# Patient Record
Sex: FEMALE | Race: BLACK OR AFRICAN AMERICAN | Employment: FULL TIME | URBAN - METROPOLITAN AREA
[De-identification: names, ages, dates, MRNs, and addresses within clinical notes are randomized per-mention and may not be internally consistent; named-entity substitution may affect disease eponyms.]

---

## 2017-02-22 ENCOUNTER — OFFICE VISIT (OUTPATIENT)
Dept: INTERNAL MEDICINE CLINIC | Age: 62
End: 2017-02-22

## 2017-02-22 VITALS
OXYGEN SATURATION: 97 % | HEART RATE: 99 BPM | SYSTOLIC BLOOD PRESSURE: 133 MMHG | HEIGHT: 58 IN | DIASTOLIC BLOOD PRESSURE: 76 MMHG | BODY MASS INDEX: 18.3 KG/M2 | TEMPERATURE: 98.1 F | WEIGHT: 87.2 LBS | RESPIRATION RATE: 18 BRPM

## 2017-02-22 DIAGNOSIS — J06.9 VIRAL URI WITH COUGH: Primary | ICD-10-CM

## 2017-02-22 RX ORDER — BENZONATATE 200 MG/1
200 CAPSULE ORAL
Qty: 21 CAP | Refills: 0 | Status: SHIPPED | OUTPATIENT
Start: 2017-02-22 | End: 2017-03-01

## 2017-02-22 NOTE — PATIENT INSTRUCTIONS
1. Mucinex to help break up congestion, tessalon capsules for cough  2. Please get your mammogram and see when you are supposed to get repeat colonoscopy  3. Return if worsening symptoms      Viral Respiratory Infection: Care Instructions  Your Care Instructions  Viruses are very small organisms. They grow in number after they enter your body. There are many types that cause different illnesses, such as colds and the mumps. The symptoms of a viral respiratory infection often start quickly. They include a fever, sore throat, and runny nose. You may also just not feel well. Or you may not want to eat much. Most viral respiratory infections are not serious. They usually get better with time and self-care. Antibiotics are not used to treat a viral infection. That's because antibiotics will not help cure a viral illness. In some cases, antiviral medicine can help your body fight a serious viral infection. Follow-up care is a key part of your treatment and safety. Be sure to make and go to all appointments, and call your doctor if you are having problems. It's also a good idea to know your test results and keep a list of the medicines you take. How can you care for yourself at home? · Rest as much as possible until you feel better. · Be safe with medicines. Take your medicine exactly as prescribed. Call your doctor if you think you are having a problem with your medicine. You will get more details on the specific medicine your doctor prescribes. · Take an over-the-counter pain medicine, such as acetaminophen (Tylenol), ibuprofen (Advil, Motrin), or naproxen (Aleve), as needed for pain and fever. Read and follow all instructions on the label. Do not give aspirin to anyone younger than 20. It has been linked to Reye syndrome, a serious illness. · Drink plenty of fluids, enough so that your urine is light yellow or clear like water.  Hot fluids, such as tea or soup, may help relieve congestion in your nose and throat. If you have kidney, heart, or liver disease and have to limit fluids, talk with your doctor before you increase the amount of fluids you drink. · Try to clear mucus from your lungs by breathing deeply and coughing. · Gargle with warm salt water once an hour. This can help reduce swelling and throat pain. Use 1 teaspoon of salt mixed in 1 cup of warm water. · Do not smoke or allow others to smoke around you. If you need help quitting, talk to your doctor about stop-smoking programs and medicines. These can increase your chances of quitting for good. To avoid spreading the virus  · Cough or sneeze into a tissue. Then throw the tissue away. · If you don't have a tissue, use your hand to cover your cough or sneeze. Then clean your hand. You can also cough into your sleeve. · Wash your hands often. Use soap and warm water. Wash for 15 to 20 seconds each time. · If you don't have soap and water near you, you can clean your hands with alcohol wipes or gel. When should you call for help? Call your doctor now or seek immediate medical care if:  · You have a new or higher fever. · Your fever lasts more than 48 hours. · You have trouble breathing. · You have a fever with a stiff neck or a severe headache. · You are sensitive to light. · You feel very sleepy or confused. Watch closely for changes in your health, and be sure to contact your doctor if:  · You do not get better as expected. Where can you learn more? Go to http://guerrero-callie.info/. Enter M468 in the search box to learn more about \"Viral Respiratory Infection: Care Instructions. \"  Current as of: June 30, 2016  Content Version: 11.1  © 6592-9804 [employer]. Care instructions adapted under license by [employer] (which disclaims liability or warranty for this information).  If you have questions about a medical condition or this instruction, always ask your healthcare professional. [employer] disclaims any warranty or liability for your use of this information.

## 2017-02-22 NOTE — MR AVS SNAPSHOT
Visit Information Date & Time Provider Department Dept. Phone Encounter #  
 2/22/2017  8:15 AM Meliza Cardozo, [address] [id number] Follow-up Instructions Return in about 6 months (around 8/22/2017), or if symptoms worsen or fail to improve. Upcoming Health Maintenance Date Due ZOSTER VACCINE AGE 60> 12/6/2016 BREAST CANCER SCRN MAMMOGRAM 1/30/2017 FOBT Q 1 YEAR AGE 50-75 3/10/2017 Pneumococcal 19-64 Highest Risk (2 of 3 - PCV13) 11/30/2017 DTaP/Tdap/Td series (2 - Td) 8/22/2026 Allergies as of 2/22/2017  Review Complete On: 2/22/2017 By: Nicolasa Delgado LPN Severity Noted Reaction Type Reactions Lisinopril Medium 07/05/2011    Cough Other reaction(s): Adverse reaction to substance Current Immunizations  Never Reviewed Name Date Influenza Vaccine 11/10/2015 Pneumococcal Polysaccharide (PPSV-23) 11/30/2016 11:17 AM  
 Td, Adsorbed PF 3/19/2015 Tdap 8/22/2016  9:08 AM  
  
 Not reviewed this visit You Were Diagnosed With   
  
 Codes Comments Viral URI with cough    -  Primary ICD-10-CM: J06.9, B97.89 ICD-9-CM: 465.9 Vitals BP  
  
  
  
  
  
 133/76 (BP 1 Location: Left arm, BP Patient Position: Sitting) Vitals History BMI and BSA Data Body Mass Index Body Surface Area  
 18.22 kg/m 2 1.27 m 2 Preferred Pharmacy Pharmacy Name Phone [id number] [address] [phone] Your Updated Medication List  
  
   
This list is accurate as of: 2/22/17  9:04 AM.  Always use your most recent med list. amLODIPine 10 mg tablet Commonly known as:  Haines Blade Take 1 Tab by mouth daily. atorvastatin 10 mg tablet Commonly known as:  LIPITOR Take 1 Tab by mouth nightly. benzonatate 200 mg capsule Commonly known as:  TESSALON  
 Take 1 Cap by mouth three (3) times daily as needed for Cough for up to 7 days. guaiFENesin  mg SR tablet Commonly known as:  Brandon & Brandon Take 1 Tab by mouth two (2) times a day. hydroCHLOROthiazide 25 mg tablet Commonly known as:  HYDRODIURIL Take 1 Tab by mouth daily. Prescriptions Sent to Pharmacy Refills  
 benzonatate (TESSALON) 200 mg capsule 0 Sig: Take 1 Cap by mouth three (3) times daily as needed for Cough for up to 7 days. Class: Normal  
 Pharmacy: [id number]- [address]  #: [phone] Route: Oral  
 guaiFENesin SR (MUCINEX) 600 mg SR tablet 0 Sig: Take 1 Tab by mouth two (2) times a day. Class: Normal  
 Pharmacy: [id number]- [address] Ph #: [phone] Route: Oral  
  
Follow-up Instructions Return in about 6 months (around 8/22/2017), or if symptoms worsen or fail to improve. To-Do List   
 03/20/2017 7:00 AM  
  Appointment with [facility] CT 2 at [facility] ([phone]) CONTRAST STUDY: 1. The patient should not eat solid food four hours before the appointment but should be encouraged to drink clear liquids. 2.  If you have to drink oral contrast, please pick it up any weekday prior to your appointment, if you cannot please check in 2 hrs before appt time. 3.  The patient will require IV access for contrast administration. 4.  The patient should not take Ibuprofen (Advil, Motrin, etc.) and Naproxen Sodium (Aleve, etc.)  on the day of the exam. Stopping non-steroidal anti-inflammatory agents (NSAIDs) like Ibuprofen decreases the risk of kidney damage from the x-ray contrast (dye). 5.  Bring any non [facility] facility films/images pertaining to the area of interest with you on the day of appointment.  6.  Bring current lab work if available (within last 90 days CMP) ***If scheduled at [facility], iSTAT is not available, labs will need to be done before appointment*** 7. Check in at registration at least 30 minutes before appt time unless you were instructed to do otherwise. Patient Instructions 1. Mucinex to help break up congestion, tessalon capsules for cough 2. Please get your mammogram and see when you are supposed to get repeat colonoscopy 3. Return if worsening symptoms Viral Respiratory Infection: Care Instructions Your Care Instructions Viruses are very small organisms. They grow in number after they enter your body. There are many types that cause different illnesses, such as colds and the mumps. The symptoms of a viral respiratory infection often start quickly. They include a fever, sore throat, and runny nose. You may also just not feel well. Or you may not want to eat much. Most viral respiratory infections are not serious. They usually get better with time and self-care. Antibiotics are not used to treat a viral infection. That's because antibiotics will not help cure a viral illness. In some cases, antiviral medicine can help your body fight a serious viral infection. Follow-up care is a key part of your treatment and safety. Be sure to make and go to all appointments, and call your doctor if you are having problems. It's also a good idea to know your test results and keep a list of the medicines you take. How can you care for yourself at home? · Rest as much as possible until you feel better. · Be safe with medicines. Take your medicine exactly as prescribed. Call your doctor if you think you are having a problem with your medicine. You will get more details on the specific medicine your doctor prescribes.  
· Take an over-the-counter pain medicine, such as acetaminophen (Tylenol), ibuprofen (Advil, Motrin), or naproxen (Aleve), as needed for pain and fever. Read and follow all instructions on the label. Do not give aspirin to anyone younger than 20. It has been linked to Reye syndrome, a serious illness. · Drink plenty of fluids, enough so that your urine is light yellow or clear like water. Hot fluids, such as tea or soup, may help relieve congestion in your nose and throat. If you have kidney, heart, or liver disease and have to limit fluids, talk with your doctor before you increase the amount of fluids you drink. · Try to clear mucus from your lungs by breathing deeply and coughing. · Gargle with warm salt water once an hour. This can help reduce swelling and throat pain. Use 1 teaspoon of salt mixed in 1 cup of warm water. · Do not smoke or allow others to smoke around you. If you need help quitting, talk to your doctor about stop-smoking programs and medicines. These can increase your chances of quitting for good. To avoid spreading the virus · Cough or sneeze into a tissue. Then throw the tissue away. · If you don't have a tissue, use your hand to cover your cough or sneeze. Then clean your hand. You can also cough into your sleeve. · Wash your hands often. Use soap and warm water. Wash for 15 to 20 seconds each time. · If you don't have soap and water near you, you can clean your hands with alcohol wipes or gel. When should you call for help? Call your doctor now or seek immediate medical care if: 
· You have a new or higher fever. · Your fever lasts more than 48 hours. · You have trouble breathing. · You have a fever with a stiff neck or a severe headache. · You are sensitive to light. · You feel very sleepy or confused. Watch closely for changes in your health, and be sure to contact your doctor if: 
· You do not get better as expected. Where can you learn more? Go to http://guerrero-callie.info/. Enter J058 in the search box to learn more about \"Viral Respiratory Infection: Care Instructions. \" 
 Current as of: June 30, 2016 Content Version: 11.1 © 9207-5796 [employer]. Care instructions adapted under license by [employer] (which disclaims liability or warranty for this information). If you have questions about a medical condition or this instruction, always ask your healthcare professional. [address] any warranty or liability for your use of this information. Introducing [facility]! Dear Shaye Thomas: Thank you for requesting a [employer] account. Our records indicate that you already have an active [employer] account. You can access your account anytime at [email or web address]. [employer]ack/[employer] Did you know that you can access your hospital and ER discharge instructions at any time in [employer]? You can also review all of your test results from your hospital stay or ER visit. Additional Information If you have questions, please visit the Frequently Asked Questions section of the [employer] website at [email or web address] Remember, [employer] is NOT to be used for urgent needs. For medical emergencies, dial 911. Now available from your iPhone and Android! Please provide this summary of care documentation to your next provider. Your primary care clinician is listed as Angelika Eason. If you have any questions after today's visit, please call [phone].

## 2017-02-22 NOTE — PROGRESS NOTES
Subjective: (As above and below)     Chief Complaint   Patient presents with   [facility] Hypertension    Follow-up   [facility] BRAIN Ram is a 62y.o. year old female who presents for follow up and following concerns:    Hypertension ROS:  taking medications as instructed, no medication side effects noted, no TIAs, no chest pain on exertion, no dyspnea on exertion, no swelling of ankles    Cold symptoms: cough with yellow phlegm, mild sob, no wheezing. Mild tightness in ribs with coughing. She denies fevers. She has been taking nyquil and dayquil which are not helping. 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 She did get a flu shot. No sore throat. No ear symptoms. She is followed by nephro for hx of renal cell carcinoma and has upcoming routine CT scan. She is planning on rescheduling her mammogram. She is asked to contact GI to see when they wanted to f/u on colonoscopy with hx of polyps. Reviewed PmHx, RxHx, FmHx, SocHx, AllgHx and updated in chart.   Family History   Problem Relation Age of Onset    Diabetes Mother     Alcohol abuse Father     Cancer Sister      breast    Cancer Brother      breast    No Known Problems Sister     No Known Problems Brother        Past Medical History:   Diagnosis Date    Chronic leg pain 11/3/2010    fibromyalgia    COPD (chronic obstructive pulmonary disease) ([facility] [address] 9/18/2009    GERD (gastroesophageal reflux disease)     History of kidney cancer     Hypertension 9/18/2009    Leukopenia 9/18/2009    Other ill-defined conditions(799.89)     fibromyalgia    Vitamin D deficiency 7/7/2010      Social History     Social History    Marital status: SINGLE     Spouse name: N/A    Number of children: N/A    Years of education: N/A     Social History Main Topics    Smoking status: Current Every Day Smoker     Packs/day: 0.50    Smokeless tobacco: Never Used    Alcohol use Yes      Comment: occasionally    Drug use: No    Sexual activity: Yes     Partners: Male     Birth control/ protection: Surgical     Other Topics Concern    None     Social History Narrative          Current Outpatient Prescriptions   Medication Sig    benzonatate (TESSALON) 200 mg capsule Take 1 Cap by mouth three (3) times daily as needed for Cough for up to 7 days.  guaiFENesin SR (MUCINEX) 600 mg SR tablet Take 1 Tab by mouth two (2) times a day.  atorvastatin (LIPITOR) 10 mg tablet Take 1 Tab by mouth nightly.  hydrochlorothiazide (HYDRODIURIL) 25 mg tablet Take 1 Tab by mouth daily.  amLODIPine (NORVASC) 10 mg tablet Take 1 Tab by mouth daily. No current facility-administered medications for this visit. Review of Systems:   Constitutional:    Negative for fever and chills, negative diaphoresis. HEENT:              Negative for neck pain and stiffness. Eyes:                  Negative for visual disturbance, itching, redness or discharge. Respiratory:        + for cough and shortness of breath. Cardiovascular:  Negative for chest pain and palpitations. Gastrointestinal: Negative for nausea, vomiting, abdominal pain, diarrhea or constipation. Genitourinary:     Negative for dysuria and frequency. Musculoskeletal: Negative for falls, tenderness and swelling. Skin:                    Negative for rash, masses or lesions. Neurological:       Negative for dizzyness, seizure, loss of consciousness, weakness and numbness.      Objective:     Vitals:    02/22/17 0843   BP: 133/76   Pulse: 99   Resp: 18   Temp: 98.1 °F (36.7 °C)   TempSrc: Oral   SpO2: 97%   Weight: 87 lb 3.2 oz (39.6 kg)   Height: 4' 10\" (1.473 m)       Results for orders placed or performed in visit on 11/30/16   TSH 3RD GENERATION   Result Value Ref Range    TSH 0.813 0.450 - 4.500 uIU/mL   VITAMIN B12 & FOLATE   Result Value Ref Range    Vitamin B12 627 211 - 946 pg/mL    Folate 17.4 >3.0 ng/mL   AMB POC URINALYSIS DIP STICK AUTO W/O MICRO   Result Value Ref Range    Color (UA POC) Colorless     Clarity (UA POC) Clear     Glucose (UA POC) Negative Negative    Bilirubin (UA POC) Negative Negative    Ketones (UA POC) Negative Negative    Specific gravity (UA POC) 1.010 1.001 - 1.035    Blood (UA POC) Trace Negative    pH (UA POC) 6.5 4.6 - 8.0    Protein (UA POC) Negative Negative mg/dL    Urobilinogen (UA POC) 0.2 mg/dL 0.2 - 1    Nitrites (UA POC) Negative Negative    Leukocyte esterase (UA POC) Negative Negative         Physical Examination: General appearance - alert, well appearing, and in no distress  Eyes - pupils equal and reactive, extraocular eye movements intact  Ears - bilateral TM's and external ear canals normal  Mouth - mucous membranes moist, pharynx normal without lesions. +clear PND noted  Neck - supple, no significant adenopathy  Chest - clear to auscultation, no wheezes, rales or rhonchi, symmetric air entry  Heart - normal rate, regular rhythm, normal S1, S2, no murmurs, rubs, clicks or gallops  Extremities - peripheral pulses normal, no pedal edema, no clubbing or cyanosis      Assessment/ Plan:   Follow-up Disposition:  Return in about 6 months (around 8/22/2017), or if symptoms worsen or fail to improve. 1. Viral URI with cough    - benzonatate (TESSALON) 200 mg capsule; Take 1 Cap by mouth three (3) times daily as needed for Cough for up to 7 days. Dispense: 21 Cap; Refill: 0  - guaiFENesin SR (MUCINEX) 600 mg SR tablet; Take 1 Tab by mouth two (2) times a day. Dispense: 30 Tab; Refill: 0        I have discussed the diagnosis with the patient and the intended plan as seen in the above orders. The patient has received an after-visit summary and questions were answered concerning future plans. Pt conveyed understanding of plan. Medication Side Effects and Warnings were discussed with patient: yes  Patient Labs were reviewed: yes  Patient Past Records were reviewed:  yes    Mary Amos.  Jorge A Appiah NP

## 2017-02-22 NOTE — PROGRESS NOTES
1. Have you been to the ER, urgent care clinic since your last visit? Hospitalized since your last visit? No.    2. Have you seen or consulted any other health care providers outside of the [address] [place] [address] since your last visit? Include any pap smears or colon screening.  No.

## 2017-03-20 ENCOUNTER — HOSPITAL ENCOUNTER (OUTPATIENT)
Dept: GENERAL RADIOLOGY | Age: 62
Discharge: HOME | End: 2017-03-20
Attending: UROLOGY
Payer: COMMERCIAL

## 2017-03-20 ENCOUNTER — HOSPITAL ENCOUNTER (OUTPATIENT)
Dept: CT IMAGING | Age: 62
Discharge: HOME | End: 2017-03-20
Attending: UROLOGY
Payer: COMMERCIAL

## 2017-03-20 DIAGNOSIS — C64.9 RENAL CELL CARCINOMA (HCC): ICD-10-CM

## 2017-03-20 DIAGNOSIS — C64.9 CARCINOMA, RENAL CELL (HCC): ICD-10-CM

## 2017-03-20 LAB — CREAT BLD-MCNC: 0.7 MG/DL (ref 0.6–1.3)

## 2017-03-20 PROCEDURE — 82565 ASSAY OF CREATININE: CPT

## 2017-03-20 PROCEDURE — 71020 XR CHEST PA LAT: CPT

## 2017-03-20 PROCEDURE — 74160 CT ABDOMEN W/CONTRAST: CPT

## 2017-03-20 PROCEDURE — 74011636320 HC RX REV CODE- 636/320: Performed by: UROLOGY

## 2017-03-20 PROCEDURE — 74011250636 HC RX REV CODE- 250/636: Performed by: UROLOGY

## 2017-03-20 RX ORDER — SODIUM CHLORIDE 9 MG/ML
50 INJECTION, SOLUTION INTRAVENOUS
Status: COMPLETED | OUTPATIENT
Start: 2017-03-20 | End: 2017-03-20

## 2017-03-20 RX ORDER — SODIUM CHLORIDE 0.9 % (FLUSH) 0.9 %
10 SYRINGE (ML) INJECTION
Status: COMPLETED | OUTPATIENT
Start: 2017-03-20 | End: 2017-03-20

## 2017-03-20 RX ADMIN — IOPAMIDOL 100 ML: 612 INJECTION, SOLUTION INTRAVENOUS at 07:06

## 2017-03-20 RX ADMIN — Medication 10 ML: at 07:06

## 2017-03-20 RX ADMIN — SODIUM CHLORIDE 50 ML/HR: 900 INJECTION, SOLUTION INTRAVENOUS at 07:06

## 2017-04-25 ENCOUNTER — OFFICE VISIT (OUTPATIENT)
Dept: PRIMARY CARE CLINIC | Age: 62
End: 2017-04-25

## 2017-04-25 VITALS
RESPIRATION RATE: 16 BRPM | TEMPERATURE: 98 F | WEIGHT: 91 LBS | BODY MASS INDEX: 19.1 KG/M2 | HEIGHT: 58 IN | HEART RATE: 77 BPM | SYSTOLIC BLOOD PRESSURE: 129 MMHG | OXYGEN SATURATION: 97 % | DIASTOLIC BLOOD PRESSURE: 64 MMHG

## 2017-04-25 DIAGNOSIS — H61.23 BILATERAL IMPACTED CERUMEN: Primary | ICD-10-CM

## 2017-04-25 NOTE — PROGRESS NOTES
Pt c/o bilateral ear pain. Pt states that L ear has wax discharge and R ear feels like \"there's something in there\". Pt states sx's x1wk. Pt states R ear has throbbing discomfort. Pt states having h/o wax build up.

## 2017-04-25 NOTE — PATIENT INSTRUCTIONS
Earwax Blockage: Care Instructions  Your Care Instructions    Earwax is a natural substance that protects the ear canal. Normally, earwax drains from the ears and does not cause problems. Sometimes earwax builds up and hardens. Earwax blockage (also called cerumen impaction) can cause some loss of hearing and pain. When wax is tightly packed, you will need to have your doctor remove it. Follow-up care is a key part of your treatment and safety. Be sure to make and go to all appointments, and call your doctor if you are having problems. Its also a good idea to know your test results and keep a list of the medicines you take. How can you care for yourself at home? · Do not try to remove earwax with cotton swabs, fingers, or other objects. This can make the blockage worse and damage the eardrum. · If your doctor recommends that you try to remove earwax at home:  ¨ Soften and loosen the earwax with warm mineral oil. You also can try hydrogen peroxide mixed with an equal amount of room temperature water. Place 2 drops of the fluid, warmed to body temperature, in the ear two times a day for up to 5 days. ¨ Once the wax is loose and soft, all that is usually needed to remove it from the ear canal is a gentle, warm shower. Direct the water into the ear, then tip your head to let the earwax drain out. Dry your ear thoroughly with a hair dryer set on low. Hold the dryer several inches from your ear. ¨ If the warm mineral oil and shower do not work, use an over-the-counter wax softener followed by gentle flushing with an ear syringe each night for a week or two. Make sure the flushing solution is body temperature. Cool or hot fluids in the ear can cause dizziness. When should you call for help? Call your doctor now or seek immediate medical care if:  · Pus or blood drains from your ear. · Your ears are ringing or feel full. · You have a loss of hearing.   Watch closely for changes in your health, and be sure to contact your doctor if:  · You have pain or reduced hearing after 1 week of home treatment. · You have any new symptoms, such as nausea or balance problems. Where can you learn more? Go to http://guerrero-callie.info/. Enter Q340 in the search box to learn more about \"Earwax Blockage: Care Instructions. \"  Current as of: May 27, 2016  Content Version: 11.2  © 0268-6574 [employer]. Care instructions adapted under license by [employer] (which disclaims liability or warranty for this information). If you have questions about a medical condition or this instruction, always ask your healthcare professional. [address] any warranty or liability for your use of this information.

## 2017-04-25 NOTE — PROGRESS NOTES
Subjective:      Vandana Varela is a 62 y.o. female who presents for possible ear infection. She has noticed bilateral ear discomfort for a week. She has tried earwax drops which helped slightly on the left side but still feels pressure. No fever or chills. Has hx of wax build up in her ears. No recent URI symptoms. Past Medical History:   Diagnosis Date    Chronic leg pain 11/3/2010    fibromyalgia    COPD (chronic obstructive pulmonary disease) ([address] 9/18/2009    GERD (gastroesophageal reflux disease)     History of kidney cancer     Hypertension 9/18/2009    Leukopenia 9/18/2009    Other ill-defined conditions     fibromyalgia    Vitamin D deficiency 7/7/2010     Current Outpatient Prescriptions   Medication Sig Dispense Refill    atorvastatin (LIPITOR) 10 mg tablet Take 1 Tab by mouth nightly. 30 Tab 3    hydrochlorothiazide (HYDRODIURIL) 25 mg tablet Take 1 Tab by mouth daily. 90 Tab 3    amLODIPine (NORVASC) 10 mg tablet Take 1 Tab by mouth daily. 90 Tab 3    guaiFENesin SR (MUCINEX) 600 mg SR tablet Take 1 Tab by mouth two (2) times a day. 30 Tab 0     Allergies   Allergen Reactions    Lisinopril Cough     Other reaction(s): Adverse reaction to substance        Review of Systems  Pertinent items are noted in HPI. Objective:     Visit Vitals    /64 (BP 1 Location: Right arm, BP Patient Position: Sitting)    Pulse 77    Temp 98 °F (36.7 °C) (Oral)    Resp 16    Ht 4' 10\" (1.473 m)    Wt 91 lb (41.3 kg)    SpO2 97%    BMI 19.02 kg/m2     GEN: No apparent distress. Alert and oriented and responds to all questions appropriately. EYES:  Conjunctiva clear; pupils round and reactive to light; extraocular movements are intact. EAR: External ears are normal. Bilateral cerumen impaction, irrigated as below, normal TMs visualized   NOSE: Turbinates are within normal limits. No drainage  OROPHYARYNX: No oral lesions or exudates.   NECK:  Supple; no masses; thyroid normal LUNGS: Respirations unlabored; clear to auscultation bilaterally  CARDIOVASCULAR: Regular, rate, and rhythm without murmurs, gallops or rubs   EXT: Well perfused. No edema. Moving all extremities equally. SKIN: No obvious rashes. Assessment:       ICD-10-CM ICD-9-CM    1. Bilateral impacted cerumen H61.23 380.4 NE REMOVAL IMPACTED CERUMEN IRRIGATION/LVG UNILAT     Irrigation done with water and hydrogen peroxide, all cerumen was removed, patient reported immediate relief. No signs of AOM. Return PRN.

## 2017-04-25 NOTE — MR AVS SNAPSHOT
Visit Information Date & Time Provider Department Dept. Phone Encounter #  
 4/25/2017  5:30  Alejandraanne , [address] [facility] [phone] [id number] Upcoming Health Maintenance Date Due ZOSTER VACCINE AGE 60> 12/6/2016 BREAST CANCER SCRN MAMMOGRAM 1/30/2017 Pneumococcal 19-64 Highest Risk (2 of 3 - PCV13) 11/30/2017 COLONOSCOPY 10/5/2021 DTaP/Tdap/Td series (2 - Td) 8/22/2026 Allergies as of 4/25/2017  Review Complete On: 4/25/2017 By: Kayla Jackson MD  
  
 Severity Noted Reaction Type Reactions Lisinopril Medium 07/05/2011    Cough Other reaction(s): Adverse reaction to substance Current Immunizations  Never Reviewed Name Date Influenza Vaccine 11/10/2015 Pneumococcal Polysaccharide (PPSV-23) 11/30/2016 11:17 AM  
 Td, Adsorbed PF 3/19/2015 Tdap 8/22/2016  9:08 AM  
  
 Not reviewed this visit You Were Diagnosed With   
  
 Codes Comments Bilateral impacted cerumen    -  Primary ICD-10-CM: Q72.42 
ICD-9-CM: 380.4 Vitals BP Pulse Temp Resp Height(growth percentile) Weight(growth percentile) 129/64 (BP 1 Location: Right arm, BP Patient Position: Sitting) 77 98 °F (36.7 °C) (Oral) 16 4' 10\" (1.473 m) 91 lb (41.3 kg) SpO2 BMI OB Status Smoking Status 97% 19.02 kg/m2 Hysterectomy Current Every Day Smoker Vitals History BMI and BSA Data Body Mass Index Body Surface Area 19.02 kg/m 2 1.3 m 2 Preferred Pharmacy Pharmacy Name Phone [id number] [address] [phone] Your Updated Medication List  
  
   
This list is accurate as of: 4/25/17  5:50 PM.  Always use your most recent med list. amLODIPine 10 mg tablet Commonly known as:  Chantell Jame Take 1 Tab by mouth daily. atorvastatin 10 mg tablet Commonly known as:  LIPITOR Take 1 Tab by mouth nightly. guaiFENesin  mg SR tablet Commonly known as:  Brandon & Brandon Take 1 Tab by mouth two (2) times a day. hydroCHLOROthiazide 25 mg tablet Commonly known as:  HYDRODIURIL Take 1 Tab by mouth daily. We Performed the Following AR REMOVAL IMPACTED CERUMEN IRRIGATION/LVG MANAV [37710 CPT(R)] Patient Instructions Earwax Blockage: Care Instructions Your Care Instructions Earwax is a natural substance that protects the ear canal. Normally, earwax drains from the ears and does not cause problems. Sometimes earwax builds up and hardens. Earwax blockage (also called cerumen impaction) can cause some loss of hearing and pain. When wax is tightly packed, you will need to have your doctor remove it. Follow-up care is a key part of your treatment and safety. Be sure to make and go to all appointments, and call your doctor if you are having problems. Its also a good idea to know your test results and keep a list of the medicines you take. How can you care for yourself at home? · Do not try to remove earwax with cotton swabs, fingers, or other objects. This can make the blockage worse and damage the eardrum. · If your doctor recommends that you try to remove earwax at home: ¨ Soften and loosen the earwax with warm mineral oil. You also can try hydrogen peroxide mixed with an equal amount of room temperature water. Place 2 drops of the fluid, warmed to body temperature, in the ear two times a day for up to 5 days. ¨ Once the wax is loose and soft, all that is usually needed to remove it from the ear canal is a gentle, warm shower. Direct the water into the ear, then tip your head to let the earwax drain out. Dry your ear thoroughly with a hair dryer set on low. Hold the dryer several inches from your ear.  
¨ If the warm mineral oil and shower do not work, use an over-the-counter wax softener followed by gentle flushing with an ear syringe each night for a week or two. Make sure the flushing solution is body temperature. Cool or hot fluids in the ear can cause dizziness. When should you call for help? Call your doctor now or seek immediate medical care if: · Pus or blood drains from your ear. · Your ears are ringing or feel full. · You have a loss of hearing. Watch closely for changes in your health, and be sure to contact your doctor if: 
· You have pain or reduced hearing after 1 week of home treatment. · You have any new symptoms, such as nausea or balance problems. Where can you learn more? Go to http://guerrero-callie.info/. Enter U862 in the search box to learn more about \"Earwax Blockage: Care Instructions. \" Current as of: May 27, 2016 Content Version: 11.2 © 5369-5738 [employer]. Care instructions adapted under license by [employer] (which disclaims liability or warranty for this information). If you have questions about a medical condition or this instruction, always ask your healthcare professional. [place] [address] any warranty or liability for your use of this information. Introducing [facility]! Dear Gilma Gallagher: Thank you for requesting a [employer] account. Our records indicate that you already have an active [employer] account. You can access your account anytime at [email or web address] Did you know that you can access your hospital and ER discharge instructions at any time in [employer]? You can also review all of your test results from your hospital stay or ER visit. Additional Information If you have questions, please visit the Frequently Asked Questions section of the [employer] website at [email or web address] Remember, [employer] is NOT to be used for urgent needs. For medical emergencies, dial 911. Now available from your iPhone and Android! Please provide this summary of care documentation to your next provider. Your primary care clinician is listed as Pedro Eason. If you have any questions after today's visit, please call [phone].

## 2017-04-26 ENCOUNTER — TELEPHONE (OUTPATIENT)
Dept: INTERNAL MEDICINE CLINIC | Age: 62
End: 2017-04-26

## 2017-04-27 DIAGNOSIS — Z12.39 SCREENING FOR BREAST CANCER: Primary | ICD-10-CM

## 2017-07-17 ENCOUNTER — ANESTHESIA (OUTPATIENT)
Dept: ENDOSCOPY | Age: 62
End: 2017-07-17
Payer: COMMERCIAL

## 2017-07-17 ENCOUNTER — ANESTHESIA EVENT (OUTPATIENT)
Dept: ENDOSCOPY | Age: 62
End: 2017-07-17
Payer: COMMERCIAL

## 2017-07-17 ENCOUNTER — HOSPITAL ENCOUNTER (OUTPATIENT)
Age: 62
Setting detail: OUTPATIENT SURGERY
Discharge: HOME | End: 2017-07-17
Attending: INTERNAL MEDICINE | Admitting: INTERNAL MEDICINE
Payer: COMMERCIAL

## 2017-07-17 VITALS
DIASTOLIC BLOOD PRESSURE: 81 MMHG | HEART RATE: 86 BPM | WEIGHT: 84.31 LBS | HEIGHT: 59 IN | OXYGEN SATURATION: 100 % | SYSTOLIC BLOOD PRESSURE: 151 MMHG | TEMPERATURE: 97.6 F | RESPIRATION RATE: 20 BRPM | BODY MASS INDEX: 17 KG/M2

## 2017-07-17 LAB
H PYLORI FROM TISSUE: NEGATIVE
KIT LOT NO., HCLOLOT: NORMAL
NEGATIVE CONTROL: NEGATIVE
POSITIVE CONTROL: POSITIVE

## 2017-07-17 PROCEDURE — 74011250636 HC RX REV CODE- 250/636: Performed by: INTERNAL MEDICINE

## 2017-07-17 PROCEDURE — 88305 TISSUE EXAM BY PATHOLOGIST: CPT | Performed by: INTERNAL MEDICINE

## 2017-07-17 PROCEDURE — 87077 CULTURE AEROBIC IDENTIFY: CPT | Performed by: INTERNAL MEDICINE

## 2017-07-17 PROCEDURE — 74011250636 HC RX REV CODE- 250/636

## 2017-07-17 PROCEDURE — 88112 CYTOPATH CELL ENHANCE TECH: CPT | Performed by: INTERNAL MEDICINE

## 2017-07-17 PROCEDURE — 88312 SPECIAL STAINS GROUP 1: CPT | Performed by: INTERNAL MEDICINE

## 2017-07-17 PROCEDURE — 76040000019: Performed by: INTERNAL MEDICINE

## 2017-07-17 PROCEDURE — 77030019988 HC FCPS ENDOSC DISP BSC -B: Performed by: INTERNAL MEDICINE

## 2017-07-17 PROCEDURE — 76060000031 HC ANESTHESIA FIRST 0.5 HR: Performed by: INTERNAL MEDICINE

## 2017-07-17 PROCEDURE — 74011000250 HC RX REV CODE- 250

## 2017-07-17 RX ORDER — PROPOFOL 10 MG/ML
INJECTION, EMULSION INTRAVENOUS AS NEEDED
Status: DISCONTINUED | OUTPATIENT
Start: 2017-07-17 | End: 2017-07-17 | Stop reason: HOSPADM

## 2017-07-17 RX ORDER — DEXTROMETHORPHAN/PSEUDOEPHED 2.5-7.5/.8
1.2 DROPS ORAL
Status: DISCONTINUED | OUTPATIENT
Start: 2017-07-17 | End: 2017-07-17 | Stop reason: HOSPADM

## 2017-07-17 RX ORDER — DIPHENHYDRAMINE HYDROCHLORIDE 50 MG/ML
50 INJECTION, SOLUTION INTRAMUSCULAR; INTRAVENOUS ONCE
Status: DISCONTINUED | OUTPATIENT
Start: 2017-07-17 | End: 2017-07-17 | Stop reason: HOSPADM

## 2017-07-17 RX ORDER — LORAZEPAM 2 MG/ML
2 INJECTION INTRAMUSCULAR AS NEEDED
Status: DISCONTINUED | OUTPATIENT
Start: 2017-07-17 | End: 2017-07-17 | Stop reason: HOSPADM

## 2017-07-17 RX ORDER — GLYCOPYRROLATE 0.2 MG/ML
INJECTION INTRAMUSCULAR; INTRAVENOUS AS NEEDED
Status: DISCONTINUED | OUTPATIENT
Start: 2017-07-17 | End: 2017-07-17 | Stop reason: HOSPADM

## 2017-07-17 RX ORDER — MIDAZOLAM HYDROCHLORIDE 1 MG/ML
1-2 INJECTION, SOLUTION INTRAMUSCULAR; INTRAVENOUS
Status: DISCONTINUED | OUTPATIENT
Start: 2017-07-17 | End: 2017-07-17 | Stop reason: HOSPADM

## 2017-07-17 RX ORDER — EPINEPHRINE 0.1 MG/ML
1 INJECTION INTRACARDIAC; INTRAVENOUS
Status: DISCONTINUED | OUTPATIENT
Start: 2017-07-17 | End: 2017-07-17 | Stop reason: HOSPADM

## 2017-07-17 RX ORDER — SODIUM CHLORIDE 0.9 % (FLUSH) 0.9 %
5-10 SYRINGE (ML) INJECTION EVERY 8 HOURS
Status: DISCONTINUED | OUTPATIENT
Start: 2017-07-17 | End: 2017-07-17 | Stop reason: HOSPADM

## 2017-07-17 RX ORDER — MIDAZOLAM HYDROCHLORIDE 1 MG/ML
5 INJECTION, SOLUTION INTRAMUSCULAR; INTRAVENOUS
Status: DISCONTINUED | OUTPATIENT
Start: 2017-07-17 | End: 2017-07-17 | Stop reason: HOSPADM

## 2017-07-17 RX ORDER — DEXTROSE MONOHYDRATE AND SODIUM CHLORIDE 5; .9 G/100ML; G/100ML
100 INJECTION, SOLUTION INTRAVENOUS CONTINUOUS
Status: DISCONTINUED | OUTPATIENT
Start: 2017-07-17 | End: 2017-07-17 | Stop reason: HOSPADM

## 2017-07-17 RX ORDER — SODIUM CHLORIDE 9 MG/ML
100 INJECTION, SOLUTION INTRAVENOUS CONTINUOUS
Status: DISCONTINUED | OUTPATIENT
Start: 2017-07-17 | End: 2017-07-17 | Stop reason: HOSPADM

## 2017-07-17 RX ORDER — LIDOCAINE HYDROCHLORIDE 20 MG/ML
5 SOLUTION OROPHARYNGEAL AS NEEDED
Status: DISCONTINUED | OUTPATIENT
Start: 2017-07-17 | End: 2017-07-17 | Stop reason: HOSPADM

## 2017-07-17 RX ORDER — ATROPINE SULFATE 0.1 MG/ML
0.5 INJECTION INTRAVENOUS
Status: DISCONTINUED | OUTPATIENT
Start: 2017-07-17 | End: 2017-07-17 | Stop reason: HOSPADM

## 2017-07-17 RX ORDER — FENTANYL CITRATE 50 UG/ML
100 INJECTION, SOLUTION INTRAMUSCULAR; INTRAVENOUS ONCE
Status: DISCONTINUED | OUTPATIENT
Start: 2017-07-17 | End: 2017-07-17 | Stop reason: HOSPADM

## 2017-07-17 RX ORDER — NALOXONE HYDROCHLORIDE 0.4 MG/ML
0.4 INJECTION, SOLUTION INTRAMUSCULAR; INTRAVENOUS; SUBCUTANEOUS
Status: DISCONTINUED | OUTPATIENT
Start: 2017-07-17 | End: 2017-07-17 | Stop reason: HOSPADM

## 2017-07-17 RX ORDER — SODIUM CHLORIDE 0.9 % (FLUSH) 0.9 %
5-10 SYRINGE (ML) INJECTION AS NEEDED
Status: DISCONTINUED | OUTPATIENT
Start: 2017-07-17 | End: 2017-07-17 | Stop reason: HOSPADM

## 2017-07-17 RX ORDER — FLUMAZENIL 0.1 MG/ML
0.2 INJECTION INTRAVENOUS
Status: DISCONTINUED | OUTPATIENT
Start: 2017-07-17 | End: 2017-07-17 | Stop reason: HOSPADM

## 2017-07-17 RX ORDER — LIDOCAINE HYDROCHLORIDE 20 MG/ML
INJECTION, SOLUTION EPIDURAL; INFILTRATION; INTRACAUDAL; PERINEURAL AS NEEDED
Status: DISCONTINUED | OUTPATIENT
Start: 2017-07-17 | End: 2017-07-17 | Stop reason: HOSPADM

## 2017-07-17 RX ADMIN — SODIUM CHLORIDE 100 ML/HR: 900 INJECTION, SOLUTION INTRAVENOUS at 08:32

## 2017-07-17 RX ADMIN — GLYCOPYRROLATE 0.2 MG: 0.2 INJECTION INTRAMUSCULAR; INTRAVENOUS at 09:18

## 2017-07-17 RX ADMIN — PROPOFOL 120 MG: 10 INJECTION, EMULSION INTRAVENOUS at 09:29

## 2017-07-17 RX ADMIN — LIDOCAINE HYDROCHLORIDE 40 MG: 20 INJECTION, SOLUTION EPIDURAL; INFILTRATION; INTRACAUDAL; PERINEURAL at 09:18

## 2017-07-17 NOTE — DISCHARGE INSTRUCTIONS
Endoscopy Discharge Instructions     Dr. Effie Sharma office   [place] [id number] [phone]                                        NAME: Sumit Sanders RECORD ENGUTM:[id number]   PATIENT SS#[id number] YOB: 1955   SEX:  female AGE:  62 y.o. FINAL Discharge Procedure and Diagnosis:       Procedure(s) with comments:  ESOPHAGOGASTRODUODENOSCOPY (EGD)  ESOPHAGOGASTRODUODENAL (EGD) BIOPSY - biopsy  ENDOSCOPIC BRUSHING - esophageal brushing       FINDINGS:     Duodenal and gastric inflammation mild   Biopsy pending                                            MEDICATIONS    [x] CONTINUE CURRENT MEDICATIONS     [] NEW MEDICATIONS           1.    2.    3.                      Testing   Schedule                    New additional  Tests  Call the office   [phone]) for the appointment time      []      []      []                [address] [facility]: in  2 week(s). KEEP APPOINTMENT WITH  Meliza Berger NP                                                                                        If you had a colonoscopy the \"C\" indicates specific instructions        x  Ordinarily you may resume your previous diet but your initial diet should be   light. Large meals can cause abdominal discomfort after these procedures. Specific Diet Recommendations:             [x] High fiber diet.,         [x] GERD diet: avoid fried and fatty foods, peppermint, chocolate, alcohol,               coffee, citrus fruits and juices, and tomato products. Avoid lying down for 2           to 3  hours after eating.                     __x__  You may feel quite tired and need to rest and recuperate for several hours    following these procedures. __x__  Due to the fact that sedation was administered for this procedure, do not drive,   operate machinery or sign legal documents for the next 24 hours. __x__  Mild abdominal pain may be experienced after your procedure, but is should   disappear after several hours. Notify your physician if you have persistent pain,   tenderness or abdominal distension. __x__  C    Many patients for the first few hours following the exam may experience        belching or passing gas through the rectum. Walking may help to relieve        distention and gas pains. A warm bath or shower will often help with abdominal  cramping.                                                                                            __x__   Carolannqiana Barnett may return to your normal routine tomorrow, according to how you feel        and depending on your doctors instructions. Be sure to call your doctor to make  an appointment for a post-surgery check-up on the date your doctor has   requested. __x__ C     Rectal bleeding or spotting in small amounts may occur with the first bowel   movement following a colonoscopy or sigmoidoscopy. __x__  Carolannqiana Barnett may experience a numbness or lack of sensation in throat. If present, do not     eat or drink. Before eating, test your ability to drink with small sips of water. Y     You may try clear liquids or soups. If you tolerate these, you may then eat solid     food which is not greasy or spicy. __x__ C     IF POLYPS REMOVED: Avoid any blood thinning medication such as plavix,   aspirin or coumadin  NSAIDS (like advil or alleve) for 7 days. __x__  Notify your physician if you cough or vomit blood or experience chest pain.            Your biopsy or testing result should be available in 7-10 days                                                                                                                        Prescription will be electronically sent to your pharmacy you must     let your nurse know your pharmacy:                                                                                                                                            [id number] [place] [address]. TO HELP ENSURE A SMOOTH RECOVERY,       IT IS IMPORTANT TO FOLLOW THEM. _x___Pamphlet /Educational Information provided for diagnostic findings     Additional education information can assessed at the sites below:   [place]   http://www.digestive. niddk.nih.gov/ddiseases/a-z.asp      Web MD patient information                                                                                                Signature of individual giving instruction :   Date: 2017                                                                                                                                Zahroof Valveshart Activation    Thank you for requesting access to [facility]. Please follow the instructions below to securely access and download your online medical record. [facility] allows you to send messages to your doctor, view your test results, renew your prescriptions, schedule appointments, and more. How Do I Sign Up? 1. In your internet browser, go to [email or web address]  2. Click on the First Time User? Click Here link in the Sign In box. You will be redirect to the New Member Sign Up page. 3. Enter your [facility] Access Code exactly as it appears below. You will not need to use this code after youve completed the sign-up process. If you do not sign up before the expiration date, you must request a new code. [facility] Access Code: Activation code not generated  Current [facility] Status: Active (This is the date your [facility] access code will )    4.  Enter the last four digits of your Social Security Number (xxxx) and Date of Birth (mm/dd/yyyy) as indicated and click Submit. You will be taken to the next sign-up page. 5. Create a Tribzit ID. This will be your [employer] login ID and cannot be changed, so think of one that is secure and easy to remember. 6. Create a [employer] password. You can change your password at any time. 7. Enter your Password Reset Question and Answer. This can be used at a later time if you forget your password. 8. Enter your e-mail address. You will receive e-mail notification when new information is available in [address]. 9. Click Sign Up. You can now view and download portions of your medical record. 10. Click the Download Summary menu link to download a portable copy of your medical information. Additional Information    If you have questions, please visit the Frequently Asked Questions section of the [employer] website at [email or web address] Remember, [employer] is NOT to be used for urgent needs. For medical emergencies, dial 911.

## 2017-07-17 NOTE — H&P
G I Procedure Note           Endoscopy History and Physical               Dr. Dionisio Rodriguez     [facility]  [id number] [address] [facility] [id number]  [id number]    1955  62 y.o.  female      Date of Procedure:   Preoperative Diagnosis:       Procedure:    7/17/2017           GERD   DYSPHAGIA                               Procedure(s):  ESOPHAGOGASTRODUODENOSCOPY (EGD)      Gastroenterologist:  Anesthesia:           Deni Sethi MD                               MAC            History and procedure indication:  Benito Alejandra is a 62 y.o. BLACK OR  female who presents with: GERD   DYSPHAGIA    including the additional history of Dysphagia and Heartburn ,,,        Past Medical History:   Diagnosis Date    Chronic leg pain 11/3/2010    fibromyalgia    COPD (chronic obstructive pulmonary disease) [address] [address] 9/18/2009    GERD (gastroesophageal reflux disease)     History of kidney cancer     Hypertension 9/18/2009    Leukopenia 9/18/2009    Other ill-defined conditions     fibromyalgia    Vitamin D deficiency 7/7/2010      Prior to Admission medications    Medication Sig Start Date End Date Taking? Authorizing Provider   guaiFENesin SR (MUCINEX) 600 mg SR tablet Take 1 Tab by mouth two (2) times a day. 2/22/17   Meliza Hodge NP   atorvastatin (LIPITOR) 10 mg tablet Take 1 Tab by mouth nightly. 8/26/16   Meliza Hodge NP   hydrochlorothiazide (HYDRODIURIL) 25 mg tablet Take 1 Tab by mouth daily. 3/10/16   Renee Casarez MD   amLODIPine (NORVASC) 10 mg tablet Take 1 Tab by mouth daily. 3/10/16   Renee Casarez MD     Allergies   Allergen Reactions    Lisinopril Cough     Other reaction(s):  Adverse reaction to substance        Past Surgical History:   Procedure Laterality Date    HX GYN      hysterectomy     Family History   Problem Relation Age of Onset    Diabetes Mother     Alcohol abuse Father     Cancer Sister      breast    Cancer Brother      breast    No Known Problems Sister     No Known Problems Brother       Social History   Substance Use Topics    Smoking status: Current Every Day Smoker     Packs/day: 0.50    Smokeless tobacco: Never Used    Alcohol use Yes      Comment: occasionally                                                      PHYSICAL EXAM   There were no vitals taken for this visit. General appearance:  alert, well appearing, and in no distress  Mental status:  normal mood, behavior, speech, dress, motor activity and thought processes  Nose:      normal and patent, no erythema, discharge or polyps  Mouth:- mucous membranes moist, pharynx normal without lesions                  [x]  No Loose teeth      []    Loose teeth  Finger opening:  []1     []1.5    [] 2     [] 2.5     [x] 3      [] 3.5     [] 4   Mallampati:         [] Class 1     [x] Class 2    [] Class 3      [] Class 4      Neck - supple,      [x] Full ROM [] Decreased ROM  [] Short Neck no significant adenopathy    Chest - clear to auscultation, no wheezes, rales or rhonchi, symmetric air entry  Heart: normal rate, regular rhythm, normal S1, S2, no murmurs, rubs, clicks or gallops  Abdomen: abdomen soft, bowel sounds  [x] normal  [] increased  [] hypoactive                       [] no tenderness  [] epigastric tenderness  [] LLQ tenderness   [] RLQ tenderness                      No masses, organomegaly or guarding. Rectal exam: negative without mass, lesions or tenderness  Extremities: peripheral pulses normal, no pedal edema, no clubbing or cyanosis  Neurologic: Alert and oriented to person, place, and time; normal strength and tone.                          Normal symmetric reflexes  Normal gait:                                      Assessement:                                 Pre op dx:  GERD   DYSPHAGIA    Additional medical problems list below   Patient Active Problem List   Diagnosis Code    Hypertension I10    COPD (chronic obstructive pulmonary disease) (HCC) J44.9    Leukopenia D72.819    Vitamin D deficiency E55.9    Chronic leg pain M79.606, G89.29    Fibromyalgia, primary M79.7    Left renal mass N28.89    Regular astigmatism H52.229    Nuclear cataract H25.10                                                                                         This note documentation was performed prior to this planned procedure       after a history and physical was performed in the office. Date: 6 27 17                   Pre Procedure Evaluation (per anesthesia or per h&p)                                                Sedation/Assessment:                                                                                               Mallampati Classification                            []Class 1                    []Class 2                    [] Class 3                  [] Class 4                                              ASA classfication         []     Class I: Normally healthy         []     Class II: Patient with mild systemic disease (e.g. hypertension)         []     Class III: Patient with severe systemic disease (e.g. CHF), non-decompensated         []     Class IV: Patient with severe systemic disease, decompensated         []     Class V: Moribund patient, survival unlikely                     Plan:  [x]  Egd                                 [] Colonoscopy                               [] with Moderate Sedation /Conscious Sedation                                 [x] MAC          Patient stable for planned procedure. See orders.      Enedelia Santamaria MD

## 2017-07-17 NOTE — PROGRESS NOTES
Pt denies urge to void - reports having been on bedpan post-procedure. oob to w/c - steady with assistance & tolerated well.

## 2017-07-17 NOTE — IP AVS SNAPSHOT
[address] [facility] 
[phone] Patient: Benito Alejandra MRN: [id number] :1955 You are allergic to the following Allergen Reactions Lisinopril Cough Other reaction(s): Adverse reaction to substance Recent Documentation Height Weight Breastfeeding? BMI OB Status Smoking Status 1.499 m 38.2 kg No 17.03 kg/m2 Hysterectomy Current Every Day Smoker Emergency Contacts Name Discharge Info Relation Home Work [employer] Sidra Dean DISCHARGE CAREGIVER [3] Sister [23] [phone] [address] CAREGIVER [3] Sister [23] [phone]  Other Relative [6] [phone] About your hospitalization You were admitted on:  2017 You last received care in the:  [facility] ENDOSCOPY You were discharged on:  2017 Unit phone number:  [phone] Why you were hospitalized Your primary diagnosis was:  Not on File Providers Seen During Your Hospitalizations Provider Role Specialty Primary office phone Deni Sethi MD Attending Provider Internal Medicine [phone] Your Primary Care Physician (PCP) Primary Care Physician Office Phone Office Fax [facility] [phone] Follow-up Information Follow up With Details Comments Contact Info Meliza Kwan [place], [address] [place] [address] [phone] 
[phone] Current Discharge Medication List  
  
CONTINUE these medications which have NOT CHANGED Dose & Instructions Dispensing Information Comments Morning Noon Evening Bedtime  
 amLODIPine 10 mg tablet Commonly known as:  Олег Phillips Your last dose was: Your next dose is:    
   
   
 Dose:  10 mg Take 1 Tab by mouth daily. Quantity:  90 Tab Refills:  3 atorvastatin 10 mg tablet Commonly known as:  LIPITOR Your last dose was: Your next dose is:    
   
   
 Dose:  10 mg Take 1 Tab by mouth nightly. Quantity:  30 Tab Refills:  3  
     
   
   
   
  
 guaiFENesin  mg SR tablet Commonly known as:  Brandon & Brandon Your last dose was: Your next dose is:    
   
   
 Dose:  600 mg Take 1 Tab by mouth two (2) times a day. Quantity:  30 Tab Refills:  0  
     
   
   
   
  
 hydroCHLOROthiazide 25 mg tablet Commonly known as:  HYDRODIURIL Your last dose was: Your next dose is:    
   
   
 Dose:  25 mg Take 1 Tab by mouth daily. Quantity:  90 Tab Refills:  3 Discharge Instructions Endoscopy Discharge Instructions Dr. Milagros Hilario [place] office [phone] [address] Office    [phone] NAME: Chica HERBERT [id number] PATIENT SS#[id number] YOB: 1955 SEX:  female AGE:  62 y.o. FINAL Discharge Procedure and Diagnosis:   
  
Procedure(s) with comments: 
ESOPHAGOGASTRODUODENOSCOPY (EGD) ESOPHAGOGASTRODUODENAL (EGD) BIOPSY - biopsy ENDOSCOPIC BRUSHING - esophageal brushing FINDINGS:    
Duodenal and gastric inflammation mild Biopsy pending MEDICATIONS CONTINUE CURRENT MEDICATIONS 
 
  NEW MEDICATIONS 1.  
 2.  
 3.  
 
 
 
  
  
 
  
 
Testing Schedule New additional 
Tests Call the office  
[phone]) for the appointment time YOUR NEXT APPOINTMENT WITH DR Sherren Schlatter: in  2 week(s). KEEP APPOINTMENT WITH  Meliza Gotti NP  
                 
 
 
                     
                                     
 If you had a colonoscopy the \"C\" indicates specific instructions    
  
x  Ordinarily you may resume your previous diet but your initial diet should be   light. Large meals can cause abdominal discomfort after these procedures. Specific Diet Recommendations:  
    
      High fiber diet.,  
 
     GERD diet: avoid fried and fatty foods, peppermint, chocolate, alcohol,    
          coffee, citrus fruits and juices, and tomato products. Avoid lying down for 2           to 3  hours after eating.       
            
__x__  You may feel quite tired and need to rest and recuperate for several hours    following these procedures. __x__  Due to the fact that sedation was administered for this procedure, do not drive,   operate machinery or sign legal documents for the next 24 hours. __x__  Mild abdominal pain may be experienced after your procedure, but is should   disappear after several hours. Notify your physician if you have persistent pain,   tenderness or abdominal distension. __x__  C Many patients for the first few hours following the exam may experience        belching or passing gas through the rectum. Walking may help to relieve      
 distention and gas pains. A warm bath or shower will often help with abdominal  cramping.                                                                                        
  
__x__   Riesa Body may return to your normal routine tomorrow, according to how you feel        and depending on your doctors instructions. Be sure to call your doctor to make  an appointment for a post-surgery check-up on the date your doctor has   requested. __x__ C Rectal bleeding or spotting in small amounts may occur with the first bowel   movement following a colonoscopy or sigmoidoscopy. __x__  Alma Holstein may experience a numbness or lack of sensation in throat. If present, do not   
 eat or drink. Before eating, test your ability to drink with small sips of water. Y     You may try clear liquids or soups. If you tolerate these, you may then eat solid     food which is not greasy or spicy. __x__ C   
 IF POLYPS REMOVED: Avoid any blood thinning medication such as plavix,   aspirin or coumadin  NSAIDS (like advil or alleve) for 7 days. __x__  Notify your physician if you cough or vomit blood or experience chest pain. Your biopsy or testing result should be available in 7-10 days Prescription will be electronically sent to your pharmacy you must  
  let your nurse know your pharmacy:  
                                                                                                                               
 
 
     [address] TO HELP ENSURE A SMOOTH RECOVERY,  
    IT IS IMPORTANT TO FOLLOW THEM. _x___Pamphlet /Educational Information provided for diagnostic findings Additional education information can assessed at the sites below: 
 [place] 
 http://www.digestive. niddk.nih.gov/ddiseases/a-z.asp Web MD patient information Signature of individual giving instruction : 
 Date: 7/17/2017 mimoOn Activation Thank you for requesting access to mimoOn. Please follow the instructions below to securely access and download your online medical record.  mimoOn allows you to send messages to your doctor, view your test results, renew your prescriptions, schedule appointments, and more. How Do I Sign Up? 1. In your internet browser, go to [email or web address] 
2. Click on the First Time User? Click Here link in the Sign In box. You will be redirect to the New Member Sign Up page. 3. Enter your Viewpoints Access Code exactly as it appears below. You will not need to use this code after youve completed the sign-up process. If you do not sign up before the expiration date, you must request a new code. Viewpoints Access Code: Activation code not generated Current Viewpoints Status: Active (This is the date your Viewpoints access code will ) 4. Enter the last four digits of your Social Security Number (xxxx) and Date of Birth (mm/dd/yyyy) as indicated and click Submit. You will be taken to the next sign-up page. 5. Create a Viewpoints ID. This will be your Viewpoints login ID and cannot be changed, so think of one that is secure and easy to remember. 6. Create a Viewpoints password. You can change your password at any time. 7. Enter your Password Reset Question and Answer. This can be used at a later time if you forget your password. 8. Enter your e-mail address. You will receive e-mail notification when new information is available in [address] 9. Click Sign Up. You can now view and download portions of your medical record. 10. Click the Download Summary menu link to download a portable copy of your medical information. Additional Information If you have questions, please visit the Frequently Asked Questions section of the Viewpoints website at [email or web address] Remember, Viewpoints is NOT to be used for urgent needs. For medical emergencies, dial 911. Discharge Orders None Introducing [facility]! Deadarin Romo: Thank you for requesting a Viewpoints account.   Our records indicate that you already have an active Kickstarter account. You can access your account anytime at [email or web address] Did you know that you can access your hospital and ER discharge instructions at any time in Kickstarter? You can also review all of your test results from your hospital stay or ER visit. Additional Information If you have questions, please visit the Frequently Asked Questions section of the Kickstarter website at [email or web address] Remember, Kickstarter is NOT to be used for urgent needs. For medical emergencies, dial 911. Now available from your iPhone and Android! General Information Please provide this summary of care documentation to your next provider. Patient Signature:  ____________________________________________________________ Date:  ____________________________________________________________  
  
Juan Pablo Cart Provider Signature:  ____________________________________________________________ Date:  ____________________________________________________________

## 2017-07-17 NOTE — PROGRESS NOTES
Sophia Alexandre  1955  [id number]    Situation:  Verbal report received from: Cosme Sunshine RN  Procedure: Procedure(s) with comments:  ESOPHAGOGASTRODUODENOSCOPY (EGD)  ESOPHAGOGASTRODUODENAL (EGD) BIOPSY - biopsy  ENDOSCOPIC BRUSHING - esophageal brushing    Background:    Preoperative diagnosis: GERD   DYSPHAGIA   Postoperative diagnosis: Gastritis, no esophageal obstruction, possible candida    :  Dr. Katie Carmona  Assistant(s): Endoscopy Technician-1: Nani Rodriguez [place]  Endoscopy RN-1: Jessica Andrew RN    Specimens:   ID Type Source Tests Collected by Time Destination   1 : Path - Duodenal nodules bx Preservative Duodenum  Alondra Sánchez MD 7/17/2017 0932 Pathology   1 : Esophageal brushing Fresh Esophageal Brushing  Alondra Sánchez MD 7/17/2017 0933 Cytology     H. Pylori  no    Assessment:  Intra-procedure medications :  Propofol 120 mg      Anesthesia gave intra-procedure sedation and medications, see anesthesia flow sheet yes    Intravenous fluids: NS@ KVO     Vital signs stable     Abdominal assessment: round and soft     Recommendation:  Discharge patient per MD order. Family here.   Permission to share finding with family or friend yes

## 2017-07-17 NOTE — ANESTHESIA PREPROCEDURE EVALUATION
Anesthetic History   No history of anesthetic complications            Review of Systems / Medical History  Patient summary reviewed, nursing notes reviewed and pertinent labs reviewed    Pulmonary    COPD: mild      Smoker         Neuro/Psych   Within defined limits           Cardiovascular    Hypertension: well controlled          Hyperlipidemia    Exercise tolerance: >4 METS  Comments: 9-2015 EKG:  NSR   GI/Hepatic/Renal     GERD: well controlled    Renal disease (Left renal mass--ca,   s/p surgery)      Comments: GERD, Dysphagia Endo/Other        Arthritis and cancer (left nephrectomy)     Other Findings   Comments: Vit D defic  Fibromyalgia  Chronic leg pain         Physical Exam    Airway  Mallampati: II  TM Distance: > 6 cm  Neck ROM: normal range of motion   Mouth opening: Normal     Cardiovascular  Regular rate and rhythm,  S1 and S2 normal,  no murmur, click, rub, or gallop             Dental  No notable dental hx       Pulmonary  Breath sounds clear to auscultation               Abdominal  GI exam deferred       Other Findings            Anesthetic Plan    ASA: 2  Anesthesia type: total IV anesthesia          Induction: Intravenous  Anesthetic plan and risks discussed with: Patient

## 2017-07-17 NOTE — ANESTHESIA POSTPROCEDURE EVALUATION
Post-Anesthesia Evaluation and Assessment    Patient: Gonzalez Dejesus MRN: [id number]  SSN: [id number]    YOB: 1955  Age: 62 y.o. Sex: female       Cardiovascular Function/Vital Signs  Visit Vitals    /81    Pulse 86    Temp 36.4 °C (97.6 °F)    Resp 20    Ht 4' 11\" (1.499 m)    Wt 38.2 kg (84 lb 5 oz)    SpO2 100%    Breastfeeding No    BMI 17.03 kg/m2       Patient is status post total IV anesthesia anesthesia for Procedure(s):  ESOPHAGOGASTRODUODENOSCOPY (EGD)  ESOPHAGOGASTRODUODENAL (EGD) BIOPSY  ENDOSCOPIC BRUSHING. Nausea/Vomiting: None    Postoperative hydration reviewed and adequate. Pain:  Pain Scale 1: Numeric (0 - 10) (07/17/17 1008)  Pain Intensity 1: 0 (07/17/17 1008)   Managed    Neurological Status: At baseline    Mental Status and Level of Consciousness: Arousable    Pulmonary Status:   O2 Device: Room air (07/17/17 1008)   Adequate oxygenation and airway patent    Complications related to anesthesia: None    Post-anesthesia assessment completed.  No concerns    Signed By: Santa Loredo DO     July 17, 2017

## 2017-07-17 NOTE — PROCEDURES
G I Procedure Note                         EGD    Dr. Sushant Benson office   [facility] [place] [address]                              [id number]                                 [id number]   1955                       62 y.o.                female        Procedure Date: 7/17/2017   Procedure  EGD  with biopsy. Pre Op Diagnosis:                     1. GERD   DYSPHAGIA                                                                                                                                                                           Post Op Diagnosis:                    1.  No obstruction mild gerd                                                        2.  Duodenal nodules most likely brunner gland hypertrophy vs hamartoma biopsy pending    3. Mild gastritis             H&p completed  Yes    Anesthesia Assessment Performed prior to procedure:No change   Medications Medication Record            Description of Procedure:  Jose Majano  was seen in the endoscopy suite and the pre procedure evaluation was completed. The patient was identified as Jose Majano  and the procedure verified as EGD with biopsy. A Time Out was held and the above information confirmed. The risks, benefits, complications, treatment options and expected outcomes were discussed with the patient. The possibilities of reaction to medication, pulmonary aspiration, perforation of a viscus, bleeding, failure to diagnose a condition and creating a complication requiring transfusion or operation were discussed with the patient who  Permit obtained and risks explained ( [id number])     Procedure Note:  With the patient in the left lateral position and after appropriate conscious anesthesia the Olympus Video endoscope was passed under direct vision into the oropharynx.   The oropharynx appeared Normal   The instrument was advanced into the esophagus and the findings were a normal appearing esophageal mucosa without other anatomic abnormalities. The instrument was advanced into the stomach through the esophagogastric junction. The gastroscope was advanced progressively through the stomach visualizing the body and antral areas. The findings were a moderate gastritis with erosions. A biopsy was obtained. The instrument was further advanced through the pylorus into the duodenum. The bulb of the duodenum and the second portion of the duodenum were examined and the findings were a nodular  appearing duodenal mucosa with mild erythema. A biopsy was obtained   The endoscope was withdrawn back into the stomach and retroflexed with examination of the fundus and cardia and the findings were no additional abnormalities . The instrument was withdrawn back into the esophagus and the the finding were no additional abnormalities    Biopsies were obtained for helicobacter testing and pathologic analysis from the representative areas. The endoscope was completely withdrawn and the patient tolerated the procedure well. 1.  Blood loss was nominal.  2.  For biopsy  Specimen verification by physician and nurse two sources name,           social security numbers     Suggestions  Plan 1. - Acid suppression with a proton pump inhibitor.  - Await pathology. - Await PATRICIA test result and treat for Helicobacter pylori if positive. Jesús Sorenson NP

## 2017-11-02 ENCOUNTER — APPOINTMENT (OUTPATIENT)
Dept: GENERAL RADIOLOGY | Age: 62
End: 2017-11-02
Attending: EMERGENCY MEDICINE
Payer: COMMERCIAL

## 2017-11-02 ENCOUNTER — HOSPITAL ENCOUNTER (EMERGENCY)
Age: 62
Discharge: HOME | End: 2017-11-02
Attending: EMERGENCY MEDICINE
Payer: COMMERCIAL

## 2017-11-02 ENCOUNTER — APPOINTMENT (OUTPATIENT)
Dept: CT IMAGING | Age: 62
End: 2017-11-02
Attending: EMERGENCY MEDICINE
Payer: COMMERCIAL

## 2017-11-02 VITALS
OXYGEN SATURATION: 100 % | HEART RATE: 99 BPM | RESPIRATION RATE: 18 BRPM | SYSTOLIC BLOOD PRESSURE: 171 MMHG | TEMPERATURE: 97.6 F | WEIGHT: 88.4 LBS | BODY MASS INDEX: 17.82 KG/M2 | HEIGHT: 59 IN | DIASTOLIC BLOOD PRESSURE: 81 MMHG

## 2017-11-02 DIAGNOSIS — N20.0 KIDNEY STONE: Primary | ICD-10-CM

## 2017-11-02 DIAGNOSIS — R19.7 DIARRHEA, UNSPECIFIED TYPE: ICD-10-CM

## 2017-11-02 LAB
ALBUMIN SERPL-MCNC: 4 G/DL (ref 3.5–5)
ALBUMIN/GLOB SERPL: 1 {RATIO} (ref 1.1–2.2)
ALP SERPL-CCNC: 63 U/L (ref 45–117)
ALT SERPL-CCNC: 22 U/L (ref 12–78)
ANION GAP SERPL CALC-SCNC: 4 MMOL/L (ref 5–15)
APPEARANCE UR: CLEAR
AST SERPL-CCNC: 19 U/L (ref 15–37)
BACTERIA URNS QL MICRO: NEGATIVE /HPF
BASOPHILS # BLD: 0 K/UL (ref 0–0.1)
BASOPHILS NFR BLD: 0 % (ref 0–1)
BILIRUB SERPL-MCNC: 0.3 MG/DL (ref 0.2–1)
BILIRUB UR QL: NEGATIVE
BUN SERPL-MCNC: 21 MG/DL (ref 6–20)
BUN/CREAT SERPL: 21 (ref 12–20)
CALCIUM SERPL-MCNC: 9 MG/DL (ref 8.5–10.1)
CHLORIDE SERPL-SCNC: 106 MMOL/L (ref 97–108)
CO2 SERPL-SCNC: 30 MMOL/L (ref 21–32)
COLOR UR: ABNORMAL
CREAT SERPL-MCNC: 0.99 MG/DL (ref 0.55–1.02)
EOSINOPHIL # BLD: 0 K/UL (ref 0–0.4)
EOSINOPHIL NFR BLD: 0 % (ref 0–7)
EPITH CASTS URNS QL MICRO: NORMAL /LPF
ERYTHROCYTE [DISTWIDTH] IN BLOOD BY AUTOMATED COUNT: 14.9 % (ref 11.5–14.5)
GLOBULIN SER CALC-MCNC: 3.9 G/DL (ref 2–4)
GLUCOSE SERPL-MCNC: 85 MG/DL (ref 65–100)
GLUCOSE UR STRIP.AUTO-MCNC: NEGATIVE MG/DL
HCT VFR BLD AUTO: 41.5 % (ref 35–47)
HGB BLD-MCNC: 14.5 G/DL (ref 11.5–16)
HGB UR QL STRIP: ABNORMAL
INR PPP: 1 (ref 0.9–1.1)
KETONES UR QL STRIP.AUTO: NEGATIVE MG/DL
LACTATE SERPL-SCNC: 1 MMOL/L (ref 0.4–2)
LEUKOCYTE ESTERASE UR QL STRIP.AUTO: NEGATIVE
LIPASE SERPL-CCNC: 224 U/L (ref 73–393)
LYMPHOCYTES # BLD: 1.2 K/UL (ref 0.8–3.5)
LYMPHOCYTES NFR BLD: 13 % (ref 12–49)
MCH RBC QN AUTO: 26.5 PG (ref 26–34)
MCHC RBC AUTO-ENTMCNC: 34.9 G/DL (ref 30–36.5)
MCV RBC AUTO: 75.9 FL (ref 80–99)
MONOCYTES # BLD: 0.6 K/UL (ref 0–1)
MONOCYTES NFR BLD: 6 % (ref 5–13)
NEUTS SEG # BLD: 7.3 K/UL (ref 1.8–8)
NEUTS SEG NFR BLD: 81 % (ref 32–75)
NITRITE UR QL STRIP.AUTO: NEGATIVE
PH UR STRIP: 7 [PH] (ref 5–8)
PLATELET # BLD AUTO: 271 K/UL (ref 150–400)
POTASSIUM SERPL-SCNC: 3.7 MMOL/L (ref 3.5–5.1)
PROT SERPL-MCNC: 7.9 G/DL (ref 6.4–8.2)
PROT UR STRIP-MCNC: NEGATIVE MG/DL
PROTHROMBIN TIME: 9.8 SEC (ref 9–11.1)
RBC # BLD AUTO: 5.47 M/UL (ref 3.8–5.2)
RBC #/AREA URNS HPF: NORMAL /HPF (ref 0–5)
SODIUM SERPL-SCNC: 140 MMOL/L (ref 136–145)
SP GR UR REFRACTOMETRY: <1.005 (ref 1–1.03)
UROBILINOGEN UR QL STRIP.AUTO: 0.2 EU/DL (ref 0.2–1)
WBC # BLD AUTO: 9.1 K/UL (ref 3.6–11)
WBC URNS QL MICRO: NORMAL /HPF (ref 0–4)

## 2017-11-02 PROCEDURE — 99284 EMERGENCY DEPT VISIT MOD MDM: CPT

## 2017-11-02 PROCEDURE — 71020 XR CHEST PA LAT: CPT

## 2017-11-02 PROCEDURE — 36415 COLL VENOUS BLD VENIPUNCTURE: CPT | Performed by: EMERGENCY MEDICINE

## 2017-11-02 PROCEDURE — 96375 TX/PRO/DX INJ NEW DRUG ADDON: CPT

## 2017-11-02 PROCEDURE — 74011636320 HC RX REV CODE- 636/320: Performed by: EMERGENCY MEDICINE

## 2017-11-02 PROCEDURE — 85025 COMPLETE CBC W/AUTO DIFF WBC: CPT | Performed by: EMERGENCY MEDICINE

## 2017-11-02 PROCEDURE — 96374 THER/PROPH/DIAG INJ IV PUSH: CPT

## 2017-11-02 PROCEDURE — 83690 ASSAY OF LIPASE: CPT | Performed by: EMERGENCY MEDICINE

## 2017-11-02 PROCEDURE — 80053 COMPREHEN METABOLIC PANEL: CPT | Performed by: EMERGENCY MEDICINE

## 2017-11-02 PROCEDURE — 74011250636 HC RX REV CODE- 250/636: Performed by: EMERGENCY MEDICINE

## 2017-11-02 PROCEDURE — 71010 XR CHEST PORT: CPT

## 2017-11-02 PROCEDURE — 96361 HYDRATE IV INFUSION ADD-ON: CPT

## 2017-11-02 PROCEDURE — 85610 PROTHROMBIN TIME: CPT | Performed by: EMERGENCY MEDICINE

## 2017-11-02 PROCEDURE — 83605 ASSAY OF LACTIC ACID: CPT | Performed by: EMERGENCY MEDICINE

## 2017-11-02 PROCEDURE — 81001 URINALYSIS AUTO W/SCOPE: CPT | Performed by: EMERGENCY MEDICINE

## 2017-11-02 PROCEDURE — 74177 CT ABD & PELVIS W/CONTRAST: CPT

## 2017-11-02 RX ORDER — ONDANSETRON 2 MG/ML
4 INJECTION INTRAMUSCULAR; INTRAVENOUS
Status: COMPLETED | OUTPATIENT
Start: 2017-11-02 | End: 2017-11-02

## 2017-11-02 RX ORDER — LOPERAMIDE HYDROCHLORIDE 2 MG/1
2 CAPSULE ORAL
Qty: 10 CAP | Refills: 0 | Status: SHIPPED | OUTPATIENT
Start: 2017-11-02 | End: 2018-07-12

## 2017-11-02 RX ORDER — TRAMADOL HYDROCHLORIDE 50 MG/1
50 TABLET ORAL
Qty: 20 TAB | Refills: 0 | Status: SHIPPED | OUTPATIENT
Start: 2017-11-02 | End: 2017-11-12

## 2017-11-02 RX ORDER — KETOROLAC TROMETHAMINE 30 MG/ML
15 INJECTION, SOLUTION INTRAMUSCULAR; INTRAVENOUS
Status: COMPLETED | OUTPATIENT
Start: 2017-11-02 | End: 2017-11-02

## 2017-11-02 RX ORDER — SODIUM CHLORIDE 0.9 % (FLUSH) 0.9 %
10 SYRINGE (ML) INJECTION
Status: COMPLETED | OUTPATIENT
Start: 2017-11-02 | End: 2017-11-02

## 2017-11-02 RX ORDER — ONDANSETRON 4 MG/1
4 TABLET, ORALLY DISINTEGRATING ORAL
Qty: 10 TAB | Refills: 0 | Status: SHIPPED | OUTPATIENT
Start: 2017-11-02 | End: 2018-07-12

## 2017-11-02 RX ORDER — SODIUM CHLORIDE 9 MG/ML
50 INJECTION, SOLUTION INTRAVENOUS
Status: COMPLETED | OUTPATIENT
Start: 2017-11-02 | End: 2017-11-02

## 2017-11-02 RX ADMIN — DIATRIZOATE MEGLUMINE AND DIATRIZOATE SODIUM 30 ML: 660; 100 LIQUID ORAL; RECTAL at 10:17

## 2017-11-02 RX ADMIN — SODIUM CHLORIDE 500 ML: 900 INJECTION, SOLUTION INTRAVENOUS at 10:17

## 2017-11-02 RX ADMIN — SODIUM CHLORIDE 50 ML/HR: 900 INJECTION, SOLUTION INTRAVENOUS at 11:33

## 2017-11-02 RX ADMIN — KETOROLAC TROMETHAMINE 15 MG: 30 INJECTION, SOLUTION INTRAMUSCULAR at 10:47

## 2017-11-02 RX ADMIN — ONDANSETRON HYDROCHLORIDE 4 MG: 2 INJECTION, SOLUTION INTRAMUSCULAR; INTRAVENOUS at 10:17

## 2017-11-02 RX ADMIN — IOPAMIDOL 100 ML: 755 INJECTION, SOLUTION INTRAVENOUS at 11:33

## 2017-11-02 RX ADMIN — Medication 10 ML: at 11:33

## 2017-11-02 NOTE — ED NOTES
Pt given discharge information. All questions and concerns answered. No additional needs at this time. Pt ambulatory out.

## 2017-11-02 NOTE — ED PROVIDER NOTES
[facility] [address]  EMERGENCY DEPARTMENT HISTORY AND PHYSICAL EXAM       Date of Service: 11/2/2017   Patient Name: Alexey Ragland   YOB: 1955  Medical Record Number: [id number]    History of Presenting Illness     Chief Complaint   Patient presents with    Flank Pain     left sided flank pain with diarrhea x four hours        History Provided By:  patient    Additional History:   Alexey Ragland is a 62 y.o. female with PMhx significant for COPD, GERD, HTN, fibromyalgia, and lefy kidney cancer who presents ambulatory to the ED with cc of 10/10 left flank pain and diarrhea since ~0500 today. Pt states she has had ~5 episodes of diarrhea today. She states her pain improves \"when she presses on it. \" She denies trauma, injury, or heavy lifting to which her symptoms might be attributed. She denies hx of diverticulitis. Pt notes family hx of HTN. She denies taking any medications for her pain. Pt specifically denies HA, nausea, vomiting, dysuria, CP, SOB, fever, chills, and ABD pain. She states she will be driving herself home. Social Hx: + Tobacco, + EtOH, - Illicit Drugs    There are no other complaints, changes or physical findings at this time. Primary Care Provider: Jessie Brody.  Kasey Holden NP     Past History     Past Medical History:   Past Medical History:   Diagnosis Date    Chronic leg pain 11/3/2010    fibromyalgia    COPD (chronic obstructive pulmonary disease) ([facility] [address] 9/18/2009    GERD (gastroesophageal reflux disease)     History of kidney cancer     Hypertension 9/18/2009    Leukopenia 9/18/2009    Other ill-defined conditions     fibromyalgia    Vitamin D deficiency 7/7/2010        Past Surgical History:   Past Surgical History:   Procedure Laterality Date    HX GYN      hysterectomy        Family History:   Family History   Problem Relation Age of Onset    Diabetes Mother     Alcohol abuse Father     Cancer Sister      breast    Cancer Brother breast    No Known Problems Sister     No Known Problems Brother         Social History:   Social History   Substance Use Topics    Smoking status: Current Every Day Smoker     Packs/day: 0.50    Smokeless tobacco: Never Used    Alcohol use Yes      Comment: occasionally        Allergies: Allergies   Allergen Reactions    Lisinopril Cough     Other reaction(s): Adverse reaction to substance          Review of Systems   Review of Systems   Constitutional: Negative for chills and fever. HENT: Negative for congestion. Eyes: Negative for visual disturbance. Respiratory: Negative for shortness of breath. Cardiovascular: Negative for chest pain. Gastrointestinal: Positive for diarrhea. Negative for abdominal pain, anal bleeding, nausea and vomiting. Endocrine: Negative for heat intolerance. Genitourinary: Positive for flank pain (left). Musculoskeletal: Negative for back pain. Skin: Negative for rash. Allergic/Immunologic: Negative for immunocompromised state. Neurological: Negative for headaches. Hematological: Does not bruise/bleed easily. Psychiatric/Behavioral: Negative. All other systems reviewed and are negative. Physical Exam  Physical Exam   Constitutional: She is oriented to person, place, and time. She appears well-developed and well-nourished. No distress. HENT:   Head: Normocephalic and atraumatic. Eyes: EOM are normal.   Neck: Normal range of motion. Neck supple. Cardiovascular: Normal rate, regular rhythm and normal heart sounds. Pulmonary/Chest: Effort normal and breath sounds normal. No respiratory distress. Abdominal: Soft. Bowel sounds are normal. She exhibits no mass. There is tenderness in the left upper quadrant. Left flank non-tender   Musculoskeletal: Normal range of motion. She exhibits no edema. Neurological: She is alert and oriented to person, place, and time. Coordination normal.   Skin: Skin is warm and dry.    Psychiatric: She has a normal mood and affect. Nursing note and vitals reviewed. Medical Decision Making   I am the first provider for this patient. I reviewed the vital signs, available nursing notes, past medical history, past surgical history, family history and social history. Old Medical Records: Old medical records. Nursing notes. Provider Notes:     DDx: diverticulitis, kidney stone, colitis, dehydration, electrolyte abnormality, UTI, pancreatitis    ED Course:  9:30 AM  Initial assessment performed. The patients presenting problems have been discussed, and they are in agreement with the care plan formulated and outlined with them. I have encouraged them to ask questions as they arise throughout their visit. Progress Notes:     Tobacco Counseling  Discussed the risks of smoking and the benefits of smoking cessation as well as the long term sequelae of smoking with the patient. The patient verbalized their understanding. 11:15 AM  Pt re-evaluated, states her pain improved to 2/10 with Toradol. Diagnostic Study Results     Labs -      Recent Results (from the past 12 hour(s))   CBC WITH AUTOMATED DIFF    Collection Time: 11/02/17 10:16 AM   Result Value Ref Range    WBC 9.1 3.6 - 11.0 K/uL    RBC 5.47 (H) 3.80 - 5.20 M/uL    HGB 14.5 11.5 - 16.0 g/dL    HCT 41.5 35.0 - 47.0 %    MCV 75.9 (L) 80.0 - 99.0 FL    MCH 26.5 26.0 - 34.0 PG    MCHC 34.9 30.0 - 36.5 g/dL    RDW 14.9 (H) 11.5 - 14.5 %    PLATELET 564 826 - 268 K/uL    NEUTROPHILS 81 (H) 32 - 75 %    LYMPHOCYTES 13 12 - 49 %    MONOCYTES 6 5 - 13 %    EOSINOPHILS 0 0 - 7 %    BASOPHILS 0 0 - 1 %    ABS. NEUTROPHILS 7.3 1.8 - 8.0 K/UL    ABS. LYMPHOCYTES 1.2 0.8 - 3.5 K/UL    ABS. MONOCYTES 0.6 0.0 - 1.0 K/UL    ABS. EOSINOPHILS 0.0 0.0 - 0.4 K/UL    ABS.  BASOPHILS 0.0 0.0 - 0.1 K/UL   PROTHROMBIN TIME + INR    Collection Time: 11/02/17 10:16 AM   Result Value Ref Range    INR 1.0 0.9 - 1.1      Prothrombin time 9.8 9.0 - 06.4 sec   METABOLIC PANEL, COMPREHENSIVE    Collection Time: 11/02/17 10:16 AM   Result Value Ref Range    Sodium 140 136 - 145 mmol/L    Potassium 3.7 3.5 - 5.1 mmol/L    Chloride 106 97 - 108 mmol/L    CO2 30 21 - 32 mmol/L    Anion gap 4 (L) 5 - 15 mmol/L    Glucose 85 65 - 100 mg/dL    BUN 21 (H) 6 - 20 MG/DL    Creatinine 0.99 0.55 - 1.02 MG/DL    BUN/Creatinine ratio 21 (H) 12 - 20      GFR est AA >60 >60 ml/min/1.73m2    GFR est non-AA 57 (L) >60 ml/min/1.73m2    Calcium 9.0 8.5 - 10.1 MG/DL    Bilirubin, total 0.3 0.2 - 1.0 MG/DL    ALT (SGPT) 22 12 - 78 U/L    AST (SGOT) 19 15 - 37 U/L    Alk. phosphatase 63 45 - 117 U/L    Protein, total 7.9 6.4 - 8.2 g/dL    Albumin 4.0 3.5 - 5.0 g/dL    Globulin 3.9 2.0 - 4.0 g/dL    A-G Ratio 1.0 (L) 1.1 - 2.2     LIPASE    Collection Time: 11/02/17 10:16 AM   Result Value Ref Range    Lipase 224 73 - 393 U/L   LACTIC ACID    Collection Time: 11/02/17 10:16 AM   Result Value Ref Range    Lactic acid 1.0 0.4 - 2.0 MMOL/L   URINALYSIS W/ RFLX MICROSCOPIC    Collection Time: 11/02/17 10:49 AM   Result Value Ref Range    Color STRAW      Appearance CLEAR CLEAR      Specific gravity <1.005 1.003 - 1.030    pH (UA) 7.0 5.0 - 8.0      Protein NEGATIVE  NEG mg/dL    Glucose NEGATIVE  NEG mg/dL    Ketone NEGATIVE  NEG mg/dL    Bilirubin NEGATIVE  NEG      Blood SMALL (A) NEG      Urobilinogen 0.2 0.2 - 1.0 EU/dL    Nitrites NEGATIVE  NEG      Leukocyte Esterase NEGATIVE  NEG     URINE MICROSCOPIC ONLY    Collection Time: 11/02/17 10:49 AM   Result Value Ref Range    WBC 0-4 0 - 4 /hpf    RBC 5-10 0 - 5 /hpf    Epithelial cells FEW FEW /lpf    Bacteria NEGATIVE  NEG /hpf       Radiologic Studies -  The following have been ordered and reviewed:  CT Results  (Last 48 hours)               11/02/17 1136  CT ABD PELV W CONT Final result    Impression:  IMPRESSION:    1. No acute findings in the abdomen or pelvis.    2. Postsurgical changes in the lower pole of the left kidney, with an unchanged appearance since previous studies. 3. Tiny calculus in the region of the right ureterovesical junction or in the   urinary bladder, apparently nonobstructing. Narrative:  EXAM: CT ABDOMEN PELVIS WITH CONTRAST   INDICATION:  Abdominal pain, diarrhea, left-sided flank pain and diarrhea for 4   hours. COMPARISON: CT of 3/20/2017. 3/21/2016   CONTRAST: 100 mL of Isovue-370. TECHNIQUE:    Multislice helical CT was performed from the diaphragm to the symphysis pubis   during uneventful rapid bolus intravenous contrast administration. Oral contrast   was not administered. Contiguous 5 mm axial images were reconstructed and lung   and soft tissue windows were generated. Coronal and sagittal reformations were   generated. CT dose reduction was achieved through use of a standardized protocol   tailored for this examination and automatic exposure control for dose   modulation. FINDINGS:   LOWER CHEST: The visualized portions of the lung bases are clear. There is no   abnormal density in the right lung base or in the right cardiophrenic sulcus. ABDOMEN:   Liver: Subcentimeter hyperenhancing focus in the superior right lobe of the   liver, unchanged since previous studies and likely representing incidental focal   nodular hyperplasia. No significant focal lesions. No hepatomegaly. Gallbladder and bile ducts: No gallstones. No biliary dilatation. Spleen: No abnormality. Pancreas: No abnormality. Adrenal glands: No abnormality. Kidneys: Postsurgical changes in the lower pole of the left kidney, unchanged in   appearance since the previous study. Unchanged mild prominence of the left renal   pelvis. No hydronephrosis. Normal appearance of the right kidney. No right   hydroureteronephrosis. PELVIS:   Reproductive organs:  Status post hysterectomy. Bladder: 2 mm stone in the bladder in the region of the right ureterovesical   junction. Not present on prior pelvic CT of 3/18/2015. BOWEL AND MESENTERY: Stomach appears unremarkable. No wall thickening or   dilatation of the small bowel. No significant colonic abnormalities. No   mesenteric mass or adenopathy. Appendix not identified. PERITONEUM: No ascites or free intraperitoneal air. RETROPERITONEUM: Aorta  tapers without aneurysm. No retroperitoneal adenopathy   or mass. No pelvic mass or adenopathy. BONES AND SOFT TISSUES: No significant bony abnormalities. CXR Results  (Last 48 hours)               11/02/17 1124  XR CHEST PA LAT Final result    Impression:  IMPRESSION:    Right basilar density likely related to right lung hyperinflation. Recommend   comparison with images from pending abdomen CT, which is now in progress. Narrative:  EXAM:  XR CHEST PA LAT       INDICATION:  ?Right PNA on portable       COMPARISON:  chest radiograph of earlier today and of 3/20/2017   FINDINGS:       PA and lateral radiographs of the chest demonstrate hyperinflation of the lungs. The density in the medial right lung base now appear similar to that on the   earlier chest radiograph of 3/20/2017 and likely relates to the lung   hyperinflation. Lungs now appear clear. 11/02/17 1055  XR CHEST PORT Final result    Impression:  IMPRESSION:        Question of focal airspace disease in the right lung base. Repeat PA and lateral   views of the chest are recommended for further evaluation when the patient's   condition permits. Narrative:  EXAM:  XR CHEST PORT       INDICATION:  pain       COMPARISON:  3/20/2017       FINDINGS:       A portable AP radiograph of the chest was obtained at 10:42 hours. There is   focal density in the medial right lung base which could represent focal airspace   disease or atelectasis in the right posterior costophrenic sulcus. The lungs are   otherwise clear. The cardiac silhouette is normal in size. There is no vascular   congestion or pleural fluid.   The bones and soft tissues are unremarkable. Vital Signs-Reviewed the patient's vital signs. Patient Vitals for the past 12 hrs:   Temp Pulse Resp BP SpO2   11/02/17 1115 - - - 171/81 100 %   11/02/17 1100 - - - (!) 194/94 100 %   11/02/17 1030 - - - (!) 175/92 100 %   11/02/17 1023 - - - (!) 179/99 99 %   11/02/17 0925 97.6 °F (36.4 °C) 99 18 (!) 204/102 100 %       Medications Given in the ED:  Medications   sodium chloride 0.9 % bolus infusion 500 mL (0 mL IntraVENous IV Completed 11/2/17 1055)   ondansetron (ZOFRAN) injection 4 mg (4 mg IntraVENous Given 11/2/17 1017)   diatrizoate meglumine-d.sodium (MD-GASTROVIEW,GASTROGRAFIN) 66-10 % contrast solution 30 mL (30 mL Oral Given 11/2/17 1017)   0.9% sodium chloride infusion (0 mL/hr IntraVENous IV Completed 11/2/17 1142)   iopamidol (ISOVUE-370) 76 % injection 100 mL (100 mL IntraVENous Given 11/2/17 1133)   sodium chloride (NS) flush 10 mL (10 mL IntraVENous Given 11/2/17 1133)   ketorolac (TORADOL) injection 15 mg (15 mg IntraVENous Given 11/2/17 1047)       Pulse Oximetry Analysis - Normal 100% on RA     Diagnosis   Clinical Impression:   1. Kidney stone    2. Diarrhea, unspecified type         Plan:  1:   Follow-up Information     Follow up With Details Comments Contact Info    Meliza Rivera NP In 4 days As needed [address]  [phone]      [facility] EMERGENCY DEPT  If symptoms worsen [facility]    Adele Donnelly MD  As needed [address]  [phone]          2:   Current Discharge Medication List      START taking these medications    Details   traMADol (ULTRAM) 50 mg tablet Take 1 Tab by mouth every six (6) hours as needed for Pain for up to 10 days. Max Daily Amount: 200 mg.   Qty: 20 Tab, Refills: 0      ondansetron (ZOFRAN ODT) 4 mg disintegrating tablet Take 1 Tab by mouth every eight (8) hours as needed for Nausea. Qty: 10 Tab, Refills: 0      loperamide (IMODIUM) 2 mg capsule Take 1 Cap by mouth four (4) times daily as needed for Diarrhea. Qty: 10 Cap, Refills: 0           Return to ED if Worse    Disposition Note:  DISCHARGE NOTE:  12:24 PM  The patient is ready for discharge. The patients signs, symptoms, diagnosis, and instructions for discharge have been discussed and the pt has conveyed their understanding. The patient is to follow up as recommended or return to the ER should their symptoms worsen. Plan has been discussed and patient has conveyed their agreement.    _______________________________   Attestations: This note is prepared by Kamala Galvez, acting as Scribe for Aleena Blair MD.    Aleena Blair MD: The scribe's documentation has been prepared under my direction and personally reviewed by me in its entirety.  I confirm that the note above accurately reflects all work, treatment, procedures, and medical decision making performed by me.  _______________________________

## 2017-11-02 NOTE — ED NOTES
Pt arrives from triage with c/o left sided flank pain and diarrhea that began about 4 hours ago. Pt reports not taking anything PO at home for the pain.

## 2017-11-02 NOTE — DISCHARGE INSTRUCTIONS
Diarrhea: Care Instructions  Your Care Instructions    Diarrhea is loose, watery stools (bowel movements). The exact cause is often hard to find. Sometimes diarrhea is your body's way of getting rid of what caused an upset stomach. Viruses, food poisoning, and many medicines can cause diarrhea. Some people get diarrhea in response to emotional stress, anxiety, or certain foods. Almost everyone has diarrhea now and then. It usually isn't serious, and your stools will return to normal soon. The important thing to do is replace the fluids you have lost, so you can prevent dehydration. The doctor has checked you carefully, but problems can develop later. If you notice any problems or new symptoms, get medical treatment right away. Follow-up care is a key part of your treatment and safety. Be sure to make and go to all appointments, and call your doctor if you are having problems. It's also a good idea to know your test results and keep a list of the medicines you take. How can you care for yourself at home? · Watch for signs of dehydration, which means your body has lost too much water. Dehydration is a serious condition and should be treated right away. Signs of dehydration are:  ¨ Increasing thirst and dry eyes and mouth. ¨ Feeling faint or lightheaded. ¨ Darker urine, and a smaller amount of urine than normal.  · To prevent dehydration, drink plenty of fluids, enough so that your urine is light yellow or clear like water. Choose water and other caffeine-free clear liquids until you feel better. If you have kidney, heart, or liver disease and have to limit fluids, talk with your doctor before you increase the amount of fluids you drink. · Begin eating small amounts of mild foods the next day, if you feel like it. ¨ Try yogurt that has live cultures of Lactobacillus. (Check the label.)  ¨ Avoid spicy foods, fruits, alcohol, and caffeine until 48 hours after all symptoms are gone.   ¨ Avoid chewing gum that contains sorbitol. ¨ Avoid dairy products (except for yogurt with Lactobacillus) while you have diarrhea and for 3 days after symptoms are gone. · The doctor may recommend that you take over-the-counter medicine, such as loperamide (Imodium), if you still have diarrhea after 6 hours. Read and follow all instructions on the label. Do not use this medicine if you have bloody diarrhea, a high fever, or other signs of serious illness. Call your doctor if you think you are having a problem with your medicine. When should you call for help? Call 911 anytime you think you may need emergency care. For example, call if:  ? · You passed out (lost consciousness). ? · Your stools are maroon or very bloody. ?Call your doctor now or seek immediate medical care if:  ? · You are dizzy or lightheaded, or you feel like you may faint. ? · Your stools are black and look like tar, or they have streaks of blood. ? · You have new or worse belly pain. ? · You have symptoms of dehydration, such as:  ¨ Dry eyes and a dry mouth. ¨ Passing only a little dark urine. ¨ Feeling thirstier than usual.   ? · You have a new or higher fever. ? Watch closely for changes in your health, and be sure to contact your doctor if:  ? · Your diarrhea is getting worse. ? · You see pus in the diarrhea. ? · You are not getting better after 2 days (48 hours). Where can you learn more? Go to http://guerrero-callie.info/. Enter A761 in the search box to learn more about \"Diarrhea: Care Instructions. \"  Current as of: March 20, 2017  Content Version: 11.4  © 2409-2627 [employer]. Care instructions adapted under license by [employer] (which disclaims liability or warranty for this information). If you have questions about a medical condition or this instruction, always ask your healthcare professional. [address] any warranty or liability for your use of this information. Kidney Stone: Care Instructions  Your Care Instructions    Kidney stones are formed when salts, minerals, and other substances normally found in the urine clump together. They can be as small as grains of sand or, rarely, as large as golf balls. While the stone is traveling through the ureter, which is the tube that carries urine from the kidney to the bladder, you will probably feel pain. The pain may be mild or very severe. You may also have some blood in your urine. As soon as the stone reaches the bladder, any intense pain should go away. If a stone is too large to pass on its own, you may need a medical procedure to help you pass the stone. The doctor has checked you carefully, but problems can develop later. If you notice any problems or new symptoms, get medical treatment right away. Follow-up care is a key part of your treatment and safety. Be sure to make and go to all appointments, and call your doctor if you are having problems. It's also a good idea to know your test results and keep a list of the medicines you take. How can you care for yourself at home? · Drink plenty of fluids, enough so that your urine is light yellow or clear like water. If you have kidney, heart, or liver disease and have to limit fluids, talk with your doctor before you increase the amount of fluids you drink. · Take pain medicines exactly as directed. Call your doctor if you think you are having a problem with your medicine. ¨ If the doctor gave you a prescription medicine for pain, take it as prescribed. ¨ If you are not taking a prescription pain medicine, ask your doctor if you can take an over-the-counter medicine. Read and follow all instructions on the label. · Your doctor may ask you to strain your urine so that you can collect your kidney stone when it passes. You can use a kitchen strainer or a tea strainer to catch the stone. Store it in a plastic bag until you see your doctor again.   Preventing future kidney stones  Some changes in your diet may help prevent kidney stones. Depending on the cause of your stones, your doctor may recommend that you:  · Drink plenty of fluids, enough so that your urine is light yellow or clear like water. If you have kidney, heart, or liver disease and have to limit fluids, talk with your doctor before you increase the amount of fluids you drink. · Limit coffee, tea, and alcohol. Also avoid grapefruit juice. · Do not take more than the recommended daily dose of vitamins C and D.  · Avoid antacids such as Gaviscon, Maalox, Mylanta, or Tums. · Limit the amount of salt (sodium) in your diet. · Eat a balanced diet that is not too high in protein. · Limit foods that are high in a substance called oxalate, which can cause kidney stones. These foods include dark green vegetables, rhubarb, chocolate, wheat bran, nuts, cranberries, and beans. When should you call for help? Call your doctor now or seek immediate medical care if:  ? · You cannot keep down fluids. ? · Your pain gets worse. ? · You have a fever or chills. ? · You have new or worse pain in your back just below your rib cage (the flank area). ? · You have new or more blood in your urine. ? Watch closely for changes in your health, and be sure to contact your doctor if:  ? · You do not get better as expected. Where can you learn more? Go to http://guerrero-callie.info/. Enter V318 in the search box to learn more about \"Kidney Stone: Care Instructions. \"  Current as of: May 12, 2017  Content Version: 11.4  © 7679-1653 [employer]. Care instructions adapted under license by [employer] (which disclaims liability or warranty for this information). If you have questions about a medical condition or this instruction, always ask your healthcare professional. [address] any warranty or liability for your use of this information.

## 2017-11-03 ENCOUNTER — PATIENT OUTREACH (OUTPATIENT)
Dept: OTHER | Age: 62
End: 2017-11-03

## 2017-11-03 NOTE — PROGRESS NOTES
Patient on report as eligible for Case Management. ED visit for left flank pain, nausea and diarrhea. Left discreet message on voicemail with this CM contact information. Will attempt to contact again to offer [phone] Management services.

## 2017-11-06 ENCOUNTER — PATIENT OUTREACH (OUTPATIENT)
Dept: OTHER | Age: 62
End: 2017-11-06

## 2017-11-06 NOTE — LETTER
11/6/2017 10:51 AM 
 
Ms. Jacqui Gonsalves [address] [facility] [id number] Dear Ms. Belkys Balderas, My name is Norris Gant RN, Employee Care Manager for [employer], and I have been trying to reach you. The Employee Care  is a free-of-charge, confidential service provided to our employees and their family members covered by the [employer] Part of my job is to follow up with members who have recently been in the hospital or emergency room, to help them coordinate their care and answer questions they may have about their visit. I am able to provide assistance with medication questions, scheduling needed follow-up appointments, and arranging services like home health or home medical equipment. I can also provide education regarding your hospital or ER visit as well as your medical conditions. As healthcare providers, we know that patients do better when they have close follow up with a primary care provider (PCP), especially after a hospital or emergency department visit. If you do not have a PCP, I can help you find one that is convenient to you and covered by your insurance. I can also help you understand any after visit instructions, such as what symptoms to watch out for, or any new or changed medications. Remember that you can access your After Visit Summary by logging into your [employer] account. If you do not have a [employer] account, I can help you request access. Our program is designed to provide you with the opportunity to have a [employer] care manager partner with you for your healthcare needs. Please contact me at the below number if I can provide you with assistance for any of the above services. Sincerely, Norris Gant RN, BSN Employee Care Manager Cell: [phone]

## 2017-11-06 NOTE — PROGRESS NOTES
Second attempt to reach patient for [facility], and discharge assessment. Discreet VM left. Will send UTR letter.

## 2017-11-07 ENCOUNTER — OFFICE VISIT (OUTPATIENT)
Dept: INTERNAL MEDICINE CLINIC | Age: 62
End: 2017-11-07

## 2017-11-07 VITALS
RESPIRATION RATE: 18 BRPM | DIASTOLIC BLOOD PRESSURE: 69 MMHG | TEMPERATURE: 97.3 F | HEIGHT: 59 IN | SYSTOLIC BLOOD PRESSURE: 128 MMHG | WEIGHT: 88.8 LBS | HEART RATE: 80 BPM | BODY MASS INDEX: 17.9 KG/M2 | OXYGEN SATURATION: 100 %
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
## 2017-11-07 DIAGNOSIS — R10.9 LEFT FLANK PAIN: Primary | ICD-10-CM

## 2017-11-07 DIAGNOSIS — I10 ESSENTIAL HYPERTENSION: ICD-10-CM

## 2017-11-07 RX ORDER — AMLODIPINE BESYLATE 10 MG/1
10 TABLET ORAL DAILY
Qty: 90 TAB | Refills: 3 | Status: SHIPPED | OUTPATIENT
Start: 2017-11-07 | End: 2018-07-12 | Stop reason: SDUPTHER

## 2017-11-07 RX ORDER — KETOROLAC TROMETHAMINE 10 MG/1
10 TABLET, FILM COATED ORAL
Qty: 30 TAB | Refills: 0 | Status: SHIPPED | OUTPATIENT
Start: 2017-11-07 | End: 2018-07-12

## 2017-11-07 RX ORDER — PERMETHRIN 50 MG/G
CREAM TOPICAL
Qty: 60 G | Refills: 0 | Status: SHIPPED | OUTPATIENT
Start: 2017-11-07 | End: 2018-07-12

## 2017-11-07 RX ORDER — HYDROCHLOROTHIAZIDE 25 MG/1
25 TABLET ORAL DAILY
Qty: 90 TAB | Refills: 3 | Status: SHIPPED | OUTPATIENT
Start: 2017-11-07 | End: 2018-07-12 | Stop reason: SDUPTHER

## 2017-11-07 NOTE — PATIENT INSTRUCTIONS
Try Immodium to help with your diarrhea, following instructions on box. Do NOT Exceed 6mg/day. -OR- 1 tsp of flour in about 8 oz. of warm water. Eat a high-fiber diet and drink plenty of fluids. Learning About Diet for Kidney Stone Prevention  What are kidney stones? Kidney stones are made of salts and minerals in the urine that form small \"bella. \" Stones can form in the kidneys and the ureters (the tubes that lead from the kidneys to the bladder). They can also form in the bladder. Stones may not cause a problem as long as they stay in the kidneys. But they can cause sudden, severe pain. Pain is most likely when the stones travel from the kidneys to the bladder. Kidney stones can cause bloody urine. Kidney stones often run in families. You are more likely to get them if you don't drink enough fluids, mainly water. Certain foods and drinks and some dietary supplements may also increase your risk for kidney stones if you consume too much of them. What can you do to prevent kidney stones? Changing what you eat may not prevent all types of kidney stones. But for people who have a history of certain kinds of kidney stones, some changes in diet may help. A dietitian can help you set up a meal plan that includes healthy, low-oxalate choices. Here are some general guidelines to get you started. Plan your meals and snacks around foods that are low in oxalate. These foods include:  · Corn, kale, parsnips, and squash,. · Beef, chicken, pork, turkey, and fish. · Milk, butter, cheese, and yogurt. You can eat certain foods that are medium-high in oxalate, but eat them only once in a while. These foods include:  · Bread. · Brown rice. · English muffins. · Figs. · Popcorn. · String beans. · Tomatoes. Limit very high-oxalate foods, including:  · Black tea. · Coffee. · Chocolate. · Dark green vegetables. · Nuts. Here are some other things you can do to help prevent kidney stones.   · Drink plenty of fluids. If you have kidney, heart, or liver disease and have to limit fluids, talk with your doctor before you increase the amount of fluids you drink. · Do not take more than the recommended daily dose of vitamins C and D.  · Limit the salt in your diet. · Eat a balanced diet that is not too high in protein. Follow-up care is a key part of your treatment and safety. Be sure to make and go to all appointments, and call your doctor if you are having problems. It's also a good idea to know your test results and keep a list of the medicines you take. Where can you learn more? Go to http://guerrero-callie.info/. Enter C138 in the search box to learn more about \"Learning About Diet for Kidney Stone Prevention. \"  Current as of: May 12, 2017  Content Version: 11.4  © 4295-3528 [employer]. Care instructions adapted under license by [employer] (which disclaims liability or warranty for this information). If you have questions about a medical condition or this instruction, always ask your healthcare professional. [address] any warranty or liability for your use of this information. Kidney Stone: Care Instructions  Your Care Instructions    Kidney stones are formed when salts, minerals, and other substances normally found in the urine clump together. They can be as small as grains of sand or, rarely, as large as golf balls. While the stone is traveling through the ureter, which is the tube that carries urine from the kidney to the bladder, you will probably feel pain. The pain may be mild or very severe. You may also have some blood in your urine. As soon as the stone reaches the bladder, any intense pain should go away. If a stone is too large to pass on its own, you may need a medical procedure to help you pass the stone. The doctor has checked you carefully, but problems can develop later.  If you notice any problems or new symptoms, get medical treatment right away. Follow-up care is a key part of your treatment and safety. Be sure to make and go to all appointments, and call your doctor if you are having problems. It's also a good idea to know your test results and keep a list of the medicines you take. How can you care for yourself at home? · Drink plenty of fluids, enough so that your urine is light yellow or clear like water. If you have kidney, heart, or liver disease and have to limit fluids, talk with your doctor before you increase the amount of fluids you drink. · Take pain medicines exactly as directed. Call your doctor if you think you are having a problem with your medicine. ¨ If the doctor gave you a prescription medicine for pain, take it as prescribed. ¨ If you are not taking a prescription pain medicine, ask your doctor if you can take an over-the-counter medicine. Read and follow all instructions on the label. · Your doctor may ask you to strain your urine so that you can collect your kidney stone when it passes. You can use a kitchen strainer or a tea strainer to catch the stone. Store it in a plastic bag until you see your doctor again. Preventing future kidney stones  Some changes in your diet may help prevent kidney stones. Depending on the cause of your stones, your doctor may recommend that you:  · Drink plenty of fluids, enough so that your urine is light yellow or clear like water. If you have kidney, heart, or liver disease and have to limit fluids, talk with your doctor before you increase the amount of fluids you drink. · Limit coffee, tea, and alcohol. Also avoid grapefruit juice. · Do not take more than the recommended daily dose of vitamins C and D.  · Avoid antacids such as Gaviscon, Maalox, Mylanta, or Tums. · Limit the amount of salt (sodium) in your diet. · Eat a balanced diet that is not too high in protein. · Limit foods that are high in a substance called oxalate, which can cause kidney stones. These foods include dark green vegetables, rhubarb, chocolate, wheat bran, nuts, cranberries, and beans. When should you call for help? Call your doctor now or seek immediate medical care if:  ? · You cannot keep down fluids. ? · Your pain gets worse. ? · You have a fever or chills. ? · You have new or worse pain in your back just below your rib cage (the flank area). ? · You have new or more blood in your urine. ? Watch closely for changes in your health, and be sure to contact your doctor if:  ? · You do not get better as expected. Where can you learn more? Go to http://guerreroMediTAPcallie.info/. Enter U442 in the search box to learn more about \"Kidney Stone: Care Instructions. \"  Current as of: May 12, 2017  Content Version: 11.4  © 1848-5586 [employer]. Care instructions adapted under license by [employer] (which disclaims liability or warranty for this information). If you have questions about a medical condition or this instruction, always ask your healthcare professional. [place] [address] any warranty or liability for your use of this information. Bedbugs: Care Instructions  Your Care Instructions    Bedbugs are tiny bugs that feed on blood from animals or people. They have that name because they like to hide in bedding and mattresses. Bedbugs are not known to spread disease to people. But itching from the bites can be so bad that you may scratch hard enough to break the skin. That can lead to infection. The bites can also cause an allergic reaction in some people. The bugs can spread into cracks and crevices in the room and lay their eggs in anything that is in the room, including clothing and furniture. This makes them very hard to kill. Getting rid of bedbugs  The best way to get rid of bedbugs is to call a professional pest control company. They use special pesticides and other equipment to kill the bugs and their eggs.   If you decide to buy your own pesticide, check the label. Make sure it says that it is for bedbugs. Be sure to follow the directions carefully. You may have to use the pesticide more than once. Do other cleaning steps such as:  · Vacuum often. Be sure to empty the vacuum after each use. If you use a vacuum bag, seal it and throw it out in an outdoor trash can. If you don't use a vacuum bag, empty the container and clean it with hot, soapy water. · Launder things that might hide bugs. Washing and then drying items in a dryer on a hot setting is adequate to kill bedbugs in clothing or linens. Turn the dryer to the hottest setting that the fabric can handle. · Use mattress, box spring, and pillow (encasement) sacks to trap bed bugs and help get rid of them. Be sure to follow the directions on the package. After your mattress has been cleared of bedbugs, you can buy a special mattress cover that is made to keep bedbugs out of your mattress. Follow-up care is a key part of your treatment and safety. Be sure to make and go to all appointments, and call your doctor if you are having problems. It's also a good idea to know your test results and keep a list of the medicines you take. How can you care for yourself at home? · Wash the bites with soap to lower the chance of infection. Try not to scratch. · Use calamine lotion or an anti-itch cream to stop the itching. You can also hold an oatmeal-soaked washcloth on the itchy area for 15 minutes. You can buy an oatmeal powder, such as Aveeno Colloidal Oatmeal, in drugstores. · Use an ice pack to stop the swelling. When should you call for help? Call your doctor now or seek immediate medical care if:  ? · You have signs of infection, such as:  ¨ Increased pain, swelling, warmth, or redness. ¨ Red streaks leading from a bite area. ¨ Pus draining from a bite area. ¨ A fever. ? Watch closely for changes in your health, and be sure to contact your doctor if:  ?  · Anyone else in your family has itching. ? · You do not get better as expected. Where can you learn more? Go to http://guerrero-callie.info/. Enter G246 in the search box to learn more about \"Bedbugs: Care Instructions. \"  Current as of: March 20, 2017  Content Version: 11.4  © 2291-9695 [employer]. Care instructions adapted under license by [employer] (which disclaims liability or warranty for this information). If you have questions about a medical condition or this instruction, always ask your healthcare professional. [address] any warranty or liability for your use of this information.

## 2017-11-07 NOTE — PROGRESS NOTES
Waldemar Patino is a 62 y.o. female and presents with Medication Refill (orders pending); ED Follow-up [facility], [facility] for kidney stones 11/2/17); and Diarrhea (pt states she has soft runny stools)    Subjective:  Pt is here for ER follow-up on 11/2 for left flank pain and diarrhea. Diagnosed with renal colic and flank pain. Was given zofran and tramadol. Instructed to f/u with PCP/specialty. Reports feeling BETTER THAN when in ER. Diarrhea mostly resolved, now with loose stool in the morning only x 1. No nausea since onset. Denies dysuria, hematuria, and urinary frequency. Followed by Dr. Mirian Davis for renal mass in past.     Also concerned for itching and bruise to right forearm from IV. Otherwise no acute complaints. Hypertension Review:  The patient has hypertension  Diet and Lifestyle: generally does try to follow a  low sodium diet, exercises sporadically   Home BP Monitoring: is not measured at home. Pertinent ROS: taking medications as instructed, no medication side effects noted. No TIA's, chest pain on exertion, dyspnea on exertion, or swelling of ankles.    BP Readings from Last 3 Encounters:   11/07/17 128/69   11/02/17 171/81   07/17/17 151/81     Review of Systems  Constitutional: negative for fevers, chills, anorexia and weight loss  Respiratory:  negative for cough, hemoptysis, dyspnea, and wheezing  CV:   negative for chest pain, palpitations, and lower extremity edema  GI:   negative for nausea, vomiting, diarrhea, abdominal pain, and melena  Endo:               negative for polyuria,polydipsia,polyphagia, and heat intolerance  Genitourinary: negative for frequency, urgency, dysuria, retention, and hematuria  Integument:  negative for rash, ulcerations  Hematologic:  negative for easy bruising and bleeding  Musculoskel: negative for arthralgias, muscle weakness,and joint pain/swelling  Neurological:  negative for headaches, dizziness, vertigo,and memory/gait problems  Behavl/Psych: negative for feelings of anxiety, depression, suicide, and mood changes    Past Medical History:   Diagnosis Date    Chronic leg pain 11/3/2010    fibromyalgia    COPD (chronic obstructive pulmonary disease) ([facility] [address] 9/18/2009    GERD (gastroesophageal reflux disease)     History of kidney cancer     Hypertension 9/18/2009    Leukopenia 9/18/2009    Other ill-defined conditions(799.89)     fibromyalgia    Vitamin D deficiency 7/7/2010     Past Surgical History:   Procedure Laterality Date    HX GYN      hysterectomy     Social History     Social History    Marital status: SINGLE     Spouse name: N/A    Number of children: N/A    Years of education: N/A     Social History Main Topics    Smoking status: Current Every Day Smoker     Packs/day: 0.50    Smokeless tobacco: Never Used    Alcohol use Yes      Comment: occasionally    Drug use: No    Sexual activity: Yes     Partners: Male     Birth control/ protection: Surgical     Other Topics Concern    None     Social History Narrative     Family History   Problem Relation Age of Onset    Diabetes Mother     Alcohol abuse Father     Cancer Sister      breast    Cancer Brother      breast    No Known Problems Sister     No Known Problems Brother      Current Outpatient Prescriptions   Medication Sig Dispense Refill    hydroCHLOROthiazide (HYDRODIURIL) 25 mg tablet Take 1 Tab by mouth daily. 90 Tab 3    amLODIPine (NORVASC) 10 mg tablet Take 1 Tab by mouth daily. 90 Tab 3    ketorolac (TORADOL) 10 mg tablet Take 1 Tab by mouth every six (6) hours as needed for Pain. Indications: Renal Colic 30 Tab 0    permethrin (ELIMITE) 5 % topical cream Thoroughly massage cream (30 g for average adult) from head to toe; leave on for 8-14 hr before showering; may repeat in 14 days. 60 g 0    traMADol (ULTRAM) 50 mg tablet Take 1 Tab by mouth every six (6) hours as needed for Pain for up to 10 days.  Max Daily Amount: 200 mg. 20 Tab 0    ondansetron (ZOFRAN ODT) 4 mg disintegrating tablet Take 1 Tab by mouth every eight (8) hours as needed for Nausea. 10 Tab 0    loperamide (IMODIUM) 2 mg capsule Take 1 Cap by mouth four (4) times daily as needed for Diarrhea. 10 Cap 0    guaiFENesin SR (MUCINEX) 600 mg SR tablet Take 1 Tab by mouth two (2) times a day. 30 Tab 0     Allergies   Allergen Reactions    Lisinopril Cough     Other reaction(s): Adverse reaction to substance     Lipitor [Atorvastatin] Vertigo       Objective:  Visit Vitals    /69 (BP 1 Location: Right arm, BP Patient Position: Sitting)    Pulse 80    Temp 97.3 °F (36.3 °C) (Oral)    Resp 18    Ht 4' 11\" (1.499 m)    Wt 88 lb 12.8 oz (40.3 kg)    SpO2 100%    BMI 17.94 kg/m2     Wt Readings from Last 3 Encounters:   11/07/17 88 lb 12.8 oz (40.3 kg)   11/02/17 88 lb 6.5 oz (40.1 kg)   07/17/17 84 lb 5 oz (38.2 kg)     Physical Exam:   General appearance - alert, well appearing, and in no distress. Mental status - A/O x 4, normal mood and affect. Neck -Supple ,normal CSP. FROM, non-tender. No significant adenopathy/thyromegaly. No JVD. Chest - CTA. Symmetric chest rise. No wheezing, rales or rhonchi. Heart - Normal rate, regular rhythm. Normal S1, S2. No MGR or clicks. Abdomen - Soft,non-distended. Normoactive BS in all quadrants. NT, no mass or HSM. No CVAT. Ext- Radial, DP pulses, 2+ bilaterally. No pedal edema, clubbing, or cyanosis. Skin-Warm and dry. No hyperpigmentation, ulcerations, or suspicious lesions. Neuro - Normal speech, no focal findings or movement disorder. Normal strength, gait, and muscle tone.   + BED BUG on clothing. Assessment/Plan:  Bed bug removed, then pt mentioned pruritus for past 2 days. 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 elimite prescribed and advised pt to return to Dr. Haydee Pino for continued flank pain. toradol trial started.    Medication Side Effects and Warnings were discussed with patient: yes   Patient Labs were reviewed: yes  Patient Past Records were reviewed: yes    See below for other orders   Follow-up Disposition:  Return in about 3 months (around 2/7/2018) for HTN with labs with Berry Dominguez. Pt has given consent verbally while in office for [employer]. ICD-10-CM ICD-9-CM    1. Left flank pain R10.9 789.09 ketorolac (TORADOL) 10 mg tablet   2. Essential hypertension I10 401.9 hydroCHLOROthiazide (HYDRODIURIL) 25 mg tablet      amLODIPine (NORVASC) 10 mg tablet   3. Bedbug bite, initial encounter W57. XXXA 919.4 permethrin (ELIMITE) 5 % topical cream     Orders Placed This Encounter    hydroCHLOROthiazide (HYDRODIURIL) 25 mg tablet     Sig: Take 1 Tab by mouth daily. Dispense:  90 Tab     Refill:  3    amLODIPine (NORVASC) 10 mg tablet     Sig: Take 1 Tab by mouth daily. Dispense:  90 Tab     Refill:  3    ketorolac (TORADOL) 10 mg tablet     Sig: Take 1 Tab by mouth every six (6) hours as needed for Pain. Indications: Renal Colic     Dispense:  30 Tab     Refill:  0    permethrin (ELIMITE) 5 % topical cream     Sig: Thoroughly massage cream (30 g for average adult) from head to toe; leave on for 8-14 hr before showering; may repeat in 14 days. Dispense:  60 g     Refill:  0       Allyson Setters expressed understanding of plan. An After Visit Summary was offered/printed and given to the patient.

## 2017-11-07 NOTE — MR AVS SNAPSHOT
Visit Information Date & Time Provider Department Dept. Phone Encounter #  
 11/7/2017 11:20 AM Romeo Floyd NP [address] [facility] [phone] [id number] Follow-up Instructions Return in about 3 months (around 2/7/2018) for HTN with labs with Rachael Scott. Upcoming Health Maintenance Date Due ZOSTER VACCINE AGE 60> 10/6/2016 BREAST CANCER SCRN MAMMOGRAM 1/30/2017 Pneumococcal 19-64 Highest Risk (2 of 3 - PCV13) 11/30/2017 COLONOSCOPY 10/5/2021 DTaP/Tdap/Td series (2 - Td) 8/22/2026 Allergies as of 11/7/2017  Review Complete On: 11/7/2017 By: Ernst Caban. EVER Ayon Severity Noted Reaction Type Reactions Lisinopril Medium 07/05/2011    Cough Other reaction(s): Adverse reaction to substance Lipitor [Atorvastatin]  11/07/2017   Side Effect Vertigo Current Immunizations  Never Reviewed Name Date Influenza Vaccine 11/10/2015 Pneumococcal Polysaccharide (PPSV-23) 11/30/2016 11:17 AM  
 Td, Adsorbed PF 3/19/2015 Tdap 8/22/2016  9:08 AM  
  
 Not reviewed this visit You Were Diagnosed With   
  
 Codes Comments Left flank pain    -  Primary ICD-10-CM: R10.9 ICD-9-CM: 789.09 Essential hypertension     ICD-10-CM: I10 
ICD-9-CM: 401.9 Bedbug bite, initial encounter     ICD-10-CM: W57. Fritzi Jaz ICD-9-CM: 919.4 Vitals BP Pulse Temp Resp Height(growth percentile) Weight(growth percentile) 128/69 (BP 1 Location: Right arm, BP Patient Position: Sitting) 80 97.3 °F (36.3 °C) (Oral) 18 4' 11\" (1.499 m) 88 lb 12.8 oz (40.3 kg) SpO2 BMI OB Status Smoking Status 100% 17.94 kg/m2 Hysterectomy Current Every Day Smoker Vitals History BMI and BSA Data Body Mass Index Body Surface Area  
 17.94 kg/m 2 1.3 m 2 Preferred Pharmacy Pharmacy Name Phone [id number] [place], [address] [phone] Your Updated Medication List  
  
 This list is accurate as of: 11/7/17  1:06 PM.  Always use your most recent med list. amLODIPine 10 mg tablet Commonly known as:  Suzon Salle Take 1 Tab by mouth daily. guaiFENesin  mg SR tablet Commonly known as:  Brandon & Brandon Take 1 Tab by mouth two (2) times a day. hydroCHLOROthiazide 25 mg tablet Commonly known as:  HYDRODIURIL Take 1 Tab by mouth daily. ketorolac 10 mg tablet Commonly known as:  TORADOL Take 1 Tab by mouth every six (6) hours as needed for Pain. Indications: Renal Colic  
  
 loperamide 2 mg capsule Commonly known as:  IMODIUM Take 1 Cap by mouth four (4) times daily as needed for Diarrhea. ondansetron 4 mg disintegrating tablet Commonly known as:  ZOFRAN ODT Take 1 Tab by mouth every eight (8) hours as needed for Nausea. permethrin 5 % topical cream  
Commonly known as:  Wilner Jose Thoroughly massage cream (30 g for average adult) from head to toe; leave on for 8-14 hr before showering; may repeat in 14 days. traMADol 50 mg tablet Commonly known as:  ULTRAM  
Take 1 Tab by mouth every six (6) hours as needed for Pain for up to 10 days. Max Daily Amount: 200 mg. Prescriptions Sent to Pharmacy Refills  
 hydroCHLOROthiazide (HYDRODIURIL) 25 mg tablet 3 Sig: Take 1 Tab by mouth daily. Class: Normal  
 Pharmacy: [facility][id number]- [address] Ph #: [phone] Route: Oral  
 amLODIPine (NORVASC) 10 mg tablet 3 Sig: Take 1 Tab by mouth daily. Class: Normal  
 Pharmacy: [facility]/pharmacy #[id number]- [address] Ph #: [phone] Route: Oral  
 ketorolac (TORADOL) 10 mg tablet 0 Sig: Take 1 Tab by mouth every six (6) hours as needed for Pain. Indications: Renal Colic  Class: Normal  
 Pharmacy: [facility]/pharmacy #[id number]- [address] [facility] Ph #: [phone] Route: Oral  
 permethrin (ELIMITE) 5 % topical cream 0 Sig: Thoroughly massage cream (30 g for average adult) from head to toe; leave on for 8-14 hr before showering; may repeat in 14 days. Class: Normal  
 Pharmacy: [facility]/pharmacy #[id number]- [address] Ph #: [phone] Follow-up Instructions Return in about 3 months (around 2/7/2018) for HTN with labs with Calvin Avery. Patient Instructions Try Immodium to help with your diarrhea, following instructions on box. Do NOT Exceed 6mg/day. -OR- 1 tsp of flour in about 8 oz. of warm water. Eat a high-fiber diet and drink plenty of fluids. Learning About Diet for Kidney Stone Prevention What are kidney stones? Kidney stones are made of salts and minerals in the urine that form small \"bella. \" Stones can form in the kidneys and the ureters (the tubes that lead from the kidneys to the bladder). They can also form in the bladder. Stones may not cause a problem as long as they stay in the kidneys. But they can cause sudden, severe pain. Pain is most likely when the stones travel from the kidneys to the bladder. Kidney stones can cause bloody urine. Kidney stones often run in families. You are more likely to get them if you don't drink enough fluids, mainly water. Certain foods and drinks and some dietary supplements may also increase your risk for kidney stones if you consume too much of them. What can you do to prevent kidney stones? Changing what you eat may not prevent all types of kidney stones. But for people who have a history of certain kinds of kidney stones, some changes in diet may help. A dietitian can help you set up a meal plan that includes healthy, low-oxalate choices. Here are some general guidelines to get you started. Plan your meals and snacks around foods that are low in oxalate. These foods include: · Corn, kale, parsnips, and squash,. · Beef, chicken, pork, turkey, and fish. · Milk, butter, cheese, and yogurt. You can eat certain foods that are medium-high in oxalate, but eat them only once in a while. These foods include: · Bread. · Brown rice. · English muffins. · Figs. · Popcorn. · String beans. · Tomatoes. Limit very high-oxalate foods, including: · Black tea. · Coffee. · Chocolate. · Dark green vegetables. · Nuts. Here are some other things you can do to help prevent kidney stones. · Drink plenty of fluids. If you have kidney, heart, or liver disease and have to limit fluids, talk with your doctor before you increase the amount of fluids you drink. · Do not take more than the recommended daily dose of vitamins C and D. 
· Limit the salt in your diet. · Eat a balanced diet that is not too high in protein. Follow-up care is a key part of your treatment and safety. Be sure to make and go to all appointments, and call your doctor if you are having problems. It's also a good idea to know your test results and keep a list of the medicines you take. Where can you learn more? Go to http://guerrero-callie.info/. Enter C138 in the search box to learn more about \"Learning About Diet for Kidney Stone Prevention. \" Current as of: May 12, 2017 Content Version: 11.4 © 1265-8727 [email or web address]. Care instructions adapted under license by Diana (which disclaims liability or warranty for this information). If you have questions about a medical condition or this instruction, always ask your healthcare professional. [place] [address] any warranty or liability for your use of this information. Kidney Stone: Care Instructions Your Care Instructions Kidney stones are formed when salts, minerals, and other substances normally found in the urine clump together. They can be as small as grains of sand or, rarely, as large as golf balls. While the stone is traveling through the ureter, which is the tube that carries urine from the kidney to the bladder, you will probably feel pain. The pain may be mild or very severe. You may also have some blood in your urine. As soon as the stone reaches the bladder, any intense pain should go away. If a stone is too large to pass on its own, you may need a medical procedure to help you pass the stone. The doctor has checked you carefully, but problems can develop later. If you notice any problems or new symptoms, get medical treatment right away. Follow-up care is a key part of your treatment and safety. Be sure to make and go to all appointments, and call your doctor if you are having problems. It's also a good idea to know your test results and keep a list of the medicines you take. How can you care for yourself at home? · Drink plenty of fluids, enough so that your urine is light yellow or clear like water. If you have kidney, heart, or liver disease and have to limit fluids, talk with your doctor before you increase the amount of fluids you drink. · Take pain medicines exactly as directed. Call your doctor if you think you are having a problem with your medicine. ¨ If the doctor gave you a prescription medicine for pain, take it as prescribed. ¨ If you are not taking a prescription pain medicine, ask your doctor if you can take an over-the-counter medicine. Read and follow all instructions on the label. · Your doctor may ask you to strain your urine so that you can collect your kidney stone when it passes. You can use a kitchen strainer or a tea strainer to catch the stone. Store it in a plastic bag until you see your doctor again. Preventing future kidney stones Some changes in your diet may help prevent kidney stones. Depending on the cause of your stones, your doctor may recommend that you: · Drink plenty of fluids, enough so that your urine is light yellow or clear like water. If you have kidney, heart, or liver disease and have to limit fluids, talk with your doctor before you increase the amount of fluids you drink. · Limit coffee, tea, and alcohol. Also avoid grapefruit juice. · Do not take more than the recommended daily dose of vitamins C and D. 
· Avoid antacids such as Gaviscon, Maalox, Mylanta, or Tums. · Limit the amount of salt (sodium) in your diet. · Eat a balanced diet that is not too high in protein. · Limit foods that are high in a substance called oxalate, which can cause kidney stones. These foods include dark green vegetables, rhubarb, chocolate, wheat bran, nuts, cranberries, and beans. When should you call for help? Call your doctor now or seek immediate medical care if: 
? · You cannot keep down fluids. ? · Your pain gets worse. ? · You have a fever or chills. ? · You have new or worse pain in your back just below your rib cage (the flank area). ? · You have new or more blood in your urine. ? Watch closely for changes in your health, and be sure to contact your doctor if: 
? · You do not get better as expected. Where can you learn more? Go to http://guerrero-callie.info/. Enter V987 in the search box to learn more about \"Kidney Stone: Care Instructions. \" Current as of: May 12, 2017 Content Version: 11.4 © 2172-1739 C3Nano. Care instructions adapted under license by Invisible Puppy (which disclaims liability or warranty for this information). If you have questions about a medical condition or this instruction, always ask your healthcare professional. [place] [address] any warranty or liability for your use of this information. Bedbugs: Care Instructions Your Care Instructions Bedbugs are tiny bugs that feed on blood from animals or people. They have that name because they like to hide in bedding and mattresses. Bedbugs are not known to spread disease to people. But itching from the bites can be so bad that you may scratch hard enough to break the skin. That can lead to infection. The bites can also cause an allergic reaction in some people. The bugs can spread into cracks and crevices in the room and lay their eggs in anything that is in the room, including clothing and furniture. This makes them very hard to kill. Getting rid of bedbugs The best way to get rid of bedbugs is to call a professional pest control company. They use special pesticides and other equipment to kill the bugs and their eggs. If you decide to buy your own pesticide, check the label. Make sure it says that it is for bedbugs. Be sure to follow the directions carefully. You may have to use the pesticide more than once. Do other cleaning steps such as: · Vacuum often. Be sure to empty the vacuum after each use. If you use a vacuum bag, seal it and throw it out in an outdoor trash can. If you don't use a vacuum bag, empty the container and clean it with hot, soapy water. · Launder things that might hide bugs. Washing and then drying items in a dryer on a hot setting is adequate to kill bedbugs in clothing or linens. Turn the dryer to the hottest setting that the fabric can handle. · Use mattress, box spring, and pillow (encasement) sacks to trap bed bugs and help get rid of them. Be sure to follow the directions on the package. After your mattress has been cleared of bedbugs, you can buy a special mattress cover that is made to keep bedbugs out of your mattress. Follow-up care is a key part of your treatment and safety. Be sure to make and go to all appointments, and call your doctor if you are having problems. It's also a good idea to know your test results and keep a list of the medicines you take. How can you care for yourself at home? · Wash the bites with soap to lower the chance of infection. Try not to scratch. · Use calamine lotion or an anti-itch cream to stop the itching. You can also hold an oatmeal-soaked washcloth on the itchy area for 15 minutes. You can buy an oatmeal powder, such as Aveeno Colloidal Oatmeal, in drugstores. · Use an ice pack to stop the swelling. When should you call for help? Call your doctor now or seek immediate medical care if: 
? · You have signs of infection, such as: 
¨ Increased pain, swelling, warmth, or redness. ¨ Red streaks leading from a bite area. ¨ Pus draining from a bite area. ¨ A fever. ? Watch closely for changes in your health, and be sure to contact your doctor if: 
? · Anyone else in your family has itching. ? · You do not get better as expected. Where can you learn more? Go to http://guerreroSawerlycallie.info/. Enter G246 in the search box to learn more about \"Bedbugs: Care Instructions. \" Current as of: March 20, 2017 Content Version: 11.4 © 6151-0666 Banno. Care instructions adapted under license by Waveborn (which disclaims liability or warranty for this information). If you have questions about a medical condition or this instruction, always ask your healthcare professional. [address] any warranty or liability for your use of this information. Introducing [facility]! Dear Yoko Kraus: Thank you for requesting a Tibersoft account. Our records indicate that you already have an active Tibersoft account. You can access your account anytime at [email or web address] Did you know that you can access your hospital and ER discharge instructions at any time in Tibersoft? You can also review all of your test results from your hospital stay or ER visit. Additional Information If you have questions, please visit the Frequently Asked Questions section of the Tibersoft website at [email or web address] Remember, MyChart is NOT to be used for urgent needs. For medical emergencies, dial 911. Now available from your iPhone and Android! Please provide this summary of care documentation to your next provider. Your primary care clinician is listed as Hitesh Rapp. If you have any questions after today's visit, please call [phone].

## 2017-11-07 NOTE — PROGRESS NOTES
Pt is here for   Chief Complaint   Patient presents with    Medication Refill     orders pending    ED Follow-up     [facility] for kidney stones     Pt states pain level is a     1. Have you been to the ER, urgent care clinic since your last visit? Hospitalized since your last visit? Yes When: [facility]    2. Have you seen or consulted any other health care providers outside of the [address] [place] [address] since your last visit? Include any pap smears or colon screening.  No

## 2017-11-09 ENCOUNTER — HOSPITAL ENCOUNTER (OUTPATIENT)
Dept: GENERAL RADIOLOGY | Age: 62
Discharge: HOME | End: 2017-11-09
Payer: COMMERCIAL

## 2017-11-09 DIAGNOSIS — N20.0 KIDNEY STONE: ICD-10-CM

## 2017-11-09 PROCEDURE — 74000 XR ABD (KUB): CPT

## 2017-12-08 ENCOUNTER — PATIENT OUTREACH (OUTPATIENT)
Dept: OTHER | Age: 62
End: 2017-12-08

## 2017-12-08 NOTE — LETTER
Ms. Corie Williamson [address] [place], [place] [address] [id number] Dear Ms. Carole Harris, My name is Farhana Burr RN , Employee Care Manager for [facility], and I have been trying to reach you. The Employee Care  is a free-of-charge, confidential service provided to our employees and their family members covered by the [facility]. Part of my job is to follow up with members who have recently been in the hospital or emergency room, to help them coordinate their care and answer questions they may have about their visit. I am able to provide assistance with medication questions, scheduling needed follow-up appointments, and arranging services like home health or home medical equipment. I can also provide education regarding your hospital or ER visit as well as your medical conditions. As healthcare providers, we know that patients do better when they have close follow up with a primary care provider (PCP), especially after a hospital or emergency department visit. If you do not have a PCP, I can help you find one that is convenient to you and covered by your insurance. I can also help you understand any after visit instructions, such as what symptoms to watch out for, or any new or changed medications. Employee Care Management now partners with [employer]. If you are a qualifying employee, you may receive an additional 10 wellness incentive points for every month of active participation with an Employee Care Manager. Remember that you can access your After Visit Summary by logging into your Tabula account. If you do not have a Tabula account, I can help you request access. Our program is designed to provide you with the opportunity to have a [facility] care manager partner with you for your healthcare needs. Please contact me at the below number if I can provide you with assistance for any of the above services.  
 
 
Sincerely, 
 
 
 Marcell Reyes, RN, BSN  Employee Care Manager [phone] [email or web address]

## 2018-07-12 ENCOUNTER — OFFICE VISIT (OUTPATIENT)
Dept: INTERNAL MEDICINE CLINIC | Age: 63
End: 2018-07-12

## 2018-07-12 VITALS
TEMPERATURE: 96.3 F | HEART RATE: 70 BPM | HEIGHT: 59 IN | BODY MASS INDEX: 17.98 KG/M2 | WEIGHT: 89.2 LBS | RESPIRATION RATE: 18 BRPM | OXYGEN SATURATION: 99 % | SYSTOLIC BLOOD PRESSURE: 159 MMHG | DIASTOLIC BLOOD PRESSURE: 73 MMHG

## 2018-07-12 DIAGNOSIS — Z13.228 SCREENING FOR ENDOCRINE, NUTRITIONAL, METABOLIC AND IMMUNITY DISORDER: ICD-10-CM

## 2018-07-12 DIAGNOSIS — Z13.21 SCREENING FOR ENDOCRINE, NUTRITIONAL, METABOLIC AND IMMUNITY DISORDER: ICD-10-CM

## 2018-07-12 DIAGNOSIS — Z13.29 SCREENING FOR ENDOCRINE, NUTRITIONAL, METABOLIC AND IMMUNITY DISORDER: ICD-10-CM

## 2018-07-12 DIAGNOSIS — Z13.220 SCREENING FOR LIPID DISORDERS: ICD-10-CM

## 2018-07-12 DIAGNOSIS — I10 ESSENTIAL HYPERTENSION: Primary | ICD-10-CM

## 2018-07-12 DIAGNOSIS — F17.210 CIGARETTE NICOTINE DEPENDENCE WITHOUT COMPLICATION: ICD-10-CM

## 2018-07-12 DIAGNOSIS — Z12.39 SCREENING FOR MALIGNANT NEOPLASM OF BREAST: ICD-10-CM

## 2018-07-12 DIAGNOSIS — Z13.0 SCREENING FOR ENDOCRINE, NUTRITIONAL, METABOLIC AND IMMUNITY DISORDER: ICD-10-CM

## 2018-07-12 RX ORDER — AMLODIPINE BESYLATE 10 MG/1
10 TABLET ORAL DAILY
Qty: 90 TAB | Refills: 3 | Status: SHIPPED | OUTPATIENT
Start: 2018-07-12 | End: 2018-07-12 | Stop reason: SDUPTHER

## 2018-07-12 RX ORDER — AMLODIPINE BESYLATE 10 MG/1
10 TABLET ORAL DAILY
Qty: 90 TAB | Refills: 3 | Status: SHIPPED | OUTPATIENT
Start: 2018-07-12 | End: 2018-10-04 | Stop reason: ALTCHOICE

## 2018-07-12 RX ORDER — HYDROCHLOROTHIAZIDE 25 MG/1
25 TABLET ORAL DAILY
Qty: 90 TAB | Refills: 3 | Status: SHIPPED | OUTPATIENT
Start: 2018-07-12 | End: 2018-10-04 | Stop reason: SINTOL

## 2018-07-12 RX ORDER — HYDROCHLOROTHIAZIDE 25 MG/1
25 TABLET ORAL DAILY
Qty: 90 TAB | Refills: 3 | Status: SHIPPED | OUTPATIENT
Start: 2018-07-12 | End: 2018-07-12 | Stop reason: SDUPTHER

## 2018-07-12 NOTE — MR AVS SNAPSHOT
[address] [place] [address] [id number] 
[phone] Patient: Delmy Hernandez MRN:  [id number] Visit Information Date & Time Provider Department Dept. Phone Encounter #  
 2018  7:45 AM Damion Woo NP 0592 [facility] [phone] [id number] Follow-up Instructions Return in about 12 weeks (around 10/4/2018) for HTN. BP check in 1 week. Upcoming Health Maintenance Date Due ZOSTER VACCINE AGE 60> 10/6/2016 BREAST CANCER SCRN MAMMOGRAM 2017 Pneumococcal 19-64 Highest Risk (2 of 3 - PCV13) 2017 Influenza Age 5 to Adult 2018 COLONOSCOPY 10/5/2021 DTaP/Tdap/Td series (2 - Td) 2026 Allergies as of 2018  Review Complete On: 2018 By: Kiana Ayon LPN Severity Noted Reaction Type Reactions Lisinopril Medium 2011    Cough Other reaction(s): Adverse reaction to substance Lipitor [Atorvastatin]  2017   Side Effect Vertigo Current Immunizations  Never Reviewed Name Date Influenza Vaccine 11/10/2015 Pneumococcal Polysaccharide (PPSV-23) 2016 11:17 AM  
 Td, Adsorbed PF 3/19/2015 Tdap 2016  9:08 AM  
  
 Not reviewed this visit You Were Diagnosed With   
  
 Codes Comments Essential hypertension    -  Primary ICD-10-CM: I10 
ICD-9-CM: 401.9 Screening for endocrine, nutritional, metabolic and immunity disorder     ICD-10-CM: Z13.29, Z13.21, Z13.228, Z13.0 ICD-9-CM: V77.99 Screening for lipid disorders     ICD-10-CM: Z13.220 ICD-9-CM: V77.91 Vitals BP Pulse Temp Resp Height(growth percentile) Weight(growth percentile) 159/73 (BP 1 Location: Right arm, BP Patient Position: Sitting) 70 96.3 °F (35.7 °C) (Oral) 18 4' 11\" (1.499 m) 89 lb 3.2 oz (40.5 kg) SpO2 BMI OB Status Smoking Status 99% 18.02 kg/m2 Hysterectomy Current Every Day Smoker Vitals History BMI and BSA Data Body Mass Index Body Surface Area 18.02 kg/m 2 1.3 m 2 Preferred Pharmacy Pharmacy Name Phone [facility]ACY #[id number], [address] [phone] Your Updated Medication List  
  
   
This list is accurate as of 7/12/18  8:48 AM.  Always use your most recent med list. amLODIPine 10 mg tablet Commonly known as:  Francisca Blade Take 1 Tab by mouth daily. hydroCHLOROthiazide 25 mg tablet Commonly known as:  HYDRODIURIL Take 1 Tab by mouth daily. Prescriptions Sent to Pharmacy Refills  
 hydroCHLOROthiazide (HYDRODIURIL) 25 mg tablet 3 Sig: Take 1 Tab by mouth daily. Class: Normal  
 Pharmacy: [facility]/pharmacy #[id number]- [address] Ph #: [phone] Route: Oral  
 amLODIPine (NORVASC) 10 mg tablet 3 Sig: Take 1 Tab by mouth daily. Class: Normal  
 Pharmacy: [facility]/pharmacy #[id number]- [address] Ph #: [phone] Route: Oral  
  
We Performed the Following CBC WITH AUTOMATED DIFF [75107 CPT(R)] LIPID PANEL [21003 CPT(R)] METABOLIC PANEL, COMPREHENSIVE [74709 CPT(R)] TSH 3RD GENERATION [86403 CPT(R)] Follow-up Instructions Return in about 12 weeks (around 10/4/2018) for HTN. BP check in 1 week. Patient Instructions Healthy food choices. Healthy snacks: 
Fruit- 1/2 cup per serving Vegetables-1 cup per serving Sugar-Free or Low-Carb branded snacks Lean protein- chicken, turkey, fish, deer, organic-fat free beef (in moderation) Jerky-beef, chicken, or turkey Oatmeal 
 
Drink mostly water, aiming for 1 gallon a day, but at least 10 glasses/day. May add lemon, lime, cucumber, or citrus fruit to water to help with digestion. How to Read a Food Label to Limit Sodium: Care Instructions Your Care Instructions Sodium causes your body to hold on to extra water. This can raise your blood pressure and force your heart and kidneys to work harder. In very serious cases, this could cause you to be put in the hospital. It might even be life-threatening. By limiting sodium, you will feel better and lower your risk of serious problems. Processed foods, fast food, and restaurant foods are the major sources of dietary sodium. The most common name for sodium is salt. Try to limit how much sodium you eat to less than 2,300 milligrams (mg) a day. If you limit your sodium to 1,500 mg a day, you can lower your blood pressure even more. This limit counts all the salt that you eat in foods you cook or in packaged foods. Keep a list of everything you eat and drink. Follow-up care is a key part of your treatment and safety. Be sure to make and go to all appointments, and call your doctor if you are having problems. It's also a good idea to know your test results and keep a list of the medicines you take. How can you care for yourself at home? Read ingredient lists on food labels · Read the list of ingredients on food labels to help you find how much sodium is in a food. The label lists the ingredients in a food in descending order (from the most to the least). If salt or sodium is high on the list, there may be a lot of sodium in the food. · Know that sodium has different names. Sodium is also called monosodium glutamate (MSG, common in [employer] [facility] food), sodium citrate, sodium alginate, sodium hydroxide, and sodium phosphate. Read Nutrition Facts labels · On most foods, there is a Nutrition Facts label. This will tell you how much sodium is in one serving of food. Look at both the serving size and the sodium amount. The serving size is located at the top of the label, usually right under the \"Nutrition Facts\" title. The amount of sodium is given in the list under the title. It is given in milligrams (mg). ¨ Check the serving size carefully. A single serving is often very small, and you may eat more than one serving. If this is the case, you will eat more sodium than listed on the label. For example, if the serving size for a canned soup is 1 cup and the sodium amount is 470 mg, if you have 2 cups you will eat 940 mg of sodium. · The nutrition facts for fresh fruits and vegetables are not listed on the food. They may be listed somewhere in the store. These foods usually have no sodium or low sodium. · The Nutrition Facts label also gives you the Percent Daily Value for sodium. This is how much of the recommended amount of sodium a serving contains. The daily value for sodium is less than 2,300 mg. So if the Percent Daily Value says 50%, this means one serving is giving you half of this, or 1,150 mg. Buy low-sodium foods · Look for foods that are made with less sodium. Watch for the following words on the label. ¨ \"Unsalted\" means there is no sodium added to the food. But there may be sodium already in the food naturally. ¨ \"Sodium-free\" means a serving has less than 5 mg of sodium. ¨ \"Very low sodium\" means a serving has 35 mg or less of sodium. ¨ \"Low-sodium\" means a serving has 140 mg or less of sodium. · \"Reduced-sodium\" means that there is 25% less sodium than what the food normally has. This is still usually too much sodium. Try not to buy foods with this on the label. · Buy fresh vegetables, or frozen vegetables without added sauces. Buy low-sodium versions of canned vegetables, soups, and other canned goods. Where can you learn more? Go to http://guerrero-callie.info/. Enter [phone] [id number] in the search box to learn more about \"How to Read a Food Label to Limit Sodium: Care Instructions. \" Current as of: May 12, 2017 Content Version: 11.7 © 8544-3232 [employer].  Care instructions adapted under license by [employer] (which disclaims liability or warranty for this information). If you have questions about a medical condition or this instruction, always ask your healthcare professional. [address] any warranty or liability for your use of this information. Introducing \[facility]\"" & [facility] SERVICES! Dear Henrry Lazo: Thank you for requesting a Takkle account. Our records indicate that you already have an active Takkle account. You can access your account anytime at [email or web address] Did you know that you can access your hospital and ER discharge instructions at any time in Takkle? You can also review all of your test results from your hospital stay or ER visit. Additional Information If you have questions, please visit the Frequently Asked Questions section of the Takk[employer] website at [email or web address] Remember, Takkle is NOT to be used for urgent needs. For medical emergencies, dial 911. Now available from your iPhone and Android! Please provide this summary of care documentation to your next provider. Your primary care clinician is listed as FIDEL Guadalupe. If you have any questions after today's visit, please call [phone].

## 2018-07-12 NOTE — PROGRESS NOTES
Pt is here for   Chief Complaint   Patient presents with    Hypertension     follow up     Pt denies pain at this time     1. Have you been to the ER, urgent care clinic since your last visit? Hospitalized since your last visit? No    2. Have you seen or consulted any other health care providers outside of the [address] [place] [address] since your last visit? Include any pap smears or colon screening.  No

## 2018-07-12 NOTE — PROGRESS NOTES
Haritha Silveira is a 65 y.o. female and presents with Hypertension (follow up)    Subjective:  Hypertension Review:  The patient has hypertension. Works often for 12 hours and sleeping an average of 6 hours  Diet and Lifestyle: generally does NOT try to follow a  low sodium diet, exercises sporadically   Home BP Monitoring: is not measured at home. Pertinent ROS: taking medications as instructed, no medication side effects noted. No TIA's, chest pain on exertion, dyspnea on exertion, or swelling of ankles. BP Readings from Last 3 Encounters:   07/12/18 159/73   11/07/17 128/69   11/02/17 171/81     Nicotine Dependence Review:  The patient currently is smoking 1/4  Ppd. she has smoked for multiple years. she has tried to quit in the past. Not interested in quitting at this time using medications. Seen for renal cancer with surgical removal 4 years ago. However having left abd discomfort intermittently. Exacerbated by sitting. Has clips in place and inquiriing about their ability to move out of place. Last imaging with Dr. Kuldip Hugo ~ 1 year ago. Pain Scale: 0 - No pain/10.     Review of Systems  Constitutional: negative for fevers, chills, anorexia and weight loss  Respiratory:  negative for cough, hemoptysis, dyspnea, and wheezing  CV:   negative for chest pain, palpitations, and lower extremity edema  GI:   negative for nausea, vomiting, diarrhea, abdominal pain, and melena  Endo:               negative for polyuria,polydipsia,polyphagia, and heat intolerance  Genitourinary: negative for frequency, urgency, dysuria, retention, and hematuria  Integument:  negative for rash, ulcerations, and pruritus  Hematologic:  negative for easy bruising and bleeding  Musculoskel: negative for arthralgias, muscle weakness,and joint pain/swelling  Neurological:  negative for headaches, dizziness, vertigo,and memory/gait problems  Behavl/Psych: negative for feelings of anxiety, depression, suicide, and mood changes    Past Medical History:   Diagnosis Date    Chronic leg pain 11/3/2010    fibromyalgia    COPD (chronic obstructive pulmonary disease) ([facility] [address] 9/18/2009    GERD (gastroesophageal reflux disease)     History of kidney cancer     Hypertension 9/18/2009    Leukopenia 9/18/2009    Other ill-defined conditions(799.89)     fibromyalgia    Vitamin D deficiency 7/7/2010     Past Surgical History:   Procedure Laterality Date    HX GYN      hysterectomy     Social History     Social History    Marital status: SINGLE     Spouse name: N/A    Number of children: N/A    Years of education: N/A     Social History Main Topics    Smoking status: Current Every Day Smoker     Packs/day: 0.50    Smokeless tobacco: Never Used    Alcohol use Yes      Comment: occasionally    Drug use: No    Sexual activity: Yes     Partners: Male     Birth control/ protection: Surgical     Other Topics Concern    None     Social History Narrative     Family History   Problem Relation Age of Onset    Diabetes Mother     Alcohol abuse Father     Cancer Sister      breast    Cancer Brother      breast    No Known Problems Sister     No Known Problems Brother      Current Outpatient Prescriptions   Medication Sig Dispense Refill    hydroCHLOROthiazide (HYDRODIURIL) 25 mg tablet Take 1 Tab by mouth daily. 90 Tab 3    amLODIPine (NORVASC) 10 mg tablet Take 1 Tab by mouth daily. 90 Tab 3     Allergies   Allergen Reactions    Lisinopril Cough     Other reaction(s):  Adverse reaction to substance     Lipitor [Atorvastatin] Vertigo       Objective:  Visit Vitals    /73 (BP 1 Location: Right arm, BP Patient Position: Sitting)    Pulse 70    Temp 96.3 °F (35.7 °C) (Oral)    Resp 18    Ht 4' 11\" (1.499 m)    Wt 89 lb 3.2 oz (40.5 kg)    SpO2 99%    BMI 18.02 kg/m2     Wt Readings from Last 3 Encounters:   07/12/18 89 lb 3.2 oz (40.5 kg)   11/07/17 88 lb 12.8 oz (40.3 kg)   11/02/17 88 lb 6.5 oz (40.1 kg)     Physical Exam:   General appearance - alert, well appearing, and in no distress. Very slender. Mental status - A/O x 4, normal mood and affect. Neck -Supple ,normal CSP. FROM, non-tender. No significant adenopathy/thyromegaly. No JVD. Chest - CTA. Symmetric chest rise. No wheezing, rales or rhonchi. Heart - Normal rate, regular rhythm. Normal S1, S2. No MGR or clicks. Abdomen - Soft,non-distended. Normoactive BS in all quadrants. NT, no mass or HSM. Ext- Radial, DP pulses, 2+ bilaterally. No pedal edema, clubbing, or cyanosis. Skin-Warm and dry. No hyperpigmentation, ulcerations, or suspicious lesions. Neuro - Normal speech, no focal findings or movement disorder. Normal strength, gait, and muscle tone. Assessment/Plan:  Labs ordered. Advised to have MAMMO done. Smoking cessation discussed for 3-10 minutes, pt planning to continue lowering daily use, no meds today. Discussed the patient's BMI with her. The BMI follow up plan is as follows:     weight gain advised  nutrition/feeding management  dietary management education, guidance, and counseling    An After Visit Summary was printed and given to the patient. Medication Side Effects and Warnings were discussed with patient: yes   Patient Labs were reviewed: yes  Patient Past Records were reviewed: yes    See below for other orders   Follow-up Disposition:  Return in about 12 weeks (around 10/4/2018) for HTN. BP check in 1 week. ICD-10-CM ICD-9-CM    1. Essential hypertension I10 401.9 hydroCHLOROthiazide (HYDRODIURIL) 25 mg tablet      amLODIPine (NORVASC) 10 mg tablet      LIPID PANEL      METABOLIC PANEL, COMPREHENSIVE      TSH 3RD GENERATION      DISCONTINUED: hydroCHLOROthiazide (HYDRODIURIL) 25 mg tablet      DISCONTINUED: amLODIPine (NORVASC) 10 mg tablet   2.  Screening for endocrine, nutritional, metabolic and immunity disorder P93.58 I53.47 METABOLIC PANEL, COMPREHENSIVE    Z13.21  CBC WITH AUTOMATED DIFF    Z13.228  TSH 3RD GENERATION Z13.0     3. Screening for lipid disorders Z13.220 V77.91 LIPID PANEL   4. Cigarette nicotine dependence without complication N36.216 810.1    5. Screening for malignant neoplasm of breast Z12.31 V76.10 BRANDI MAMMO BI SCREENING INCL CAD     Orders Placed This Encounter    BRANDI MAMMO BI SCREENING INCL CAD     Standing Status:   Future     Standing Expiration Date:   8/13/2019     Order Specific Question:   Reason for Exam     Answer:   breast cancer screening    LIPID PANEL    METABOLIC PANEL, COMPREHENSIVE    CBC WITH AUTOMATED DIFF    TSH 3RD GENERATION    DISCONTD: hydroCHLOROthiazide (HYDRODIURIL) 25 mg tablet     Sig: Take 1 Tab by mouth daily. Dispense:  90 Tab     Refill:  3    DISCONTD: amLODIPine (NORVASC) 10 mg tablet     Sig: Take 1 Tab by mouth daily. Dispense:  90 Tab     Refill:  3    hydroCHLOROthiazide (HYDRODIURIL) 25 mg tablet     Sig: Take 1 Tab by mouth daily. Dispense:  90 Tab     Refill:  3    amLODIPine (NORVASC) 10 mg tablet     Sig: Take 1 Tab by mouth daily. Dispense:  90 Tab     Refill:  [id number] expressed understanding of plan. An After Visit Summary was offered/printed and given to the patient.

## 2018-07-12 NOTE — PATIENT INSTRUCTIONS
Healthy food choices. Healthy snacks:  Fruit- 1/2 cup per serving  Vegetables-1 cup per serving  Sugar-Free or Low-Carb branded snacks  Lean protein- chicken, turkey, fish, deer, organic-fat free beef (in moderation)  Jerky-beef, chicken, or turkey  Oatmeal    Drink mostly water, aiming for 1 gallon a day, but at least 10 glasses/day. May add lemon, lime, cucumber, or citrus fruit to water to help with digestion. How to Read a Food Label to Limit Sodium: Care Instructions  Your Care Instructions  Sodium causes your body to hold on to extra water. This can raise your blood pressure and force your heart and kidneys to work harder. In very serious cases, this could cause you to be put in the hospital. It might even be life-threatening. By limiting sodium, you will feel better and lower your risk of serious problems. Processed foods, fast food, and restaurant foods are the major sources of dietary sodium. The most common name for sodium is salt. Try to limit how much sodium you eat to less than 2,300 milligrams (mg) a day. If you limit your sodium to 1,500 mg a day, you can lower your blood pressure even more. This limit counts all the salt that you eat in foods you cook or in packaged foods. Keep a list of everything you eat and drink. Follow-up care is a key part of your treatment and safety. Be sure to make and go to all appointments, and call your doctor if you are having problems. It's also a good idea to know your test results and keep a list of the medicines you take. How can you care for yourself at home? Read ingredient lists on food labels  · Read the list of ingredients on food labels to help you find how much sodium is in a food. The label lists the ingredients in a food in descending order (from the most to the least). If salt or sodium is high on the list, there may be a lot of sodium in the food. · Know that sodium has different names.  Sodium is also called monosodium glutamate (MSG, common in [employer]), sodium citrate, sodium alginate, sodium hydroxide, and sodium phosphate. Read Nutrition Facts labels  · On most foods, there is a Nutrition Facts label. This will tell you how much sodium is in one serving of food. Look at both the serving size and the sodium amount. The serving size is located at the top of the label, usually right under the \"Nutrition Facts\" title. The amount of sodium is given in the list under the title. It is given in milligrams (mg). ¨ Check the serving size carefully. A single serving is often very small, and you may eat more than one serving. If this is the case, you will eat more sodium than listed on the label. For example, if the serving size for a canned soup is 1 cup and the sodium amount is 470 mg, if you have 2 cups you will eat 940 mg of sodium. · The nutrition facts for fresh fruits and vegetables are not listed on the food. They may be listed somewhere in the store. These foods usually have no sodium or low sodium. · The Nutrition Facts label also gives you the Percent Daily Value for sodium. This is how much of the recommended amount of sodium a serving contains. The daily value for sodium is less than 2,300 mg. So if the Percent Daily Value says 50%, this means one serving is giving you half of this, or 1,150 mg. Buy low-sodium foods  · Look for foods that are made with less sodium. Watch for the following words on the label. ¨ \"Unsalted\" means there is no sodium added to the food. But there may be sodium already in the food naturally. ¨ \"Sodium-free\" means a serving has less than 5 mg of sodium. ¨ \"Very low sodium\" means a serving has 35 mg or less of sodium. ¨ \"Low-sodium\" means a serving has 140 mg or less of sodium. · \"Reduced-sodium\" means that there is 25% less sodium than what the food normally has. This is still usually too much sodium. Try not to buy foods with this on the label.   · Buy fresh vegetables, or frozen vegetables without added sauces. Buy low-sodium versions of canned vegetables, soups, and other canned goods. Where can you learn more? Go to http://guerrero-callie.info/. Enter 26 [id number] in the search box to learn more about \"How to Read a Food Label to Limit Sodium: Care Instructions. \"  Current as of: May 12, 2017  Content Version: 11.7  © 5900-5813 [employer]. Care instructions adapted under license by [employer] (which disclaims liability or warranty for this information). If you have questions about a medical condition or this instruction, always ask your healthcare professional. [place] [address] any warranty or liability for your use of this information. Body Mass Index: Care Instructions  Your Care Instructions    Body mass index (BMI) can help you see if your weight is raising your risk for health problems. It uses a formula to compare how much you weigh with how tall you are. · A BMI lower than 18.5 is considered underweight. · A BMI between 18.5 and 24.9 is considered healthy. · A BMI between 25 and 29.9 is considered overweight. A BMI of 30 or higher is considered obese. If your BMI is in the normal range, it means that you have a lower risk for weight-related health problems. If your BMI is in the overweight or obese range, you may be at increased risk for weight-related health problems, such as high blood pressure, heart disease, stroke, arthritis or joint pain, and diabetes. If your BMI is in the underweight range, you may be at increased risk for health problems such as fatigue, lower protection (immunity) against illness, muscle loss, bone loss, hair loss, and hormone problems. BMI is just one measure of your risk for weight-related health problems. You may be at higher risk for health problems if you are not active, you eat an unhealthy diet, or you drink too much alcohol or use tobacco products.   Follow-up care is a key part of your treatment and safety. Be sure to make and go to all appointments, and call your doctor if you are having problems. It's also a good idea to know your test results and keep a list of the medicines you take. How can you care for yourself at home? · Practice healthy eating habits. This includes eating plenty of fruits, vegetables, whole grains, lean protein, and low-fat dairy. · If your doctor recommends it, get more exercise. Walking is a good choice. Bit by bit, increase the amount you walk every day. Try for at least 30 minutes on most days of the week. · Do not smoke. Smoking can increase your risk for health problems. If you need help quitting, talk to your doctor about stop-smoking programs and medicines. These can increase your chances of quitting for good. · Limit alcohol to 2 drinks a day for men and 1 drink a day for women. Too much alcohol can cause health problems. If you have a BMI higher than 25  · Your doctor may do other tests to check your risk for weight-related health problems. This may include measuring the distance around your waist. A waist measurement of more than 40 inches in men or 35 inches in women can increase the risk of weight-related health problems. · Talk with your doctor about steps you can take to stay healthy or improve your health. You may need to make lifestyle changes to lose weight and stay healthy, such as changing your diet and getting regular exercise. If you have a BMI lower than 18.5  · Your doctor may do other tests to check your risk for health problems. · Talk with your doctor about steps you can take to stay healthy or improve your health. You may need to make lifestyle changes to gain or maintain weight and stay healthy, such as getting more healthy foods in your diet and doing exercises to build muscle. Where can you learn more? Go to http://guerrero-callie.info/.   Enter S176 in the search box to learn more about \"Body Mass Index: Care Instructions. \"  Current as of: October 13, 2016  Content Version: 11.4  © 2678-5781 Healthwise, [facility]. Care instructions adapted under license by [employer] (which disclaims liability or warranty for this information). If you have questions about a medical condition or this instruction, always ask your healthcare professional. [place] [address] any warranty or liability for your use of this information.

## 2018-07-13 LAB
ALBUMIN SERPL-MCNC: 4.7 G/DL (ref 3.6–4.8)
ALBUMIN/GLOB SERPL: 1.9 {RATIO} (ref 1.2–2.2)
ALP SERPL-CCNC: 62 IU/L (ref 39–117)
ALT SERPL-CCNC: 14 IU/L (ref 0–32)
AST SERPL-CCNC: 21 IU/L (ref 0–40)
BASOPHILS # BLD AUTO: 0 X10E3/UL (ref 0–0.2)
BASOPHILS NFR BLD AUTO: 1 %
BILIRUB SERPL-MCNC: 0.5 MG/DL (ref 0–1.2)
BUN SERPL-MCNC: 13 MG/DL (ref 8–27)
BUN/CREAT SERPL: 15 (ref 12–28)
CALCIUM SERPL-MCNC: 10 MG/DL (ref 8.7–10.3)
CHLORIDE SERPL-SCNC: 99 MMOL/L (ref 96–106)
CHOLEST SERPL-MCNC: 189 MG/DL (ref 100–199)
CO2 SERPL-SCNC: 28 MMOL/L (ref 20–29)
CREAT SERPL-MCNC: 0.84 MG/DL (ref 0.57–1)
EOSINOPHIL # BLD AUTO: 0 X10E3/UL (ref 0–0.4)
EOSINOPHIL NFR BLD AUTO: 1 %
ERYTHROCYTE [DISTWIDTH] IN BLOOD BY AUTOMATED COUNT: 15.5 % (ref 12.3–15.4)
GLOBULIN SER CALC-MCNC: 2.5 G/DL (ref 1.5–4.5)
GLUCOSE SERPL-MCNC: 81 MG/DL (ref 65–99)
HCT VFR BLD AUTO: 40.8 % (ref 34–46.6)
HDLC SERPL-MCNC: 67 MG/DL
HGB BLD-MCNC: 13.3 G/DL (ref 11.1–15.9)
IMM GRANULOCYTES # BLD: 0 X10E3/UL (ref 0–0.1)
IMM GRANULOCYTES NFR BLD: 0 %
INTERPRETATION, 910389: NORMAL
LDLC SERPL CALC-MCNC: 105 MG/DL (ref 0–99)
LYMPHOCYTES # BLD AUTO: 1.4 X10E3/UL (ref 0.7–3.1)
LYMPHOCYTES NFR BLD AUTO: 23 %
MCH RBC QN AUTO: 25.2 PG (ref 26.6–33)
MCHC RBC AUTO-ENTMCNC: 32.6 G/DL (ref 31.5–35.7)
MCV RBC AUTO: 77 FL (ref 79–97)
MONOCYTES # BLD AUTO: 0.5 X10E3/UL (ref 0.1–0.9)
MONOCYTES NFR BLD AUTO: 8 %
NEUTROPHILS # BLD AUTO: 4.1 X10E3/UL (ref 1.4–7)
NEUTROPHILS NFR BLD AUTO: 67 %
PLATELET # BLD AUTO: 336 X10E3/UL (ref 150–379)
POTASSIUM SERPL-SCNC: 4.4 MMOL/L (ref 3.5–5.2)
PROT SERPL-MCNC: 7.2 G/DL (ref 6–8.5)
RBC # BLD AUTO: 5.28 X10E6/UL (ref 3.77–5.28)
SODIUM SERPL-SCNC: 142 MMOL/L (ref 134–144)
TRIGL SERPL-MCNC: 83 MG/DL (ref 0–149)
TSH SERPL DL<=0.005 MIU/L-ACNC: 1.12 UIU/ML (ref 0.45–4.5)
VLDLC SERPL CALC-MCNC: 17 MG/DL (ref 5–40)
WBC # BLD AUTO: 6.1 X10E3/UL (ref 3.4–10.8)

## 2018-07-19 ENCOUNTER — CLINICAL SUPPORT (OUTPATIENT)
Dept: INTERNAL MEDICINE CLINIC | Age: 63
End: 2018-07-19

## 2018-07-19 ENCOUNTER — HOSPITAL ENCOUNTER (OUTPATIENT)
Dept: MAMMOGRAPHY | Age: 63
Discharge: HOME | End: 2018-07-19
Attending: NURSE PRACTITIONER
Payer: COMMERCIAL

## 2018-07-19 ENCOUNTER — OFFICE VISIT (OUTPATIENT)
Dept: PRIMARY CARE CLINIC | Age: 63
End: 2018-07-19

## 2018-07-19 VITALS
HEART RATE: 75 BPM | HEIGHT: 59 IN | TEMPERATURE: 98.2 F | OXYGEN SATURATION: 98 % | WEIGHT: 88.6 LBS | RESPIRATION RATE: 18 BRPM | SYSTOLIC BLOOD PRESSURE: 119 MMHG | DIASTOLIC BLOOD PRESSURE: 67 MMHG | BODY MASS INDEX: 17.86 KG/M2

## 2018-07-19 VITALS
OXYGEN SATURATION: 96 % | SYSTOLIC BLOOD PRESSURE: 128 MMHG | HEART RATE: 66 BPM | RESPIRATION RATE: 18 BRPM | DIASTOLIC BLOOD PRESSURE: 62 MMHG | WEIGHT: 89.4 LBS | TEMPERATURE: 98.3 F | HEIGHT: 59 IN | BODY MASS INDEX: 18.02 KG/M2

## 2018-07-19 DIAGNOSIS — I10 ESSENTIAL HYPERTENSION: Primary | ICD-10-CM

## 2018-07-19 DIAGNOSIS — Z12.39 SCREENING FOR MALIGNANT NEOPLASM OF BREAST: ICD-10-CM

## 2018-07-19 DIAGNOSIS — J34.89 NASAL VESTIBULITIS: Primary | ICD-10-CM

## 2018-07-19 PROCEDURE — 77067 SCR MAMMO BI INCL CAD: CPT

## 2018-07-19 RX ORDER — MUPIROCIN 20 MG/G
OINTMENT TOPICAL
Qty: 15 G | Refills: 0 | Status: SHIPPED | OUTPATIENT
Start: 2018-07-19 | End: 2019-06-18 | Stop reason: ALTCHOICE

## 2018-07-19 NOTE — PROGRESS NOTES
Pt was instructed to take medication as prescribed. No medication change this visit. BP Readings from Last 3 Encounters:   07/19/18 128/62   07/12/18 159/73   11/07/17 128/69     Pt here for BP check.

## 2018-07-19 NOTE — MR AVS SNAPSHOT
[address] [facility]. 
[phone] Patient: Johnson Camarena MRN: [id number] :1955 Visit Information Date & Time Provider Department Dept. Phone Encounter #  
 2018  3:45 PM Dario Soares Iris [id number] Your Appointments 10/4/2018  7:45 AM  
ROUTINE CARE with Nathan Pisano NP  
[facility] Appt Note: htn  
 [address] [id number]  
[phone]  
  
   
 [address] [facility] Upcoming Health Maintenance Date Due ZOSTER VACCINE AGE 60> 10/6/2016 BREAST CANCER SCRN MAMMOGRAM 2017 Pneumococcal 19-64 Highest Risk (2 of 3 - PCV13) 2017 Influenza Age 5 to Adult 2018 COLONOSCOPY 10/5/2021 DTaP/Tdap/Td series (2 - Td) 2026 Allergies as of 2018  Review Complete On: 2018 By: Kayla Jackson MD  
  
 Severity Noted Reaction Type Reactions Lisinopril Medium 2011    Cough Other reaction(s): Adverse reaction to substance Lipitor [Atorvastatin]  2017   Side Effect Vertigo Current Immunizations  Never Reviewed Name Date Influenza Vaccine 11/10/2015 Pneumococcal Polysaccharide (PPSV-23) 2016 11:17 AM  
 Td, Adsorbed PF 3/19/2015 Tdap 2016  9:08 AM  
  
 Not reviewed this visit You Were Diagnosed With   
  
 Codes Comments Nasal vestibulitis    -  Primary ICD-10-CM: G80.01 ICD-9-CM: 478.19 Vitals BP Pulse Temp Resp Height(growth percentile) Weight(growth percentile)  
 119/67 (BP 1 Location: Left arm, BP Patient Position: Sitting) 75 98.2 °F (36.8 °C) (Oral) 18 4' 11\" (1.499 m) 88 lb 9.6 oz (40.2 kg) SpO2 BMI OB Status Smoking Status 98% 17.9 kg/m2 Hysterectomy Current Every Day Smoker BMI and BSA Data Body Mass Index Body Surface Area 17.9 kg/m 2 1.29 m 2 Preferred Pharmacy Pharmacy Name Phone [id number]- [place], [facility] [phone] Your Updated Medication List  
  
   
This list is accurate as of 7/19/18  3:55 PM.  Always use your most recent med list. amLODIPine 10 mg tablet Commonly known as:  Arcos Mullet Take 1 Tab by mouth daily. hydroCHLOROthiazide 25 mg tablet Commonly known as:  HYDRODIURIL Take 1 Tab by mouth daily. mupirocin 2 % ointment Commonly known as:  [facility] Apply to right nostril twice daily x5 days Prescriptions Sent to Pharmacy Refills  
 mupirocin (BACTROBAN) 2 % ointment 0 Sig: Apply to right nostril twice daily x5 days Class: Normal  
 Pharmacy: [facility]acy #[id number]- [address]  #: [phone] Patient Instructions Folliculitis: Care Instructions Your Care Instructions Folliculitis (say \"sdu-RQE-ixu-LY-tus\") is an infection of the pouches (follicles) in the skin where hair grows. It can occur on any part of the body, but it is most common on the scalp, face, armpits, and groin. Bacteria, such as those found in a hot tub, can cause folliculitis. Folliculitis begins as a red, tender area near a strand of hair. The skin can itch or burn and may drain pus or blood. Sometimes folliculitis can lead to more serious skin infections. Your doctor usually can treat mild folliculitis with an antibiotic cream or ointment. If you have folliculitis on your scalp, you may use a shampoo that kills bacteria. Antibiotics you take as pills can treat infections deeper in the skin. For stubborn cases of folliculitis, laser treatment may be an option. Laser treatment uses strong beams of light to destroy the hair follicle. But hair will no longer grow in the treated area. Follow-up care is a key part of your treatment and safety. Be sure to make and go to all appointments, and call your doctor if you are having problems. It's also a good idea to know your test results and keep a list of the medicines you take. How can you care for yourself at home? · Take your medicine exactly as prescribed. If your doctor prescribed antibiotics, take them as directed. Do not stop taking them just because you feel better. You need to take the full course of antibiotics. · Use a soap that kills bacteria to wash the infected area. If your scalp or beard is infected, use a shampoo with selenium or propylene glycol. Be careful. Do not scrub too long or too hard. · Mix 1 1/3 cup warm water and 1 tablespoon vinegar. Soak a cloth in the mixture, and place it over the infected skin until it cools off (usually 5 to 10 minutes). You can do this 3 to 6 times a day. · Do not share your razor, towel, or washcloth. That can spread folliculitis. · Use a new blade in your razor each time you shave to keep from re-infecting your skin. · If you tend to get folliculitis, avoid using hot tubs. They can contain bacteria that cause folliculitis. When should you call for help? Call your doctor now or seek immediate medical care if: 
  · You have symptoms of infection, such as: 
¨ Increased pain, swelling, warmth, or redness. ¨ Red streaks leading from the area. ¨ Pus draining from the area. ¨ A fever.  
 Watch closely for changes in your health, and be sure to contact your doctor if: 
  · You do not get better as expected. Where can you learn more? Go to http://guerrero-callie.info/. Enter M257 in the search box to learn more about \"Folliculitis: Care Instructions. \" Current as of: October 5, 2017 Content Version: 11.7 © 1704-1393 [employer].  Care instructions adapted under license by [employer] (which disclaims liability or warranty for this information). If you have questions about a medical condition or this instruction, always ask your healthcare professional. [address] any warranty or liability for your use of this information. Introducing [facility] & [facility] SERVICES! Dear Beau Bush: Thank you for requesting a [employer] account. Our records indicate that you already have an active [employer] account. You can access your account anytime at [email or web address] Did you know that you can access your hospital and ER discharge instructions at any time in [employer]? You can also review all of your test results from your hospital stay or ER visit. Additional Information If you have questions, please visit the Frequently Asked Questions section of the [employer] website at [email or web address] Remember, [employer] is NOT to be used for urgent needs. For medical emergencies, dial 911. Now available from your iPhone and Android! Please provide this summary of care documentation to your next provider. Your primary care clinician is listed as FIDEL Lam. If you have any questions after today's visit, please call [phone].

## 2018-07-19 NOTE — PROGRESS NOTES
Chief Complaint   Patient presents with    Rash   pt c/o ingrown hair on the inside of her R nostril, pt states there is a bump on the inside of her R nostril x 1 week, pt states she has put neosporin on nose to help with discomfort. This note will not be viewable in [address]

## 2018-07-19 NOTE — PATIENT INSTRUCTIONS
Folliculitis: Care Instructions Your Care Instructions Folliculitis (say \"qtw-YTD-vbu-LY-tus\") is an infection of the pouches (follicles) in the skin where hair grows. It can occur on any part of the body, but it is most common on the scalp, face, armpits, and groin. Bacteria, such as those found in a hot tub, can cause folliculitis. Folliculitis begins as a red, tender area near a strand of hair. The skin can itch or burn and may drain pus or blood. Sometimes folliculitis can lead to more serious skin infections. Your doctor usually can treat mild folliculitis with an antibiotic cream or ointment. If you have folliculitis on your scalp, you may use a shampoo that kills bacteria. Antibiotics you take as pills can treat infections deeper in the skin. For stubborn cases of folliculitis, laser treatment may be an option. Laser treatment uses strong beams of light to destroy the hair follicle. But hair will no longer grow in the treated area. Follow-up care is a key part of your treatment and safety. Be sure to make and go to all appointments, and call your doctor if you are having problems. It's also a good idea to know your test results and keep a list of the medicines you take. How can you care for yourself at home? · Take your medicine exactly as prescribed. If your doctor prescribed antibiotics, take them as directed. Do not stop taking them just because you feel better. You need to take the full course of antibiotics. · Use a soap that kills bacteria to wash the infected area. If your scalp or beard is infected, use a shampoo with selenium or propylene glycol. Be careful. Do not scrub too long or too hard. · Mix 1 1/3 cup warm water and 1 tablespoon vinegar. Soak a cloth in the mixture, and place it over the infected skin until it cools off (usually 5 to 10 minutes). You can do this 3 to 6 times a day. · Do not share your razor, towel, or washcloth. That can spread folliculitis.  
· Use a new blade in your razor each time you shave to keep from re-infecting your skin. · If you tend to get folliculitis, avoid using hot tubs. They can contain bacteria that cause folliculitis. When should you call for help? Call your doctor now or seek immediate medical care if: 
  · You have symptoms of infection, such as: 
¨ Increased pain, swelling, warmth, or redness. ¨ Red streaks leading from the area. ¨ Pus draining from the area. ¨ A fever.  
 Watch closely for changes in your health, and be sure to contact your doctor if: 
  · You do not get better as expected. Where can you learn more? Go to http://guerrero-callie.info/. Enter M257 in the search box to learn more about \"Folliculitis: Care Instructions. \" Current as of: October 5, 2017 Content Version: 11.7 © 1440-5941 [employer]. Care instructions adapted under license by [employer] (which disclaims liability or warranty for this information). If you have questions about a medical condition or this instruction, always ask your healthcare professional. [address] any warranty or liability for your use of this information.

## 2018-07-19 NOTE — PROGRESS NOTES
HISTORY OF PRESENT ILLNESS  Carol Hill is a 65 y.o. female. HPI  Presents for right nostril lump. Noticed 2 weeks ago, painful, no drainage, felt like it/s growing in size slightly. No nasal congestion or bleeding. She has tried no meds but tried applying vaseline which didn't help    Review of Systems   Constitutional: Negative for chills and fever. HENT: Negative for congestion, ear pain, nosebleeds and sore throat. Skin: Negative for rash. Past Medical History:   Diagnosis Date    Chronic leg pain 11/3/2010    fibromyalgia    COPD (chronic obstructive pulmonary disease) ([facility] [address] 9/18/2009    GERD (gastroesophageal reflux disease)     History of kidney cancer     Hypertension 9/18/2009    Leukopenia 9/18/2009    Other ill-defined conditions(799.89)     fibromyalgia    Vitamin D deficiency 7/7/2010     Past Surgical History:   Procedure Laterality Date    HX GYN      hysterectomy    HX HYSTERECTOMY      HX OOPHORECTOMY       Social History     Social History    Marital status: SINGLE     Spouse name: N/A    Number of children: N/A    Years of education: N/A     Social History Main Topics    Smoking status: Current Every Day Smoker     Packs/day: 0.50    Smokeless tobacco: Never Used    Alcohol use Yes      Comment: occasionally    Drug use: No    Sexual activity: Yes     Partners: Male     Birth control/ protection: Surgical     Other Topics Concern    None     Social History Narrative     Family History   Problem Relation Age of Onset    Diabetes Mother     Alcohol abuse Father     Cancer Sister      breast    Breast Cancer Sister      onset; around 54    Cancer Brother      breast    Breast Cancer Brother      onset: around 36    No Known Problems Sister     No Known Problems Brother      Current Outpatient Prescriptions on File Prior to Visit   Medication Sig Dispense Refill    hydroCHLOROthiazide (HYDRODIURIL) 25 mg tablet Take 1 Tab by mouth daily.  90 Tab 3    amLODIPine (NORVASC) 10 mg tablet Take 1 Tab by mouth daily. 90 Tab 3     No current facility-administered medications on file prior to visit. Allergies   Allergen Reactions    Lisinopril Cough     Other reaction(s): Adverse reaction to substance     Lipitor [Atorvastatin] Vertigo       Physical Exam   Constitutional: She appears well-developed and well-nourished. No distress. /67 (BP 1 Location: Left arm, BP Patient Position: Sitting)  Pulse 75  Temp 98.2 °F (36.8 °C) (Oral)   Resp 18  Ht 4' 11\" (1.499 m)  Wt 88 lb 9.6 oz (40.2 kg)  SpO2 98%  BMI 17.9 kg/m2   HENT:   Head: Normocephalic and atraumatic. Right Ear: External ear normal.   Left Ear: External ear normal.   Nose: No septal deviation or nasal septal hematoma. Mouth/Throat: Oropharynx is clear and moist. No oropharyngeal exudate. Firm nodule ~0.5 cm above right nare inferior to septum no fluctuance noted slightly tender   Eyes: Conjunctivae and EOM are normal.   Skin: She is not diaphoretic. ASSESSMENT and PLAN    ICD-10-CM ICD-9-CM    1. Nasal vestibulitis J34.89 478.19 mupirocin (BACTROBAN) 2 % ointment     Frequent warm compresses, bactroban per orders. Return or see ENT if worsening or not improving. This note will not be viewable in [address]

## 2018-10-04 ENCOUNTER — OFFICE VISIT (OUTPATIENT)
Dept: INTERNAL MEDICINE CLINIC | Age: 63
End: 2018-10-04

## 2018-10-04 VITALS
HEART RATE: 74 BPM | TEMPERATURE: 98.2 F | SYSTOLIC BLOOD PRESSURE: 156 MMHG | HEIGHT: 59 IN | OXYGEN SATURATION: 98 % | DIASTOLIC BLOOD PRESSURE: 78 MMHG | RESPIRATION RATE: 18 BRPM | WEIGHT: 91 LBS | BODY MASS INDEX: 18.35 KG/M2

## 2018-10-04 DIAGNOSIS — I10 ESSENTIAL HYPERTENSION: Primary | ICD-10-CM

## 2018-10-04 DIAGNOSIS — Z23 ENCOUNTER FOR IMMUNIZATION: ICD-10-CM

## 2018-10-04 DIAGNOSIS — Z91.199 NON-ADHERENCE TO MEDICAL TREATMENT: ICD-10-CM

## 2018-10-04 DIAGNOSIS — F17.210 CIGARETTE NICOTINE DEPENDENCE WITHOUT COMPLICATION: ICD-10-CM

## 2018-10-04 RX ORDER — AMLODIPINE AND VALSARTAN 10; 160 MG/1; MG/1
1 TABLET ORAL DAILY
Qty: 90 TAB | Refills: 3 | Status: SHIPPED | OUTPATIENT
Start: 2018-10-04 | End: 2019-06-19 | Stop reason: SDUPTHER

## 2018-10-04 NOTE — PROGRESS NOTES
Perla Bailey is a 65 y.o. female and presents with Hypertension (follow up)    Subjective:  Hypertension Review:  The patient has hypertension. Works often for 12 hours and sleeping an average of 6 hours  Diet and Lifestyle: generally does NOT try to follow a  low sodium diet, exercises sporadically   Home BP Monitoring: is not measured at home. Pertinent ROS: taking medications as instructed, no medication side effects noted. No TIA's, chest pain on exertion, dyspnea on exertion, or swelling of ankles. BP Readings from Last 3 Encounters:   10/04/18 156/78   07/19/18 119/67   07/19/18 128/62     Nicotine Dependence Review:  The patient currently is smoking 1/4  Ppd. she has smoked for multiple years. she has tried to quit in the past. Not interested in quitting at this time using medications. Seen for renal cancer with surgical removal 4 years ago. However having left abd discomfort intermittently. Exacerbated by sitting. Has clips in place and inquiriing about their ability to move out of place. Last imaging with Dr. Idalia Durand ~ 1 year ago. Pain Scale: 0 - No pain/10.     Review of Systems  Constitutional: negative for fevers, chills, anorexia and weight loss  Respiratory:  negative for cough, hemoptysis, dyspnea, and wheezing  CV:   negative for chest pain, palpitations, and lower extremity edema  GI:   negative for nausea, vomiting, diarrhea, abdominal pain, and melena  Endo:               negative for polyuria,polydipsia,polyphagia, and heat intolerance  Genitourinary: negative for frequency, urgency, dysuria, retention, and hematuria  Integument:  negative for rash, ulcerations, and pruritus  Hematologic:  negative for easy bruising and bleeding  Musculoskel: negative for arthralgias, muscle weakness,and joint pain/swelling  Neurological:  negative for headaches, dizziness, vertigo,and memory/gait problems  Behavl/Psych: negative for feelings of anxiety, depression, suicide, and mood changes    Past Medical History:   Diagnosis Date    Chronic leg pain 11/3/2010    fibromyalgia    COPD (chronic obstructive pulmonary disease) ([facility] [address] 9/18/2009    GERD (gastroesophageal reflux disease)     History of kidney cancer     Hypertension 9/18/2009    Leukopenia 9/18/2009    Other ill-defined conditions(799.89)     fibromyalgia    Vitamin D deficiency 7/7/2010     Past Surgical History:   Procedure Laterality Date    HX GYN      hysterectomy    HX HYSTERECTOMY      HX OOPHORECTOMY       Social History     Social History    Marital status: SINGLE     Spouse name: N/A    Number of children: N/A    Years of education: N/A     Social History Main Topics    Smoking status: Current Every Day Smoker     Packs/day: 0.50    Smokeless tobacco: Never Used    Alcohol use Yes      Comment: occasionally    Drug use: No    Sexual activity: Yes     Partners: Male     Birth control/ protection: Surgical     Other Topics Concern    None     Social History Narrative     Family History   Problem Relation Age of Onset    Diabetes Mother     Alcohol abuse Father     Cancer Sister      breast    Breast Cancer Sister      onset; around 54    Cancer Brother      breast    Breast Cancer Brother      onset: around 36    No Known Problems Sister     No Known Problems Brother      Current Outpatient Prescriptions   Medication Sig Dispense Refill    mupirocin (BACTROBAN) 2 % ointment Apply to right nostril twice daily x5 days 15 g 0    hydroCHLOROthiazide (HYDRODIURIL) 25 mg tablet Take 1 Tab by mouth daily. 90 Tab 3    amLODIPine (NORVASC) 10 mg tablet Take 1 Tab by mouth daily. 90 Tab 3     Allergies   Allergen Reactions    Lisinopril Cough     Other reaction(s):  Adverse reaction to substance     Lipitor [Atorvastatin] Vertigo       Objective:  Visit Vitals    /78 (BP 1 Location: Right arm, BP Patient Position: Sitting)    Pulse 74    Temp 98.2 °F (36.8 °C) (Oral)    Resp 18    Ht 4' 11\" (1.499 m)    Wt 91 lb (41.3 kg)    SpO2 98%    BMI 18.38 kg/m2     Wt Readings from Last 3 Encounters:   10/04/18 91 lb (41.3 kg)   07/19/18 88 lb 9.6 oz (40.2 kg)   07/19/18 89 lb 6.4 oz (40.6 kg)     Physical Exam:   General appearance - alert, well appearing, and in no distress. Very slender. Mental status - A/O x 4, normal mood and affect. Neck -Supple ,normal CSP. FROM, non-tender. No significant adenopathy/thyromegaly. No JVD. Chest - CTA. Symmetric chest rise. No wheezing, rales or rhonchi. Heart - Normal rate, regular rhythm. Normal S1, S2. No MGR or clicks. Abdomen - Soft,non-distended. Normoactive BS in all quadrants. NT, no mass or HSM. Ext- Radial, DP pulses, 2+ bilaterally. No pedal edema, clubbing, or cyanosis. Skin-Warm and dry. No hyperpigmentation, ulcerations, or suspicious lesions. Neuro - Normal speech, no focal findings or movement disorder. Normal strength, gait, and muscle tone. Assessment/Plan:  Labs ordered. Advised to have MAMMO done. Smoking cessation discussed for 3-10 minutes, pt planning to continue lowering daily use, no meds today. Discussed the patient's BMI with her. The BMI follow up plan is as follows:     weight gain advised  nutrition/feeding management  dietary management education, guidance, and counseling    An After Visit Summary was printed and given to the patient. Medication Side Effects and Warnings were discussed with patient: yes   Patient Labs were reviewed: yes  Patient Past Records were reviewed: yes    See below for other orders   Follow-up Disposition: Not on File      No diagnosis found. No orders of the defined types were placed in this encounter. Vi Stokes expressed understanding of plan. An After Visit Summary was offered/printed and given to the patient.

## 2018-10-04 NOTE — PROGRESS NOTES
Kimberly Gupta is a 65 y.o. female and presents with Hypertension (follow up)    Subjective:  Hypertension Review:  The patient has hypertension. Works often for 12 hours and sleeping an average of 6 hours  Diet and Lifestyle: generally does try to follow a  low sodium diet, exercises sporadically   Home BP Monitoring: is not measured at home. Pertinent ROS: taking medications as instructed SOMETIMES ~ 3 x weekly, dislikes excessive urination with diuretic. No TIA's, chest pain on exertion, dyspnea on exertion, or swelling of ankles. BP Readings from Last 3 Encounters:   10/04/18 156/78   07/19/18 119/67   07/19/18 128/62     Nicotine Dependence Review:  The patient currently is smoking 1/4  Ppd. she has smoked for multiple years. she has tried to quit in the past. Not interested in quitting at this time using medications.     Review of Systems  Constitutional: negative for fevers, chills, anorexia and weight loss  Respiratory:  negative for cough, hemoptysis, dyspnea, and wheezing  CV:   negative for chest pain, palpitations, and lower extremity edema  GI:   negative for nausea, vomiting, diarrhea, abdominal pain, and melena  Endo:               negative for polyuria,polydipsia,polyphagia, and heat intolerance  Genitourinary: negative for frequency, urgency, dysuria, retention, and hematuria  Integument:  negative for rash, ulcerations, and pruritus  Hematologic:  negative for easy bruising and bleeding  Musculoskel: negative for arthralgias, muscle weakness,and joint pain/swelling  Neurological:  negative for headaches, dizziness, vertigo,and memory/gait problems  Behavl/Psych: negative for feelings of anxiety, depression, suicide, and mood changes    Past Medical History:   Diagnosis Date    Chronic leg pain 11/3/2010    fibromyalgia    COPD (chronic obstructive pulmonary disease) ([facility] [address] 9/18/2009    GERD (gastroesophageal reflux disease)     History of kidney cancer     Hypertension 9/18/2009    Leukopenia 9/18/2009    Other ill-defined conditions(799.89)     fibromyalgia    Vitamin D deficiency 7/7/2010     Past Surgical History:   Procedure Laterality Date    HX GYN      hysterectomy    HX HYSTERECTOMY      HX OOPHORECTOMY       Social History     Social History    Marital status: SINGLE     Spouse name: N/A    Number of children: N/A    Years of education: N/A     Social History Main Topics    Smoking status: Current Every Day Smoker     Packs/day: 0.50    Smokeless tobacco: Never Used    Alcohol use Yes      Comment: occasionally    Drug use: No    Sexual activity: Yes     Partners: Male     Birth control/ protection: Surgical     Other Topics Concern    None     Social History Narrative     Family History   Problem Relation Age of Onset    Diabetes Mother     Alcohol abuse Father     Cancer Sister      breast    Breast Cancer Sister      onset; around 54    Cancer Brother      breast    Breast Cancer Brother      onset: around 36    No Known Problems Sister     No Known Problems Brother      Current Outpatient Prescriptions   Medication Sig Dispense Refill    mupirocin (BACTROBAN) 2 % ointment Apply to right nostril twice daily x5 days 15 g 0    hydroCHLOROthiazide (HYDRODIURIL) 25 mg tablet Take 1 Tab by mouth daily. 90 Tab 3    amLODIPine (NORVASC) 10 mg tablet Take 1 Tab by mouth daily. 90 Tab 3     Allergies   Allergen Reactions    Lisinopril Cough     Other reaction(s):  Adverse reaction to substance     Lipitor [Atorvastatin] Vertigo       Objective:  Visit Vitals    /78 (BP 1 Location: Right arm, BP Patient Position: Sitting)    Pulse 74    Temp 98.2 °F (36.8 °C) (Oral)    Resp 18    Ht 4' 11\" (1.499 m)    Wt 91 lb (41.3 kg)    SpO2 98%    BMI 18.38 kg/m2     Wt Readings from Last 3 Encounters:   10/04/18 91 lb (41.3 kg)   07/19/18 88 lb 9.6 oz (40.2 kg)   07/19/18 89 lb 6.4 oz (40.6 kg)     Physical Exam:   General appearance - alert, well appearing, and in no distress. Very slender. Mental status - A/O x 4, normal mood and affect. Neck -Supple ,normal CSP. FROM, non-tender. No significant adenopathy/thyromegaly. No JVD. Chest - CTA. Symmetric chest rise. No wheezing, rales or rhonchi. Heart - Normal rate, regular rhythm. Normal S1, S2. No MGR or clicks. Abdomen - Soft,non-distended. Normoactive BS in all quadrants. NT, no mass or HSM. Ext- Radial, DP pulses, 2+ bilaterally. No pedal edema, clubbing, or cyanosis. Skin-Warm and dry. No hyperpigmentation, ulcerations, or suspicious lesions. Neuro - Normal speech, no focal findings or movement disorder. Normal strength, gait, and muscle tone. Assessment/Plan:  CHANGED med to exforge . Smoking cessation discussed for 3-10 minutes, pt planning to continue lowering daily use, no meds today. Discussed the patient's BMI with her. The BMI follow up plan is as follows:     weight gain advised  nutrition/feeding management  dietary management education, guidance, and counseling    An After Visit Summary was printed and given to the patient. Medication Side Effects and Warnings were discussed with patient: yes   Patient Labs were reviewed: yes  Patient Past Records were reviewed: yes    See below for other orders   Follow-up Disposition: Not on File      No diagnosis found. No orders of the defined types were placed in this encounter. Ghanshyam Holm expressed understanding of plan. An After Visit Summary was offered/printed and given to the patient.

## 2018-10-04 NOTE — PATIENT INSTRUCTIONS
Learning About ARBs  Introduction    ARBs (angiotensin II receptor blockers) block a hormone that makes blood vessels narrow. As a result, the blood vessels relax and widen. This lowers blood pressure. ARBs also put more water and salt into the urine. This also lowers blood pressure. ARBs can treat:  · High blood pressure. · Coronary artery disease. · Heart failure. They also may be used to help your kidneys when you have diabetes. Examples  ARBs include:  · Candesartan (Atacand). · Irbesartan (Avapro). · Losartan (Cozaar). This is not a complete list of all ARBs. Possible side effects  Side effects may include:  · Low blood pressure. You may feel dizzy and weak. · Diarrhea. · High potassium levels. You may have other side effects or reactions not listed here. Check the information that comes with your medicine. What to know about taking this medicine  · ARBs may be used if you had a cough when you tried to take an ACE inhibitor. ARBs are less likely to cause a cough. · You may need regular blood tests. · Take your medicines exactly as prescribed. Call your doctor if you think you are having a problem with your medicine. · Tell your doctor or pharmacist all the medicines you take. This includes over-the-counter medicines, vitamins, herbal products, and supplements. Taking some medicines together can cause problems. · You should not take ARBs if you are pregnant or planning to become pregnant. Where can you learn more? Go to http://guerrero-callie.info/. Enter K212 in the search box to learn more about \"Learning About ARBs. \"  Current as of: December 6, 2017  Content Version: 11.8  © 9164-8322 [employer]. Care instructions adapted under license by [employer] (which disclaims liability or warranty for this information).  If you have questions about a medical condition or this instruction, always ask your healthcare professional. Montse Muñoz disclaims any warranty or liability for your use of this information. Amlodipine/Valsartan (By mouth)   Amlodipine Besylate ([id number]-ZEW-ko-peen BES-i-late), Valsartan (jay jay-BRYANT-tan)  Treats high blood pressure. This medicine contains a calcium channel blocker (CCB) and an angiotensin receptor blocker (ARB). Brand Name(s): Exforge   There may be other brand names for this medicine. When This Medicine Should Not Be Used: This medicine is not right for everyone. Do not use it if you had an allergic reaction to amlodipine or valsartan, or if you are pregnant. How to Use This Medicine:   Tablet  · Take your medicine as directed. Your dose may need to be changed several times to find what works best for you. · Read and follow the patient instructions that come with this medicine. Talk to your doctor or pharmacist if you have any questions. · Missed dose: Take a dose as soon as you remember. If it is almost time for your next dose, wait until then and take a regular dose. Do not take extra medicine to make up for a missed dose. · Store the medicine in a closed container at room temperature, away from heat, moisture, and direct light. Drugs and Foods to Avoid:   Ask your doctor or pharmacist before using any other medicine, including over-the-counter medicines, vitamins, and herbal products. · Do not use this medicine together with aliskiren. · Some foods and medicines can affect how amlodipine/valsartan works. Tell your doctor if you are using any of the following:   ¨ Clarithromycin, cyclosporine, itraconazole, ketoconazole, lithium, rifampin, ritonavir, sildenafil, tacrolimus, telithromycin  ¨ Medicine to lower cholesterol (including simvastatin)  ¨ Diuretic (water pill)  ¨ NSAID pain or arthritis medicine (including aspirin, celecoxib, diclofenac, ibuprofen, naproxen)  · Ask your doctor before you use any medicine, supplement, or salt substitute that contains potassium.   Warnings While Using This Medicine: · It is not safe to take this medicine during pregnancy. It could harm an unborn baby. Tell your doctor right away if you become pregnant. · Tell your doctor if you are breastfeeding, or if you have kidney disease, liver disease, heart or blood vessel disease, angina, heart failure, or a history of heart attack. · This medicine may cause the following problems:   ¨ Worsening angina or risk of heart attack in people with heart problems  ¨ Kidney problems  ¨ High potassium levels  · This medicine could lower your blood pressure too much, especially when you first use it or if you are dehydrated. Stand or sit up slowly if you feel dizzy or lightheaded. Alcohol may make your symptoms worse. · Your doctor will do lab tests at regular visits to check on the effects of this medicine. Keep all appointments. · Do not stop using this medicine without asking your doctor, even if you feel well. This medicine will not cure high blood pressure, but it will help keep it in normal range. You may have to take blood pressure medicine for the rest of your life. · Keep all medicine out of the reach of children. Never share your medicine with anyone. Possible Side Effects While Using This Medicine:   Call your doctor right away if you notice any of these side effects:  · Allergic reaction: Itching or hives, swelling in your face or hands, swelling or tingling in your mouth or throat, chest tightness, trouble breathing  · Change in how much or how often you urinate, bloody or cloudy urine  · Chest pain that may spread, trouble breathing, nausea, unusual sweating  · Confusion, weakness, uneven heartbeat, numbness in your hands, feet, or lips  · Lightheadedness, dizziness, fainting  · Rapid weight gain, swelling in your hands, ankles, or feet  If you notice other side effects that you think are caused by this medicine, tell your doctor. Call your doctor for medical advice about side effects.  You may report side effects to FDA at 1-800-FDA-1088  © 2017 [facility] Information is for End User's use only and may not be sold, redistributed or otherwise used for commercial purposes. The above information is an  only. It is not intended as medical advice for individual conditions or treatments. Talk to your doctor, nurse or pharmacist before following any medical regimen to see if it is safe and effective for you.

## 2018-10-04 NOTE — MR AVS SNAPSHOT
[address] [place] [address] [address] [address] 
[phone] Patient: Cody Gallagher MRN:  :[id number] Visit Information Date & Time Provider Department Dept. Phone Encounter #  
 10/4/2018  8:00 AM Faviola Kennedy NP [address] [facility] [phone] [id number] Follow-up Instructions Return in about 4 weeks (around 2018) for HTN med change. Upcoming Health Maintenance Date Due Shingrix Vaccine Age 50> (1 of 2) 2006 Pneumococcal 19-64 Highest Risk (2 of 3 - PCV13) 2017 Influenza Age 5 to Adult 2018 BREAST CANCER SCRN MAMMOGRAM 2020 COLONOSCOPY 10/5/2021 DTaP/Tdap/Td series (2 - Td) 2026 Allergies as of 10/4/2018  Review Complete On: 10/4/2018 By: Harvey Ayon LPN Severity Noted Reaction Type Reactions Lisinopril Medium 2011    Cough Other reaction(s): Adverse reaction to substance Lipitor [Atorvastatin]  2017   Side Effect Vertigo Current Immunizations  Never Reviewed Name Date Influenza Vaccine 11/10/2015 Influenza Vaccine (Quad) PF  Incomplete Pneumococcal Polysaccharide (PPSV-23) 2016 11:17 AM  
 Td, Adsorbed PF 3/19/2015 Tdap 2016  9:08 AM  
  
 Not reviewed this visit You Were Diagnosed With   
  
 Codes Comments Essential hypertension    -  Primary ICD-10-CM: I10 
ICD-9-CM: 401.9 BMI less than 19,adult     ICD-10-CM: Z68.1 ICD-9-CM: V85.0 Cigarette nicotine dependence without complication     NQX-15-BL: F17.210 ICD-9-CM: 305.1 Encounter for immunization     ICD-10-CM: J74 ICD-9-CM: V03.89 Non-adherence to medical treatment     ICD-10-CM: Z91.19 ICD-9-CM: V15.81 Vitals BP Pulse Temp Resp Height(growth percentile) Weight(growth percentile) 156/78 (BP 1 Location: Right arm, BP Patient Position: Sitting) 74 98.2 °F (36.8 °C) (Oral) 18 4' 11\" (1.499 m) 91 lb (41.3 kg) SpO2 BMI OB Status Smoking Status 98% 18.38 kg/m2 Hysterectomy Current Every Day Smoker Vitals History BMI and BSA Data Body Mass Index Body Surface Area  
 18.38 kg/m 2 1.31 m 2 Preferred Pharmacy Pharmacy Name Phone [id number]- [place], [address] [phone] Your Updated Medication List  
  
   
This list is accurate as of 10/4/18  8:52 AM.  Always use your most recent med list. amLODIPine-valsartan  mg per tablet Commonly known as:  Pepper Mixer Take 1 Tab by mouth daily. To replace Amlodipine and HCTZ  
  
 mupirocin 2 % ointment Commonly known as:  [facility] Apply to right nostril twice daily x5 days Prescriptions Sent to Pharmacy Refills  
 amLODIPine-valsartan (EXFORGE)  mg per tablet 3 Sig: Take 1 Tab by mouth daily. To replace Amlodipine and HCTZ Class: Normal  
 Pharmacy: [facility]/pharmacy #[id number]- [address]  #: [phone] Route: Oral  
  
We Performed the Following INFLUENZA VIRUS VAC QUAD,SPLIT,PRESV FREE SYRINGE IM [id number] CPT(R)] Follow-up Instructions Return in about 4 weeks (around 11/1/2018) for HTN med change. Patient Instructions Learning About ARBs Introduction ARBs (angiotensin II receptor blockers) block a hormone that makes blood vessels narrow. As a result, the blood vessels relax and widen. This lowers blood pressure. ARBs also put more water and salt into the urine. This also lowers blood pressure. ARBs can treat: 
· High blood pressure. · Coronary artery disease. · Heart failure. They also may be used to help your kidneys when you have diabetes. Examples ARBs include: 
· Candesartan (Atacand). · Irbesartan (Avapro). · Losartan (Cozaar). This is not a complete list of all ARBs. Possible side effects Side effects may include: · Low blood pressure. You may feel dizzy and weak. · Diarrhea. · High potassium levels. You may have other side effects or reactions not listed here. Check the information that comes with your medicine. What to know about taking this medicine · ARBs may be used if you had a cough when you tried to take an ACE inhibitor. ARBs are less likely to cause a cough. · You may need regular blood tests. · Take your medicines exactly as prescribed. Call your doctor if you think you are having a problem with your medicine. · Tell your doctor or pharmacist all the medicines you take. This includes over-the-counter medicines, vitamins, herbal products, and supplements. Taking some medicines together can cause problems. · You should not take ARBs if you are pregnant or planning to become pregnant. Where can you learn more? Go to http://guerrero-callie.info/. Enter K212 in the search box to learn more about \"Learning About ARBs. \" Current as of: December 6, 2017 Content Version: 11.8 © 2895-2644 [employer]. Care instructions adapted under license by placespourtous.com (which disclaims liability or warranty for this information). If you have questions about a medical condition or this instruction, always ask your healthcare professional. [place] [address] any warranty or liability for your use of this information. Amlodipine/Valsartan (By mouth) Amlodipine Besylate (uu-WOT-xd-peen BES-i-late), Valsartan (jay jay-BRYANT-tan) Treats high blood pressure. This medicine contains a calcium channel blocker (CCB) and an angiotensin receptor blocker (ARB). Brand Name(s): Exforge There may be other brand names for this medicine. When This Medicine Should Not Be Used: This medicine is not right for everyone. Do not use it if you had an allergic reaction to amlodipine or valsartan, or if you are pregnant. How to Use This Medicine:  
Tablet · Take your medicine as directed. Your dose may need to be changed several times to find what works best for you. · Read and follow the patient instructions that come with this medicine. Talk to your doctor or pharmacist if you have any questions. · Missed dose: Take a dose as soon as you remember. If it is almost time for your next dose, wait until then and take a regular dose. Do not take extra medicine to make up for a missed dose. · Store the medicine in a closed container at room temperature, away from heat, moisture, and direct light. Drugs and Foods to Avoid: Ask your doctor or pharmacist before using any other medicine, including over-the-counter medicines, vitamins, and herbal products. · Do not use this medicine together with aliskiren. · Some foods and medicines can affect how amlodipine/valsartan works. Tell your doctor if you are using any of the following: ¨ Clarithromycin, cyclosporine, itraconazole, ketoconazole, lithium, rifampin, ritonavir, sildenafil, tacrolimus, telithromycin ¨ Medicine to lower cholesterol (including simvastatin) ¨ Diuretic (water pill) ¨ NSAID pain or arthritis medicine (including aspirin, celecoxib, diclofenac, ibuprofen, naproxen) · Ask your doctor before you use any medicine, supplement, or salt substitute that contains potassium. Warnings While Using This Medicine: · It is not safe to take this medicine during pregnancy. It could harm an unborn baby. Tell your doctor right away if you become pregnant. · Tell your doctor if you are breastfeeding, or if you have kidney disease, liver disease, heart or blood vessel disease, angina, heart failure, or a history of heart attack. · This medicine may cause the following problems: ¨ Worsening angina or risk of heart attack in people with heart problems ¨ Kidney problems ¨ High potassium levels · This medicine could lower your blood pressure too much, especially when you first use it or if you are dehydrated. Stand or sit up slowly if you feel dizzy or lightheaded. Alcohol may make your symptoms worse. · Your doctor will do lab tests at regular visits to check on the effects of this medicine. Keep all appointments. · Do not stop using this medicine without asking your doctor, even if you feel well. This medicine will not cure high blood pressure, but it will help keep it in normal range. You may have to take blood pressure medicine for the rest of your life. · Keep all medicine out of the reach of children. Never share your medicine with anyone. Possible Side Effects While Using This Medicine:  
Call your doctor right away if you notice any of these side effects: · Allergic reaction: Itching or hives, swelling in your face or hands, swelling or tingling in your mouth or throat, chest tightness, trouble breathing · Change in how much or how often you urinate, bloody or cloudy urine · Chest pain that may spread, trouble breathing, nausea, unusual sweating · Confusion, weakness, uneven heartbeat, numbness in your hands, feet, or lips · Lightheadedness, dizziness, fainting · Rapid weight gain, swelling in your hands, ankles, or feet If you notice other side effects that you think are caused by this medicine, tell your doctor. Call your doctor for medical advice about side effects. You may report side effects to FDA at 3-060-FDA-6804 © 2017 [address] Information is for End User's use only and may not be sold, redistributed or otherwise used for commercial purposes. The above information is an  only. It is not intended as medical advice for individual conditions or treatments. Talk to your doctor, nurse or pharmacist before following any medical regimen to see if it is safe and effective for you. Introducing [facility] & HEALTH SERVICES! Dear Jackson Najjar: Thank you for requesting a [employer] account.   Our records indicate that you already have an active [employer] account. You can access your account anytime at [email or web address] Did you know that you can access your hospital and ER discharge instructions at any time in [employer]? You can also review all of your test results from your hospital stay or ER visit. Additional Information If you have questions, please visit the Frequently Asked Questions section of the [employer] website at [email or web address] Remember, [employer] is NOT to be used for urgent needs. For medical emergencies, dial 911. Now available from your iPhone and Android! Please provide this summary of care documentation to your next provider. Your primary care clinician is listed as IFDEL Delgado. If you have any questions after today's visit, please call [phone].

## 2018-10-04 NOTE — PROGRESS NOTES
Pt Is here for   Chief Complaint   Patient presents with    Hypertension     follow up     Pt denies pain at this time. 1. Have you been to the ER, urgent care clinic since your last visit? Hospitalized since your last visit? No    2. Have you seen or consulted any other health care providers outside of the [address] [place] [address] since your last visit? Include any pap smears or colon screening.  No

## 2018-10-16 ENCOUNTER — OFFICE VISIT (OUTPATIENT)
Dept: PRIMARY CARE CLINIC | Age: 63
End: 2018-10-16

## 2018-10-16 VITALS
OXYGEN SATURATION: 96 % | WEIGHT: 90.8 LBS | SYSTOLIC BLOOD PRESSURE: 114 MMHG | TEMPERATURE: 97.8 F | BODY MASS INDEX: 18.31 KG/M2 | RESPIRATION RATE: 14 BRPM | HEART RATE: 92 BPM | DIASTOLIC BLOOD PRESSURE: 51 MMHG | HEIGHT: 59 IN

## 2018-10-16 DIAGNOSIS — S90.852A FOREIGN BODY IN LEFT FOOT, INITIAL ENCOUNTER: Primary | ICD-10-CM

## 2018-10-16 NOTE — PROGRESS NOTES
Chief Complaint   Patient presents with    Foot Pain     she is a 65y.o. year old female who presents for evalution. She has a possible splinter in her left foot near the ball of the foot. It has been painful x 2 weeks. It is a dark spot under the skin. There is no drainage or swelling. Reviewed PmHx, RxHx, FmHx, SocHx, AllgHx and updated and dated in the chart. Review of Systems - negative except as listed above in the HPI    Objective:     Vitals:    10/16/18 1458   BP: 114/51   Pulse: 92   Resp: 14   Temp: 97.8 °F (36.6 °C)   TempSrc: Oral   SpO2: 96%   Weight: 90 lb 12.8 oz (41.2 kg)   Height: 4' 11\" (1.499 m)       Current Outpatient Prescriptions   Medication Sig    amLODIPine-valsartan (EXFORGE)  mg per tablet Take 1 Tab by mouth daily. To replace Amlodipine and HCTZ    mupirocin (BACTROBAN) 2 % ointment Apply to right nostril twice daily x5 days     No current facility-administered medications for this visit. Physical Examination: General appearance - alert, well appearing, and in no distress  Skin - normal coloration and turgor, no rashes, no suspicious skin lesions noted . Left plantar foot surface with foreign body, skin has healed over the spot. Painful with pressure or with walking. Assessment/ Plan:   Diagnoses and all orders for this visit:    1. Foreign body in left foot, initial encounter  -     REFERRAL TO PODIATRY     I have encouraged her to soak with epsom salt, keep pressure off and see podiatry to evaluate and remove. Follow-up Disposition:  Return if symptoms worsen or fail to improve. I have discussed the diagnosis with the patient and the intended plan as seen in the above orders. The patient has received an after-visit summary and questions were answered concerning future plans. Pt conveyed understanding of plan.     Medication Side Effects and Warnings were discussed with patient      Betsey Hughes NP

## 2018-10-16 NOTE — MR AVS SNAPSHOT
[address] 
[phone] Patient: Micky Sheikh MRN: [id number] :[id number] Visit Information Date & Time Provider Department Dept. Phone Encounter #  
 10/16/2018  2:45 PM Herve Vega, [address] [id number] Follow-up Instructions Return if symptoms worsen or fail to improve. Your Appointments 2018  8:15 AM  
ROUTINE CARE with [employer], NP  
[facility]) Appt Note: htn med change [address] [facility] [address] [address]  
[phone]  
  
   
 [address] [place] Upcoming Health Maintenance Date Due Shingrix Vaccine Age 50> (1 of 2) 2006 Pneumococcal 19-64 Highest Risk (2 of 3 - PCV13) 2017 BREAST CANCER SCRN MAMMOGRAM 2020 COLONOSCOPY 10/5/2021 DTaP/Tdap/Td series (2 - Td) 2026 Allergies as of 10/16/2018  Review Complete On: 10/4/2018 By: Damien Carlson. EVER Ayon Severity Noted Reaction Type Reactions Lisinopril Medium 2011    Cough Other reaction(s): Adverse reaction to substance Lipitor [Atorvastatin]  2017   Side Effect Vertigo Current Immunizations  Reviewed on 10/4/2018 Name Date Influenza Vaccine 11/10/2015 Influenza Vaccine (Quad) PF 10/4/2018  8:59 AM  
 Pneumococcal Polysaccharide (PPSV-23) 2016 11:17 AM  
 Td, Adsorbed PF 3/19/2015 Tdap 2016  9:08 AM  
  
 Not reviewed this visit You Were Diagnosed With   
  
 Codes Comments Foreign body in left foot, initial encounter    -  Primary ICD-10-CM: G78.450K ICD-9-CM: 917.6 Vitals BP Pulse Temp Resp Height(growth percentile) Weight(growth percentile) 114/51 92 97.8 °F (36.6 °C) (Oral) 14 4' 11\" (1.499 m) 90 lb 12.8 oz (41.2 kg) SpO2 BMI OB Status Smoking Status 96% 18.34 kg/m2 Hysterectomy Current Every Day Smoker Vitals History BMI and BSA Data Body Mass Index Body Surface Area  
 18.34 kg/m 2 1.31 m 2 Preferred Pharmacy Pharmacy Name Phone [id number] [address] [phone] Your Updated Medication List  
  
   
This list is accurate as of 10/16/18  3:14 PM.  Always use your most recent med list. amLODIPine-valsartan  mg per tablet Commonly known as:  Garrel Quoc Take 1 Tab by mouth daily. To replace Amlodipine and HCTZ  
  
 mupirocin 2 % ointment Commonly known as:  [employer] Apply to right nostril twice daily x5 days We Performed the Following REFERRAL TO PODIATRY [REF90 Custom] Follow-up Instructions Return if symptoms worsen or fail to improve. Referral Information Referral ID Referred By Referred To  
  
 [id number] [facility], [address] [place], [address] [facility] Visits Status Start Date End Date 1 New Request 10/16/18 10/16/19 If your referral has a status of pending review or denied, additional information will be sent to support the outcome of this decision. Patient Instructions Object in the Skin: Care Instructions Your Care Instructions Small objects (splinters) of wood, metal, glass, or plastic can become embedded in the skin. Thorns from [employer] and other plants also can prick or become stuck in the skin. Splinters can cause an infection if they are not removed. Your doctor probably removed the object and cleaned the skin well. Your doctor may have given you antibiotics to prevent infection and a tetanus shot if you had not had one in the last 5 years or do not know when you had your last one. For a few days, you may have pain and itching in the wound where the object was removed. Follow-up care is a key part of your treatment and safety. Be sure to make and go to all appointments, and call your doctor if you are having problems. It's also a good idea to know your test results and keep a list of the medicines you take. How can you care for yourself at home? · If your doctor told you how to care for your wound, follow your doctor's instructions. If you did not get instructions, follow this general advice: ¨ Wash the wound with clean water 2 times a day. Don't use hydrogen peroxide or alcohol, which can slow healing. ¨ You may cover the wound with a thin layer of petroleum jelly, such as Vaseline, and a nonstick bandage. ¨ Apply more petroleum jelly and replace the bandage as needed. · Your doctor may have used medicine to numb your skin. When it wears off, your pain may return. Take an over-the-counter pain medicine, such as acetaminophen (Tylenol), ibuprofen (Advil, Motrin), or naproxen (Aleve). Read and follow all instructions on the label. · Do not take two or more pain medicines at the same time unless the doctor told you to. Many pain medicines have acetaminophen, which is Tylenol. Too much acetaminophen (Tylenol) can be harmful. · If your doctor prescribed antibiotics, take them as directed. Do not stop taking them just because you feel better. You need to take the full course of antibiotics. · After 2 or 3 days, if your swelling is gone, apply a heating pad set on low or a warm cloth to your wound area. Some doctors suggest that you go back and forth between hot and cold. Put a thin cloth between the heating pad and your skin. · It may help to prop up the affected part of your body on a pillow anytime you sit or lie down during the next 3 days. Try to keep it above the level of your heart. This will help reduce swelling. · Your wound may itch or feel irritated. A little redness and swelling is normal. Do not scratch or rub the wound. When should you call for help? Call your doctor now or seek immediate medical care if: 
  · The skin near the wound is cool or pale or changes color.  
  · You have tingling, weakness, or numbness in the area near the wound.  
  · The wound starts to bleed, and blood soaks the bandage. Oozing small amounts of blood is normal.  
  · You have trouble moving a limb near the wound.  
  · You have signs of infection, such as: 
¨ Increased pain, swelling, warmth, or redness. ¨ Red streaks leading from the wound. ¨ Pus draining from the wound. ¨ A fever.  
 Watch closely for changes in your health, and be sure to contact your doctor if: 
  · You do not get better as expected. Where can you learn more? Go to http://guerrero-callie.info/. Enter Finn Macdonald in the search box to learn more about \"Object in the Skin: Care Instructions. \" Current as of: November 20, 2017 Content Version: 11.8 © 1001-5018 [employer]. Care instructions adapted under license by [employer] (which disclaims liability or warranty for this information). If you have questions about a medical condition or this instruction, always ask your healthcare professional. [place] [address] any warranty or liability for your use of this information. Introducing [facility] & HEALTH SERVICES! Dear Nu Xie: Thank you for requesting a [employer] account. Our records indicate that you already have an active [employer] account. You can access your account anytime at [email or web address]/Gripp'n Tech Did you know that you can access your hospital and ER discharge instructions at any time in [employer]? You can also review all of your test results from your hospital stay or ER visit. Additional Information If you have questions, please visit the Frequently Asked Questions section of the [employer] website at [email or web address] Remember, [employer] is NOT to be used for urgent needs. For medical emergencies, dial 911. Now available from your iPhone and Android! Please provide this summary of care documentation to your next provider. Your primary care clinician is listed as FIDEL Varma. If you have any questions after today's visit, please call [phone].

## 2018-10-16 NOTE — PATIENT INSTRUCTIONS
Object in the Skin: Care Instructions  Your Care Instructions  Small objects (splinters) of wood, metal, glass, or plastic can become embedded in the skin. Thorns from [employer] and other plants also can prick or become stuck in the skin. Splinters can cause an infection if they are not removed. Your doctor probably removed the object and cleaned the skin well. Your doctor may have given you antibiotics to prevent infection and a tetanus shot if you had not had one in the last 5 years or do not know when you had your last one. For a few days, you may have pain and itching in the wound where the object was removed. Follow-up care is a key part of your treatment and safety. Be sure to make and go to all appointments, and call your doctor if you are having problems. It's also a good idea to know your test results and keep a list of the medicines you take. How can you care for yourself at home? · If your doctor told you how to care for your wound, follow your doctor's instructions. If you did not get instructions, follow this general advice:  ¨ Wash the wound with clean water 2 times a day. Don't use hydrogen peroxide or alcohol, which can slow healing. ¨ You may cover the wound with a thin layer of petroleum jelly, such as Vaseline, and a nonstick bandage. ¨ Apply more petroleum jelly and replace the bandage as needed. · Your doctor may have used medicine to numb your skin. When it wears off, your pain may return. Take an over-the-counter pain medicine, such as acetaminophen (Tylenol), ibuprofen (Advil, Motrin), or naproxen (Aleve). Read and follow all instructions on the label. · Do not take two or more pain medicines at the same time unless the doctor told you to. Many pain medicines have acetaminophen, which is Tylenol. Too much acetaminophen (Tylenol) can be harmful. · If your doctor prescribed antibiotics, take them as directed. Do not stop taking them just because you feel better.  You need to take the full course of antibiotics. · After 2 or 3 days, if your swelling is gone, apply a heating pad set on low or a warm cloth to your wound area. Some doctors suggest that you go back and forth between hot and cold. Put a thin cloth between the heating pad and your skin. · It may help to prop up the affected part of your body on a pillow anytime you sit or lie down during the next 3 days. Try to keep it above the level of your heart. This will help reduce swelling. · Your wound may itch or feel irritated. A little redness and swelling is normal. Do not scratch or rub the wound. When should you call for help? Call your doctor now or seek immediate medical care if:    · The skin near the wound is cool or pale or changes color.     · You have tingling, weakness, or numbness in the area near the wound.     · The wound starts to bleed, and blood soaks the bandage. Oozing small amounts of blood is normal.     · You have trouble moving a limb near the wound.     · You have signs of infection, such as:  ¨ Increased pain, swelling, warmth, or redness. ¨ Red streaks leading from the wound. ¨ Pus draining from the wound. ¨ A fever.    Watch closely for changes in your health, and be sure to contact your doctor if:    · You do not get better as expected. Where can you learn more? Go to http://guerrero-callie.info/. Enter Salvatoreleslie Andrew in the search box to learn more about \"Object in the Skin: Care Instructions. \"  Current as of: November 20, 2017  Content Version: 11.8  © 1910-2950 [employer]. Care instructions adapted under license by [employer] (which disclaims liability or warranty for this information). If you have questions about a medical condition or this instruction, always ask your healthcare professional. [place] [address] any warranty or liability for your use of this information.

## 2018-11-01 ENCOUNTER — OFFICE VISIT (OUTPATIENT)
Dept: INTERNAL MEDICINE CLINIC | Age: 63
End: 2018-11-01

## 2018-11-01 VITALS
DIASTOLIC BLOOD PRESSURE: 69 MMHG | SYSTOLIC BLOOD PRESSURE: 123 MMHG | HEIGHT: 59 IN | RESPIRATION RATE: 17 BRPM | BODY MASS INDEX: 18.44 KG/M2 | WEIGHT: 91.5 LBS | OXYGEN SATURATION: 97 % | TEMPERATURE: 97.3 F | HEART RATE: 78 BPM

## 2018-11-01 DIAGNOSIS — I10 ESSENTIAL HYPERTENSION: Primary | ICD-10-CM

## 2018-11-01 NOTE — PROGRESS NOTES
Tommie Childress is a 65 y.o. female and presents with Hypertension (follow up, med change)    Subjective:  Hypertension Review:  The patient has hypertension. Works often for 12 hours and sleeping an average of 6 hours  Diet and Lifestyle: generally does try to follow a  low sodium diet, exercises sporadically   Home BP Monitoring: is not measured at home. Pertinent ROS: taking medications as instructed , no medication side effects. No TIA's, chest pain on exertion, dyspnea on exertion, or swelling of ankles.    BP Readings from Last 3 Encounters:   11/01/18 123/69   10/16/18 114/51   10/04/18 156/78       Review of Systems  Constitutional: negative for fevers, chills, anorexia and weight loss  Respiratory:  negative for cough, hemoptysis, dyspnea, and wheezing  CV:   negative for chest pain, palpitations, and lower extremity edema  GI:   negative for nausea, vomiting, diarrhea, abdominal pain, and melena  Endo:               negative for polyuria,polydipsia,polyphagia, and heat intolerance  Genitourinary: negative for frequency, urgency, dysuria, retention, and hematuria  Integument:  negative for rash, ulcerations, and pruritus  Hematologic:  negative for easy bruising and bleeding  Musculoskel: negative for arthralgias, muscle weakness,and joint pain/swelling  Neurological:  negative for headaches, dizziness, vertigo,and memory/gait problems  Behavl/Psych: negative for feelings of anxiety, depression, suicide, and mood changes    Past Medical History:   Diagnosis Date    Chronic leg pain 11/3/2010    fibromyalgia    COPD (chronic obstructive pulmonary disease) ([facility] [address] 9/18/2009    GERD (gastroesophageal reflux disease)     History of kidney cancer     Hypertension 9/18/2009    Leukopenia 9/18/2009    Other ill-defined conditions(799.89)     fibromyalgia    Vitamin D deficiency 7/7/2010     Past Surgical History:   Procedure Laterality Date    HX GYN      hysterectomy    HX HYSTERECTOMY      HX OOPHORECTOMY       Social History     Socioeconomic History    Marital status: SINGLE     Spouse name: Not on file    Number of children: Not on file    Years of education: Not on file    Highest education level: Not on file   Social Needs    Financial resource strain: Not on file    Food insecurity - worry: Not on file    Food insecurity - inability: Not on file    Transportation needs - medical: Not on file   [employer] needs - non-medical: Not on file   Occupational History    Not on file   Tobacco Use    Smoking status: Current Every Day Smoker     Packs/day: 0.50    Smokeless tobacco: Never Used   Substance and Sexual Activity    Alcohol use: Yes     Comment: occasionally    Drug use: No    Sexual activity: Yes     Partners: Male     Birth control/protection: Surgical   Other Topics Concern    Not on file   Social History Narrative    Not on file     Family History   Problem Relation Age of Onset    Diabetes Mother     Alcohol abuse Father     Cancer Sister         breast    Breast Cancer Sister         onset; around 54    Cancer Brother         breast    Breast Cancer Brother         onset: around 36    No Known Problems Sister     No Known Problems Brother      Current Outpatient Medications   Medication Sig Dispense Refill    amLODIPine-valsartan (EXFORGE)  mg per tablet Take 1 Tab by mouth daily. To replace Amlodipine and HCTZ 90 Tab 3    mupirocin (BACTROBAN) 2 % ointment Apply to right nostril twice daily x5 days 15 g 0     Allergies   Allergen Reactions    Lisinopril Cough     Other reaction(s):  Adverse reaction to substance     Lipitor [Atorvastatin] Vertigo       Objective:  Visit Vitals  /69 (BP 1 Location: Right arm, BP Patient Position: Sitting)   Pulse 78   Temp 97.3 °F (36.3 °C) (Oral)   Resp 17   Ht 4' 11\" (1.499 m)   Wt 91 lb 8 oz (41.5 kg)   SpO2 97%   BMI 18.48 kg/m²     Wt Readings from Last 3 Encounters:   11/01/18 91 lb 8 oz (41.5 kg)   10/16/18 90 lb 12.8 oz (41.2 kg)   10/04/18 91 lb (41.3 kg)     Physical Exam:   General appearance - alert, well appearing, and in no distress. Very slender. Mental status - A/O x 4, normal mood and affect. Neck -Supple ,normal CSP. FROM, non-tender. No significant adenopathy/thyromegaly. No JVD. Chest - CTA. Symmetric chest rise. No wheezing, rales or rhonchi. Heart - Normal rate, regular rhythm. Normal S1, S2. No MGR or clicks. Abdomen - Soft,non-distended. Normoactive BS in all quadrants. NT, no mass or HSM. Ext- Radial, DP pulses, 2+ bilaterally. No pedal edema, clubbing, or cyanosis. Skin-Warm and dry. No hyperpigmentation, ulcerations, or suspicious lesions. Neuro - Normal speech, no focal findings or movement disorder. Normal strength, gait, and muscle tone. Assessment/Plan:  The current medical regimen is effective;  continue present plan and medications. Medication Side Effects and Warnings were discussed with patient: yes   Patient Labs were reviewed: yes  Patient Past Records were reviewed: yes    See below for other orders   Follow-up Disposition:  Return in about 6 months (around 5/1/2019) for HTN. ICD-10-CM ICD-9-CM    1. Essential hypertension I10 401.9      No orders of the defined types were placed in this encounter. Ardie Heimlich expressed understanding of plan. An After Visit Summary was offered/printed and given to the patient.

## 2018-11-01 NOTE — PROGRESS NOTES
Pt is here for   Chief Complaint   Patient presents with    Hypertension     follow up, med change     Pt denies pain at this time     1. Have you been to the ER, urgent care clinic since your last visit? Hospitalized since your last visit? No    2. Have you seen or consulted any other health care providers outside of the [address] [place] [address] since your last visit? Include any pap smears or colon screening.  No

## 2018-11-01 NOTE — PATIENT INSTRUCTIONS
Work-Life Balance: Care Instructions  Your Care Instructions    Do you ever feel like there is not enough time to do all of the things you have to do, and no time at all for the things you enjoy? If so, you are not alone. On average, people in the [place] have worked more and more hours each year since 1970. But in recent years, fewer people say they want to take on more at work, even if they would get promoted or get paid more money. More and more workers say they want time to spend with their families and to do things that are important to them. Do you ever feel:  · That you always have more and more work to do at your job? · That too many people depend on you every day? · That you never have enough time for your family or friends? · That you never have time for hobbies or things you enjoy? · That each second of your day is scheduled? If you answered \"yes\" to any of these questions, take steps at work and at home to get your life into balance. Follow-up care is a key part of your treatment and safety. Be sure to make and go to all appointments, and call your doctor if you are having problems. How can you care for yourself at home? Manage your time  · Focus on the important things. Taking on too much can wear you out. Look at how you spend your time, and redirect your focus. Learn to say \"no\" and let go of things that do not matter. · Set one small goal at a time. Use a day planner. Break large projects into smaller ones. · Ask for help. Let your children, your spouse, your coworkers, and other people in your life help you get things done. · Leave your job at the office. If you give up free time to get more work done, you may pay for it with stress. If your job offers a flexible work schedule, use it to fit your own work style. For instance, come in earlier to have a longer lunch break, or make time for a yoga class or workout during your workday. · Unplug.  Do not let technology (such as your cell phone or the Internet) erase the line between your time and your employer's time. Lower job stress  Job stress causes trouble at work and at home. At work, you may worry about things you have not had time to do at home. At home, you may worry about your job. This cycle upsets your work-life balance. Lowering your job stress can get your life back in balance. Job stress can be caused by:  · Pressure and deadlines. · Heavy workloads or long hours. · Not being allowed to make decisions. · Health and safety hazards. · Feeling you may lose your job. · Unclear or changing job duties. · Too much responsibility. · Work that is very tiring or boring. Do any of these things bother you? Consider talking with your boss to change things. There are some things that you may not be able to control. But even a few small changes might help lower your stress. Take advantage of programs at work  Businesses make money and are better off in other ways if their employees are healthy and happy. For this reason, many companies have programs to help balance work life and home life. These programs may include:  · Flexible schedules and hours. · Time off for family reasons, education, or community service. · Being able to work from home. · Employee assistance programs to provide counseling. · Child-care programs. Check to see if your company has any of these or other programs that could help you. If not, consider talking to your boss about why work-life balance programs make good business sense. Even if your company does not start an official program, you may be able to get flexible hours, time off, or the ability to do some work from home. Know when to quit  If you are truly unhappy because of a stressful job, and if the suggestions here have not worked, it may be time to think about changing jobs or changing careers. But before you quit, take time to research your options. Where can you learn more?   Go to http://guerrero-callie.info/. Enter F363 in the search box to learn more about \"Work-Life Balance: Care Instructions. \"  Current as of: October 10, 2017  Content Version: 11.8  © 7364-6822 [employer]. Care instructions adapted under license by [employer] (which disclaims liability or warranty for this information). If you have questions about a medical condition or this instruction, always ask your healthcare professional. [address] any warranty or liability for your use of this information.

## 2019-02-19 ENCOUNTER — HOSPITAL ENCOUNTER (OUTPATIENT)
Dept: ULTRASOUND IMAGING | Age: 64
Discharge: HOME | End: 2019-02-19
Attending: UROLOGY
Payer: COMMERCIAL

## 2019-02-19 DIAGNOSIS — Z85.528 PERSONAL HISTORY OF RENAL CANCER: ICD-10-CM

## 2019-02-19 PROCEDURE — 76770 US EXAM ABDO BACK WALL COMP: CPT

## 2019-06-18 ENCOUNTER — OFFICE VISIT (OUTPATIENT)
Dept: INTERNAL MEDICINE CLINIC | Age: 64
End: 2019-06-18

## 2019-06-18 VITALS
BODY MASS INDEX: 17.42 KG/M2 | OXYGEN SATURATION: 99 % | WEIGHT: 86.4 LBS | DIASTOLIC BLOOD PRESSURE: 77 MMHG | TEMPERATURE: 98.3 F | HEART RATE: 93 BPM | SYSTOLIC BLOOD PRESSURE: 149 MMHG | RESPIRATION RATE: 18 BRPM | HEIGHT: 59 IN

## 2019-06-18 DIAGNOSIS — F43.21 GRIEVING: ICD-10-CM

## 2019-06-18 DIAGNOSIS — F17.210 CIGARETTE NICOTINE DEPENDENCE WITHOUT COMPLICATION: ICD-10-CM

## 2019-06-18 DIAGNOSIS — Z23 ENCOUNTER FOR IMMUNIZATION: ICD-10-CM

## 2019-06-18 DIAGNOSIS — I10 ESSENTIAL HYPERTENSION: Primary | ICD-10-CM

## 2019-06-18 NOTE — PROGRESS NOTES
Chief Complaint   Patient presents with    Hypertension     1. Have you been to the ER, urgent care clinic since your last visit? Hospitalized since your last visit? No    2. Have you seen or consulted any other health care providers outside of the [address] [place] [address] since your last visit? Include any pap smears or colon screening.  No

## 2019-06-18 NOTE — PATIENT INSTRUCTIONS
Grief (Actual/Anticipated): Care Instructions  Your Care Instructions    Grief is your emotional reaction to a major loss. The words \"sorrow\" and \"heartache\" often are used to describe feelings of grief. You feel grief when you lose a beloved person, pet, place, or thing. It is also natural to feel grief when you lose a valued way of life, such as a job, marriage, or good health. You may begin to grieve before a loss occurs. You may grieve for a loved one who is sick and dying. Children and adults often feel the pain of loss before a big move or divorce. This type of grief helps you get ready for a loss. Grief is different for each person. There is no \"normal\" or \"expected\" period of time for grieving. Some people adjust to their loss within a couple of months. Others may take 2 years or longer, especially if their lives were changed a lot or if the loss was sudden and shocking. Grieving can cause problems such as headaches, loss of appetite, and trouble with thinking or sleeping. You may withdraw from friends and family and behave in ways that are unusual for you. Grief may cause you to question your beliefs and views about life. Grief is natural and does not require medical treatment. But if you have trouble sleeping, it may help to take sleeping pills for a short time. It may help to talk with people who have been through or are going through similar losses. You may also want to talk to a counselor about your feelings. Talking about your loss, sharing your cares and concerns, and getting support from others are important parts of healthy grieving. Follow-up care is a key part of your treatment and safety. Be sure to make and go to all appointments, and call your doctor if you are having problems. It's also a good idea to know your test results and keep a list of the medicines you take. How can you care for yourself at home? · Get enough sleep. Your mind helps make sense of your life while you sleep. Missing sleep can lead to illness and make it harder for you to deal with your grief. · Eat healthy foods. Try to avoid eating only foods that give you comfort. Ask someone to join you for a meal if you do not like eating alone. Consider taking a multivitamin every day. · Get some exercise every day. Even a walk can help you deal with your grief. Other exercises, such as yoga, can also help you manage stress. · Comfort yourself. Take time to look at photos or use special items that make you feel better. · Stay involved in your life. Do not withdraw from the activities you enjoy. People you know at work, [employer], clubs, or other groups can help you get through your period of grief. · Think about joining a support group to help you deal with your grief. There are many support groups to help people recover from grief. When should you call for help? Call 911 anytime you think you may need emergency care. For example, call if:    · You feel you cannot stop from hurting yourself or someone else.    Watch closely for changes in your health, and be sure to contact your doctor if:    · You think you may be depressed.     · You do not get better as expected. Where can you learn more? Go to http://guerrero-acllie.info/. Enter H249 in the search box to learn more about \"Grief (Actual/Anticipated): Care Instructions. \"  Current as of: April 18, 2018  Content Version: 11.9  © 8105-1571 [employer]. Care instructions adapted under license by [employer] (which disclaims liability or warranty for this information). If you have questions about a medical condition or this instruction, always ask your healthcare professional. [address] any warranty or liability for your use of this information.

## 2019-06-19 DIAGNOSIS — I10 ESSENTIAL HYPERTENSION: ICD-10-CM

## 2019-06-20 RX ORDER — AMLODIPINE AND VALSARTAN 10; 160 MG/1; MG/1
1 TABLET ORAL DAILY
Qty: 90 TAB | Refills: 3 | Status: SHIPPED | OUTPATIENT
Start: 2019-06-20 | End: 2019-07-12 | Stop reason: ALTCHOICE

## 2019-07-12 ENCOUNTER — DOCUMENTATION ONLY (OUTPATIENT)
Dept: INTERNAL MEDICINE CLINIC | Age: 64
End: 2019-07-12

## 2019-07-12 RX ORDER — VALSARTAN 160 MG/1
160 TABLET ORAL DAILY
Qty: 90 TAB | Refills: 3 | Status: SHIPPED | OUTPATIENT
Start: 2019-07-12 | End: 2019-10-16 | Stop reason: CLARIF

## 2019-07-12 RX ORDER — AMLODIPINE BESYLATE 10 MG/1
10 TABLET ORAL DAILY
Qty: 90 TAB | Refills: 3 | Status: SHIPPED | OUTPATIENT
Start: 2019-07-12 | End: 2020-10-02 | Stop reason: SDUPTHER

## 2019-09-28 ENCOUNTER — HOSPITAL ENCOUNTER (EMERGENCY)
Age: 64
Discharge: HOME | End: 2019-09-28
Attending: EMERGENCY MEDICINE
Payer: COMMERCIAL

## 2019-09-28 ENCOUNTER — APPOINTMENT (OUTPATIENT)
Dept: CT IMAGING | Age: 64
End: 2019-09-28
Attending: EMERGENCY MEDICINE
Payer: COMMERCIAL

## 2019-09-28 VITALS
OXYGEN SATURATION: 100 % | RESPIRATION RATE: 16 BRPM | WEIGHT: 86.64 LBS | HEART RATE: 70 BPM | DIASTOLIC BLOOD PRESSURE: 87 MMHG | BODY MASS INDEX: 17.47 KG/M2 | HEIGHT: 59 IN | TEMPERATURE: 97.6 F | SYSTOLIC BLOOD PRESSURE: 150 MMHG

## 2019-09-28 DIAGNOSIS — R11.10 VOMITING, INTRACTABILITY OF VOMITING NOT SPECIFIED, PRESENCE OF NAUSEA NOT SPECIFIED, UNSPECIFIED VOMITING TYPE: ICD-10-CM

## 2019-09-28 DIAGNOSIS — K52.9 NONINFECTIOUS GASTROENTERITIS, UNSPECIFIED TYPE: Primary | ICD-10-CM

## 2019-09-28 LAB
ALBUMIN SERPL-MCNC: 4.5 G/DL (ref 3.5–5)
ALBUMIN/GLOB SERPL: 1.1 {RATIO} (ref 1.1–2.2)
ALP SERPL-CCNC: 86 U/L (ref 45–117)
ALT SERPL-CCNC: 25 U/L (ref 12–78)
ANION GAP SERPL CALC-SCNC: 3 MMOL/L (ref 5–15)
APPEARANCE UR: CLEAR
AST SERPL-CCNC: 21 U/L (ref 15–37)
BACTERIA URNS QL MICRO: NEGATIVE /HPF
BASOPHILS # BLD: 0 K/UL (ref 0–0.1)
BASOPHILS NFR BLD: 0 % (ref 0–1)
BILIRUB SERPL-MCNC: 0.5 MG/DL (ref 0.2–1)
BILIRUB UR QL: NEGATIVE
BUN SERPL-MCNC: 14 MG/DL (ref 6–20)
BUN/CREAT SERPL: 16 (ref 12–20)
CALCIUM SERPL-MCNC: 8.7 MG/DL (ref 8.5–10.1)
CHLORIDE SERPL-SCNC: 106 MMOL/L (ref 97–108)
CO2 SERPL-SCNC: 30 MMOL/L (ref 21–32)
COLOR UR: NORMAL
CREAT SERPL-MCNC: 0.88 MG/DL (ref 0.55–1.02)
DIFFERENTIAL METHOD BLD: ABNORMAL
EOSINOPHIL # BLD: 0 K/UL (ref 0–0.4)
EOSINOPHIL NFR BLD: 0 % (ref 0–7)
EPITH CASTS URNS QL MICRO: NORMAL /LPF
ERYTHROCYTE [DISTWIDTH] IN BLOOD BY AUTOMATED COUNT: 16.4 % (ref 11.5–14.5)
GLOBULIN SER CALC-MCNC: 4 G/DL (ref 2–4)
GLUCOSE SERPL-MCNC: 110 MG/DL (ref 65–100)
GLUCOSE UR STRIP.AUTO-MCNC: NEGATIVE MG/DL
HCT VFR BLD AUTO: 46.1 % (ref 35–47)
HGB BLD-MCNC: 15.2 G/DL (ref 11.5–16)
HGB UR QL STRIP: NEGATIVE
HYALINE CASTS URNS QL MICRO: NORMAL /LPF (ref 0–5)
IMM GRANULOCYTES # BLD AUTO: 0 K/UL (ref 0–0.04)
IMM GRANULOCYTES NFR BLD AUTO: 0 % (ref 0–0.5)
KETONES UR QL STRIP.AUTO: NEGATIVE MG/DL
LEUKOCYTE ESTERASE UR QL STRIP.AUTO: NEGATIVE
LIPASE SERPL-CCNC: 116 U/L (ref 73–393)
LYMPHOCYTES # BLD: 1.1 K/UL (ref 0.8–3.5)
LYMPHOCYTES NFR BLD: 15 % (ref 12–49)
MCH RBC QN AUTO: 25.5 PG (ref 26–34)
MCHC RBC AUTO-ENTMCNC: 33 G/DL (ref 30–36.5)
MCV RBC AUTO: 77.2 FL (ref 80–99)
MONOCYTES # BLD: 0.3 K/UL (ref 0–1)
MONOCYTES NFR BLD: 4 % (ref 5–13)
NEUTS SEG # BLD: 6.2 K/UL (ref 1.8–8)
NEUTS SEG NFR BLD: 81 % (ref 32–75)
NITRITE UR QL STRIP.AUTO: NEGATIVE
NRBC # BLD: 0 K/UL (ref 0–0.01)
NRBC BLD-RTO: 0 PER 100 WBC
PH UR STRIP: 7 [PH] (ref 5–8)
PLATELET # BLD AUTO: 298 K/UL (ref 150–400)
PMV BLD AUTO: 10.2 FL (ref 8.9–12.9)
POTASSIUM SERPL-SCNC: 3.6 MMOL/L (ref 3.5–5.1)
PROT SERPL-MCNC: 8.5 G/DL (ref 6.4–8.2)
PROT UR STRIP-MCNC: NEGATIVE MG/DL
RBC # BLD AUTO: 5.97 M/UL (ref 3.8–5.2)
RBC #/AREA URNS HPF: NORMAL /HPF (ref 0–5)
SODIUM SERPL-SCNC: 139 MMOL/L (ref 136–145)
SP GR UR REFRACTOMETRY: 1.01 (ref 1–1.03)
UA: UC IF INDICATED,UAUC: NORMAL
UROBILINOGEN UR QL STRIP.AUTO: 0.2 EU/DL (ref 0.2–1)
WBC # BLD AUTO: 7.7 K/UL (ref 3.6–11)
WBC URNS QL MICRO: NORMAL /HPF (ref 0–4)

## 2019-09-28 PROCEDURE — 83690 ASSAY OF LIPASE: CPT

## 2019-09-28 PROCEDURE — 36415 COLL VENOUS BLD VENIPUNCTURE: CPT

## 2019-09-28 PROCEDURE — 80053 COMPREHEN METABOLIC PANEL: CPT

## 2019-09-28 PROCEDURE — 85025 COMPLETE CBC W/AUTO DIFF WBC: CPT

## 2019-09-28 PROCEDURE — 99284 EMERGENCY DEPT VISIT MOD MDM: CPT

## 2019-09-28 PROCEDURE — 81001 URINALYSIS AUTO W/SCOPE: CPT

## 2019-09-28 PROCEDURE — 74011250636 HC RX REV CODE- 250/636: Performed by: EMERGENCY MEDICINE

## 2019-09-28 PROCEDURE — 96365 THER/PROPH/DIAG IV INF INIT: CPT

## 2019-09-28 PROCEDURE — 74011636320 HC RX REV CODE- 636/320: Performed by: EMERGENCY MEDICINE

## 2019-09-28 PROCEDURE — 96375 TX/PRO/DX INJ NEW DRUG ADDON: CPT

## 2019-09-28 PROCEDURE — 74177 CT ABD & PELVIS W/CONTRAST: CPT

## 2019-09-28 PROCEDURE — 96366 THER/PROPH/DIAG IV INF ADDON: CPT

## 2019-09-28 PROCEDURE — 96367 TX/PROPH/DG ADDL SEQ IV INF: CPT

## 2019-09-28 RX ORDER — MORPHINE SULFATE 2 MG/ML
4 INJECTION, SOLUTION INTRAMUSCULAR; INTRAVENOUS ONCE
Status: DISCONTINUED | OUTPATIENT
Start: 2019-09-28 | End: 2019-09-28 | Stop reason: HOSPADM

## 2019-09-28 RX ORDER — MORPHINE SULFATE 2 MG/ML
4 INJECTION, SOLUTION INTRAMUSCULAR; INTRAVENOUS
Status: DISCONTINUED | OUTPATIENT
Start: 2019-09-28 | End: 2019-09-28 | Stop reason: HOSPADM

## 2019-09-28 RX ORDER — CIPROFLOXACIN 500 MG/1
500 TABLET ORAL 2 TIMES DAILY
Qty: 20 TAB | Refills: 0 | Status: SHIPPED | OUTPATIENT
Start: 2019-09-28 | End: 2019-10-08

## 2019-09-28 RX ORDER — METRONIDAZOLE 500 MG/100ML
500 INJECTION, SOLUTION INTRAVENOUS
Status: COMPLETED | OUTPATIENT
Start: 2019-09-28 | End: 2019-09-28

## 2019-09-28 RX ORDER — LEVOFLOXACIN 5 MG/ML
750 INJECTION, SOLUTION INTRAVENOUS
Status: COMPLETED | OUTPATIENT
Start: 2019-09-28 | End: 2019-09-28

## 2019-09-28 RX ORDER — CIPROFLOXACIN 500 MG/1
500 TABLET ORAL 2 TIMES DAILY
Qty: 14 TAB | Refills: 0 | Status: SHIPPED | OUTPATIENT
Start: 2019-09-28 | End: 2019-09-28 | Stop reason: SDUPTHER

## 2019-09-28 RX ORDER — KETOROLAC TROMETHAMINE 30 MG/ML
15 INJECTION, SOLUTION INTRAMUSCULAR; INTRAVENOUS ONCE
Status: COMPLETED | OUTPATIENT
Start: 2019-09-28 | End: 2019-09-28

## 2019-09-28 RX ORDER — ONDANSETRON 4 MG/1
4 TABLET, ORALLY DISINTEGRATING ORAL
Qty: 21 TAB | Refills: 0 | Status: SHIPPED | OUTPATIENT
Start: 2019-09-28 | End: 2019-10-05

## 2019-09-28 RX ORDER — METRONIDAZOLE 500 MG/1
500 TABLET ORAL 3 TIMES DAILY
Qty: 30 TAB | Refills: 0 | Status: SHIPPED | OUTPATIENT
Start: 2019-09-28 | End: 2019-10-08

## 2019-09-28 RX ORDER — SODIUM CHLORIDE 0.9 % (FLUSH) 0.9 %
10 SYRINGE (ML) INJECTION
Status: COMPLETED | OUTPATIENT
Start: 2019-09-28 | End: 2019-09-28

## 2019-09-28 RX ORDER — ONDANSETRON 2 MG/ML
4 INJECTION INTRAMUSCULAR; INTRAVENOUS
Status: COMPLETED | OUTPATIENT
Start: 2019-09-28 | End: 2019-09-28

## 2019-09-28 RX ADMIN — KETOROLAC TROMETHAMINE 15 MG: 30 INJECTION, SOLUTION INTRAMUSCULAR at 07:38

## 2019-09-28 RX ADMIN — SODIUM CHLORIDE 1000 ML: 900 INJECTION, SOLUTION INTRAVENOUS at 07:56

## 2019-09-28 RX ADMIN — METRONIDAZOLE 500 MG: 500 INJECTION, SOLUTION INTRAVENOUS at 11:23

## 2019-09-28 RX ADMIN — IOPAMIDOL 100 ML: 755 INJECTION, SOLUTION INTRAVENOUS at 08:56

## 2019-09-28 RX ADMIN — ONDANSETRON 4 MG: 2 INJECTION INTRAMUSCULAR; INTRAVENOUS at 07:56

## 2019-09-28 RX ADMIN — Medication 10 ML: at 08:57

## 2019-09-28 RX ADMIN — LEVOFLOXACIN 750 MG: 5 INJECTION, SOLUTION INTRAVENOUS at 09:47

## 2019-09-28 NOTE — LETTER
NOTIFICATION RETURN TO WORK / SCHOOL 
 
9/28/2019 12:42 PM 
 
Ms. Mary Malik [address] [phone] To Whom It May Concern: 
 
Mary Malik is currently under the care of [facility] EMERGENCY DEPT. She will return to work/school on: 9/30/19 Mary Malik may return to work/school with the following restrictions: No restrictions. If there are questions or concerns please have the patient contact our office. Sincerely, Goran Cordero MD

## 2019-09-28 NOTE — ED NOTES
Discharge instructions provided to patient by Dr. Malachi Maldonado. Patient verbalized understanding. IV lock removed prior to discharge. NAD. Ambulated with steady gait out of ED.

## 2019-09-28 NOTE — ED NOTES
Bedside and Verbal shift change report given to Poli Crowe, RN and Patria Petit RN (oncoming nurse) by Danilo Weber RN (offgoing nurse). Report included the following information SBAR, ED Summary, MAR and Recent Results.

## 2019-09-28 NOTE — ED PROVIDER NOTES
EMERGENCY DEPARTMENT HISTORY AND PHYSICAL EXAM      Date: 9/28/2019  Patient Name: Amber Berger  Patient Age and Sex: 59 y.o. female     History of Presenting Illness     Chief Complaint   Patient presents with    Abdominal Pain     N/V since 0100 with chills       History Provided By: Patient    HPI: Amber Berger  Is a pleasant 70-year-old female with past medical history of COPD, hypertension, GERD presenting today with abdominal discomfort. Patient reports that her abdominal pain woke her up from sleep. She reports abdominal cramping across her entire abdomen as a severe and nonradiating. She also has had loose stool and has vomited 2 episodes of nonbloody nonbilious emesis. No fevers, no dysuria. Patient has been in her usual state of health for the past several days. There are no other complaints, changes, or physical findings at this time. PCP: Nacho Rose NP    No current facility-administered medications on file prior to encounter. Current Outpatient Medications on File Prior to Encounter   Medication Sig Dispense Refill    amLODIPine (NORVASC) 10 mg tablet Take 1 Tab by mouth daily. 90 Tab 3    valsartan (DIOVAN) 160 mg tablet Take 1 Tab by mouth daily. 90 Tab 3    varicella-zoster recombinant, PF, (SHINGRIX, PF,) 50 mcg/0.5 mL susr injection Pharmacy to administer 0.5 mL.  0.5 mL 1       Past History     Past Medical History:  Past Medical History:   Diagnosis Date    Chronic leg pain 11/3/2010    fibromyalgia    COPD (chronic obstructive pulmonary disease) ([facility] [address] 9/18/2009    GERD (gastroesophageal reflux disease)     History of kidney cancer     Hypertension 9/18/2009    Leukopenia 9/18/2009    Other ill-defined conditions(799.89)     fibromyalgia    Vitamin D deficiency 7/7/2010       Past Surgical History:  Past Surgical History:   Procedure Laterality Date    HX GYN      hysterectomy    HX HYSTERECTOMY      HX OOPHORECTOMY      HX ORTHOPAEDIC  06/07/2019 plantars wart removal        Family History:  Family History   Problem Relation Age of Onset    Diabetes Mother     Alcohol abuse Father     Cancer Sister         breast    Breast Cancer Sister         onset; around 54    Cancer Brother         breast    Breast Cancer Brother         onset: around 36    No Known Problems Sister     No Known Problems Brother        Social History:  Social History     Tobacco Use    Smoking status: Current Every Day Smoker     Packs/day: 0.50    Smokeless tobacco: Never Used   Substance Use Topics    Alcohol use: Yes     Comment: occasionally    Drug use: No       Allergies: Allergies   Allergen Reactions    Lisinopril Cough     Other reaction(s): Adverse reaction to substance     Lipitor [Atorvastatin] Vertigo         Review of Systems   Constitutional: No  fever,  No  headache  Skin: No  rash, No  jaundice  HEENT: No  nasal congestion, No  eye drainage. Resp: No cough,  No  wheezing  CV: No chest pain, No  palpitations  GI: + vomiting,  +  diarrhea.,  No  constipation  : No dysuria,  No  hematuria  MSK: No joint pain,  No  trauma  Neuro: No numbness, No  tingling  Psych: No suicidal, No  paranoid      Physical Exam     Patient Vitals for the past 12 hrs:   Temp Pulse Resp BP SpO2   09/28/19 1249    150/87 100 %   09/28/19 0951  70 16 177/89 98 %   09/28/19 0815    181/87 97 %   09/28/19 0716   16 (!) 196/110 100 %   09/28/19 0651 97.6 °F (36.4 °C) 84 20 175/89 100 %     General: alert, mild distress, cannot get comfortable  Eyes: EOMI, normal conjunctiva  ENT: moist mucous membranes. Neck: Active, full ROM of neck. Skin: No rashes. no jaundice              Lungs: Equal chest expansion. no respiratory distress. clear to auscultation bilaterally No accessory muscle usage  Heart: regular rate     no peripheral edema   2+ radial pulses and DPs bilaterally  Abd:  non distended soft, Generalized tenderness. No rebound tenderness. No guarding  Back: Full ROM  MSK: Full, active ROM in all 4 extremities. Neuro: Person, Place, Time and Situation; normal speech;   Psych: Cooperative with exam; Appropriate mood and affect             Diagnostic Study Results     Labs -     Recent Results (from the past 12 hour(s))   CBC WITH AUTOMATED DIFF    Collection Time: 09/28/19  7:11 AM   Result Value Ref Range    WBC 7.7 3.6 - 11.0 K/uL    RBC 5.97 (H) 3.80 - 5.20 M/uL    HGB 15.2 11.5 - 16.0 g/dL    HCT 46.1 35.0 - 47.0 %    MCV 77.2 (L) 80.0 - 99.0 FL    MCH 25.5 (L) 26.0 - 34.0 PG    MCHC 33.0 30.0 - 36.5 g/dL    RDW 16.4 (H) 11.5 - 14.5 %    PLATELET 364 266 - 390 K/uL    MPV 10.2 8.9 - 12.9 FL    NRBC 0.0 0  WBC    ABSOLUTE NRBC 0.00 0.00 - 0.01 K/uL    NEUTROPHILS 81 (H) 32 - 75 %    LYMPHOCYTES 15 12 - 49 %    MONOCYTES 4 (L) 5 - 13 %    EOSINOPHILS 0 0 - 7 %    BASOPHILS 0 0 - 1 %    IMMATURE GRANULOCYTES 0 0.0 - 0.5 %    ABS. NEUTROPHILS 6.2 1.8 - 8.0 K/UL    ABS. LYMPHOCYTES 1.1 0.8 - 3.5 K/UL    ABS. MONOCYTES 0.3 0.0 - 1.0 K/UL    ABS. EOSINOPHILS 0.0 0.0 - 0.4 K/UL    ABS. BASOPHILS 0.0 0.0 - 0.1 K/UL    ABS. IMM. GRANS. 0.0 0.00 - 0.04 K/UL    DF AUTOMATED     METABOLIC PANEL, COMPREHENSIVE    Collection Time: 09/28/19  7:11 AM   Result Value Ref Range    Sodium 139 136 - 145 mmol/L    Potassium 3.6 3.5 - 5.1 mmol/L    Chloride 106 97 - 108 mmol/L    CO2 30 21 - 32 mmol/L    Anion gap 3 (L) 5 - 15 mmol/L    Glucose 110 (H) 65 - 100 mg/dL    BUN 14 6 - 20 MG/DL    Creatinine 0.88 0.55 - 1.02 MG/DL    BUN/Creatinine ratio 16 12 - 20      GFR est AA >60 >60 ml/min/1.73m2    GFR est non-AA >60 >60 ml/min/1.73m2    Calcium 8.7 8.5 - 10.1 MG/DL    Bilirubin, total 0.5 0.2 - 1.0 MG/DL    ALT (SGPT) 25 12 - 78 U/L    AST (SGOT) 21 15 - 37 U/L    Alk.  phosphatase 86 45 - 117 U/L    Protein, total 8.5 (H) 6.4 - 8.2 g/dL    Albumin 4.5 3.5 - 5.0 g/dL    Globulin 4.0 2.0 - 4.0 g/dL    A-G Ratio 1.1 1.1 - 2.2     LIPASE    Collection Time: 09/28/19  7:11 AM   Result Value Ref Range    Lipase 116 73 - 393 U/L   URINALYSIS W/ REFLEX CULTURE    Collection Time: 09/28/19  9:12 AM   Result Value Ref Range    Color YELLOW/STRAW      Appearance CLEAR CLEAR      Specific gravity 1.015 1.003 - 1.030      pH (UA) 7.0 5.0 - 8.0      Protein NEGATIVE  NEG mg/dL    Glucose NEGATIVE  NEG mg/dL    Ketone NEGATIVE  NEG mg/dL    Bilirubin NEGATIVE  NEG      Blood NEGATIVE  NEG      Urobilinogen 0.2 0.2 - 1.0 EU/dL    Nitrites NEGATIVE  NEG      Leukocyte Esterase NEGATIVE  NEG      WBC 0-4 0 - 4 /hpf    RBC 0-5 0 - 5 /hpf    Epithelial cells FEW FEW /lpf    Bacteria NEGATIVE  NEG /hpf    UA:UC IF INDICATED CULTURE NOT INDICATED BY UA RESULT CNI      Hyaline cast 0-2 0 - 5 /lpf       Radiologic Studies -   CT ABD PELV W CONT   Final Result   IMPRESSION:   1. Colonic wall thickening involving the ascending colon and transverse colon. Small amount of fluid in the pelvis. These findings suggest colitis possibly   infectious or inflammatory. 2.  There is focal mild small bowel dilatation of the ileum with thickening of   the terminal ileum consistent with low-grade partial small bowel obstruction. .   Question history of inflammatory bowel disease. 3.   Distended urinary bladder        CT Results  (Last 48 hours)               09/28/19 0857  CT ABD PELV W CONT Final result    Impression:  IMPRESSION:   1. Colonic wall thickening involving the ascending colon and transverse colon. Small amount of fluid in the pelvis. These findings suggest colitis possibly   infectious or inflammatory. 2.  There is focal mild small bowel dilatation of the ileum with thickening of   the terminal ileum consistent with low-grade partial small bowel obstruction. .   Question history of inflammatory bowel disease. 3.   Distended urinary bladder       Narrative:  EXAM: CT ABD PELV W CONT       INDICATION: Diverticulitis       COMPARISON: 11/2/2017        CONTRAST: 100 mL of Isovue-370.        TECHNIQUE: Following the uneventful intravenous administration of contrast, thin axial   images were obtained through the abdomen and pelvis. Coronal and sagittal   reconstructions were generated. Oral contrast was not administered. CT dose   reduction was achieved through use of a standardized protocol tailored for this   examination and automatic exposure control for dose modulation. FINDINGS:    LUNG BASES: Clear. INCIDENTALLY IMAGED HEART AND MEDIASTINUM: Unremarkable. LIVER: No mass or biliary dilatation. GALLBLADDER: Unremarkable. SPLEEN: No mass. PANCREAS: No mass or ductal dilatation. ADRENALS: Unremarkable. KIDNEYS: No mass, calculus, or hydronephrosis. STOMACH: Unremarkable. SMALL BOWEL: The distal small bowel is mildly dilated measuring up to 3.2 cm. There is thickening of the terminal ileum. COLON: There is colonic wall thickening involving the ascending colon and   transverse colon. The distal colon is relatively decompressed. APPENDIX: Not definitely visualized. PERITONEUM: Small amount of fluid is noted in the pelvis. RETROPERITONEUM: No lymphadenopathy or aortic aneurysm. REPRODUCTIVE ORGANS: Not visualized   URINARY BLADDER: Urinary bladder is distended. BONES: No destructive bone lesion. ADDITIONAL COMMENTS: N/A               CXR Results  (Last 48 hours)    None            Medical Decision Making     Differential Diagnosis: Colitis, pancreatitis, appendicitis, viral gastroenteritis    I reviewed the vital signs, available nursing notes, past medical history, past surgical history, family history and social history and old medical records.   On my interpretation, Laboratory workup is significant for unremarkable CBC, electrolytes are largely unremarkable, lipase is negative, urinalysis without evidence of infection  On my interpretation of the radiology studies CT reveals evidence of colonic wall thickening in the ascending in the transverse colon with focal small bowel dilatation near the ileum    Management/ED course: Patient presents today with abdominal discomfort, vomiting, and loose stool. She was found to have evidence of colitis on her CT scan. The patient is afebrile with unremarkable vital signs. She was able to have pain well controlled with IV analgesics. She did not have continued vomiting and was able to tolerate p.o. in the emergency department. She was treated with Levaquin and Flagyl and will be discharged with a prescription for Flagyl and Cipro Floxin. I also provided her with ondansetron. Patient was given return precautions and was discharged with information for GI follow-up. She was comfortable with this plan. Dispo: Discharged. The patient has been re-evaluated and is ready for discharge. Reviewed available results with patient. Counseled patient on diagnosis and care plan. Patient has expressed understanding, and all questions have been answered. Patient agrees with plan and agrees to follow up as recommended, or to return to the ED if their symptoms worsen. Discharge instructions have been provided and explained to the patient, along with reasons to return to the ED. PLAN:  Discharge Medication List as of 9/28/2019 12:33 PM      START taking these medications    Details   metroNIDAZOLE (FLAGYL) 500 mg tablet Take 1 Tab by mouth three (3) times daily for 10 days. , Normal, Disp-30 Tab, R-0      ondansetron (ZOFRAN ODT) 4 mg disintegrating tablet Take 1 Tab by mouth every eight (8) hours as needed for Nausea for up to 7 days. , Normal, Disp-21 Tab, R-0         CONTINUE these medications which have CHANGED    Details   ciprofloxacin HCl (CIPRO) 500 mg tablet Take 1 Tab by mouth two (2) times a day for 10 days. , Normal, Disp-20 Tab, R-0         CONTINUE these medications which have NOT CHANGED    Details   amLODIPine (NORVASC) 10 mg tablet Take 1 Tab by mouth daily. , NormalTo replace combo tab until off backorder. Disp-90 Tab, R-3 valsartan (DIOVAN) 160 mg tablet Take 1 Tab by mouth daily. , NormalTo replace combo tab until off backorder. Disp-90 Tab, R-3      varicella-zoster recombinant, PF, (SHINGRIX, PF,) 50 mcg/0.5 mL susr injection Pharmacy to administer 0.5 mL., Normal, Disp-0.5 mL, R-1           2. Follow-up Information     Follow up With Specialties Details Why Contact Info    Lisbeth Nava NP Nurse Practitioner  As needed [facility]  [address]      Beryle Simpler., MD Gastroenterology Call  As needed [address]  [phone]          3. Return to ED if worse     Diagnosis     Clinical Impression:   1. Noninfectious gastroenteritis, unspecified type    2.  Vomiting, intractability of vomiting not specified, presence of nausea not specified, unspecified vomiting type        Attestations:    Jacqueline Kruse MD

## 2019-09-30 ENCOUNTER — PATIENT OUTREACH (OUTPATIENT)
Dept: OTHER | Age: 64
End: 2019-09-30

## 2019-09-30 NOTE — PROGRESS NOTES
Initial HPRP:   Patient on report as discharged from [facility] ED Visit 9/28/19 for Noninfectious Gastroenteritis. Initial attempt to contact patient for transitions of care.  Left discreet message on voicemail with this Care Coordinator's contact information.  Will attempt outreach on 10/1/19.        ciprofloxacin HCl 500 mg tablet  metroNIDAZOLE 500 mg tablet  ondansetron 4 mg disintegrating tablet      Call 911 anytime you think you may need emergency care. For example, call if:   You passed out (lost consciousness).  Your stools are maroon or very bloody. Call your doctor now or seek immediate medical care if:  You have new or worse belly pain.  You have a fever.  You are vomiting.  You cannot pass stools or gas.  You have new or more blood in your stools.  You have new or worse symptoms.  You are losing weight.  You do not get better as expected.

## 2019-10-01 ENCOUNTER — PATIENT OUTREACH (OUTPATIENT)
Dept: OTHER | Age: 64
End: 2019-10-01

## 2019-10-01 NOTE — LETTER
10/1/2019 10:30 AM 
 
Ms. Orly Robles [address] [place]  [phone] Dear Orly Travis My name is Idris Damian, Employee Care Coordinator for [facility] and I have been trying to reach you. The Employee Care Management [facility]) program is a free-of-charge confidential service provided to our employees and their family members covered by the [facility]. The program will provide an employee and his/her family with the [facility] expertise to assist in navigating the health care delivery system, provider services, and their overall care needsso as to assure and improve health care interactions and enhance the quality of life. This program is designed to provide you with the opportunity to have a [facility] care manager partner with you for the following services: 
 
 1) when you come home from the hospital or emergency room 2) when help is needed to manage your disease 3) when you need assistance coordinating services or appointments [facility] now partners with [facility]. If you are a qualifying employee, you may receive an additional 10 wellness incentive points for every month of active participation with an Employee Care Manager. [facility] is dedicated to empowering the good health of its community and improving the quality of care and care experiences for employees and their families. We are committed to safeguarding patient confidentiality and privacy, assuring that every employee has the respect he or she deserves in managing their health. The information shared with your care manager will not be shared with anyone else aside from those you identify as part of your care team, and will only be used to assist you with any identified care needs. Please contact me if you would like this service provided to you. Sincerely, 
 
 
Mitchell Akbar LPN  [employer] [facility] Care Coordinator [facility] AT [address], [address] [facility]  [address] C [phone]  F [phone]  [email or web address]

## 2019-10-01 NOTE — PROGRESS NOTES
Patient identified as eligible for [address] [place] [address] services. Second telephone outreach attempted. Left discreet voicemail with this CM confidential contact information. Will send UTR letter via [address] Next Outreach 10/16/19 f/u - Noninfectious Gastroenteritis .

## 2019-10-10 ENCOUNTER — TELEPHONE (OUTPATIENT)
Dept: INTERNAL MEDICINE CLINIC | Age: 64
End: 2019-10-10

## 2019-10-10 NOTE — TELEPHONE ENCOUNTER
[facility] pharmacy states valsartan 160 mg tablet is on back order due to extra recalls. They do have losartan in stock.   [facility] pharmacy # [phone]

## 2019-10-16 ENCOUNTER — PATIENT OUTREACH (OUTPATIENT)
Dept: OTHER | Age: 64
End: 2019-10-16

## 2019-10-16 RX ORDER — VALSARTAN 80 MG/1
160 TABLET ORAL DAILY
Qty: 60 TAB | Refills: 1 | Status: SHIPPED | OUTPATIENT
Start: 2019-10-16 | End: 2020-10-02 | Stop reason: SDUPTHER

## 2019-10-16 NOTE — PROGRESS NOTES
HPRP f/u:  Telephone attempt to contact patient for Health Promotion and Risk Prevention. Left discreet message on voicemail with this CC contact information. Will follow for one month for transitions of care needs. Next outreach is 10/31/19 for discussion f/u - Noninfectious Gastroenteritis  and Resolve Episode.

## 2019-10-31 ENCOUNTER — PATIENT OUTREACH (OUTPATIENT)
Dept: OTHER | Age: 64
End: 2019-10-31

## 2020-01-24 ENCOUNTER — OFFICE VISIT (OUTPATIENT)
Dept: PRIMARY CARE CLINIC | Age: 65
End: 2020-01-24

## 2020-01-24 VITALS
HEIGHT: 59 IN | OXYGEN SATURATION: 99 % | HEART RATE: 81 BPM | TEMPERATURE: 98 F | RESPIRATION RATE: 16 BRPM | SYSTOLIC BLOOD PRESSURE: 160 MMHG | WEIGHT: 89 LBS | BODY MASS INDEX: 17.94 KG/M2 | DIASTOLIC BLOOD PRESSURE: 69 MMHG

## 2020-01-24 DIAGNOSIS — R05.9 COUGH: Primary | ICD-10-CM

## 2020-01-24 DIAGNOSIS — J06.9 UPPER RESPIRATORY TRACT INFECTION, UNSPECIFIED TYPE: ICD-10-CM

## 2020-01-24 RX ORDER — BENZONATATE 200 MG/1
200 CAPSULE ORAL
Qty: 21 CAP | Refills: 0 | Status: SHIPPED | OUTPATIENT
Start: 2020-01-24 | End: 2020-01-31

## 2020-01-24 RX ORDER — AZITHROMYCIN 250 MG/1
250 TABLET, FILM COATED ORAL SEE ADMIN INSTRUCTIONS
Qty: 6 TAB | Refills: 0 | Status: SHIPPED | OUTPATIENT
Start: 2020-01-24 | End: 2020-01-29

## 2020-01-24 NOTE — PROGRESS NOTES
RM1    Chief Complaint   Patient presents with    Cough     chest congestion, headache x 2 weeks       Visit Vitals  /69 (BP 1 Location: Left arm, BP Patient Position: Sitting)   Pulse 81   Temp 98 °F (36.7 °C) (Oral)   Resp 16   Ht 4' 11\" (1.499 m)   Wt 89 lb (40.4 kg)   SpO2 99%   BMI 17.98 kg/m²

## 2020-01-27 NOTE — PROGRESS NOTES
Subjective:   Lien Doe is a 62 y.o. female who complains of congestion and productive cough for 15 days, gradually worsening since that time. She denies a history of shortness of breath and wheezing. Evaluation to date: none. Treatment to date: OTC products. Patient does not smoke cigarettes. Relevant PMH: No pertinent additional PMH. Patient Active Problem List   Diagnosis Code    Hypertension I10    COPD (chronic obstructive pulmonary disease) ([facility] [address] J44.9    Leukopenia D72.819    Vitamin D deficiency E55.9    Chronic leg pain M79.606, G89.29    Fibromyalgia, primary M79.7    Left renal mass N28.89    Regular astigmatism H52.229    Nuclear cataract [id number]    Cigarette nicotine dependence without complication V02.498    BMI less than 19,adult Z68.1     Patient Active Problem List    Diagnosis Date Noted    Cigarette nicotine dependence without complication 51/23/7392    BMI less than 19,adult 07/12/2018    Regular astigmatism 06/28/2016    Nuclear cataract 06/28/2016    Left renal mass 06/11/2014    Fibromyalgia, primary 03/08/2012    Chronic leg pain 11/03/2010    Vitamin D deficiency 07/07/2010    Hypertension 09/18/2009    COPD (chronic obstructive pulmonary disease) ([facility] [address] 09/18/2009    Leukopenia 09/18/2009     Current Outpatient Medications   Medication Sig Dispense Refill    benzonatate (TESSALON) 200 mg capsule Take 1 Cap by mouth three (3) times daily as needed for Cough for up to 7 days. 21 Cap 0    azithromycin (ZITHROMAX) 250 mg tablet Take 1 Tab by mouth See Admin Instructions for 5 days. 6 Tab 0    valsartan (DIOVAN) 80 mg tablet Take 2 Tabs by mouth daily. 60 Tab 1    amLODIPine (NORVASC) 10 mg tablet Take 1 Tab by mouth daily. 90 Tab 3    varicella-zoster recombinant, PF, (SHINGRIX, PF,) 50 mcg/0.5 mL susr injection Pharmacy to administer 0.5 mL. 0.5 mL 1     Allergies   Allergen Reactions    Lisinopril Cough     Other reaction(s):  Adverse reaction to substance     Lipitor [Atorvastatin] Vertigo     Past Medical History:   Diagnosis Date    Chronic leg pain 11/3/2010    fibromyalgia    COPD (chronic obstructive pulmonary disease) ([facility] [address] 9/18/2009    GERD (gastroesophageal reflux disease)     History of kidney cancer     Hypertension 9/18/2009    Leukopenia 9/18/2009    Other ill-defined conditions(799.89)     fibromyalgia    Vitamin D deficiency 7/7/2010     Past Surgical History:   Procedure Laterality Date    HX GYN      hysterectomy    HX HYSTERECTOMY      HX OOPHORECTOMY      HX ORTHOPAEDIC  06/07/2019    plantars wart removal      Family History   Problem Relation Age of Onset    Diabetes Mother     Alcohol abuse Father     Cancer Sister         breast    Breast Cancer Sister         onset; around 54    Cancer Brother         breast    Breast Cancer Brother         onset: around 36    No Known Problems Sister     No Known Problems Brother      Social History     Tobacco Use    Smoking status: Current Every Day Smoker     Packs/day: 0.50    Smokeless tobacco: Never Used   Substance Use Topics    Alcohol use: Yes     Comment: occasionally        Review of Systems  Pertinent items are noted in HPI. Objective:     Visit Vitals  /69 (BP 1 Location: Left arm, BP Patient Position: Sitting)   Pulse 81   Temp 98 °F (36.7 °C) (Oral)   Resp 16   Ht 4' 11\" (1.499 m)   Wt 89 lb (40.4 kg)   SpO2 99%   BMI 17.98 kg/m²     General:  alert, cooperative, no distress   Eyes: conjunctivae/corneas clear. PERRL, EOM's intact. Fundi benign   Ears: normal TM's and external ear canals AU   Sinuses: Normal paranasal sinuses without tenderness   Mouth:  Lips, mucosa, and tongue normal. Teeth and gums normal   Neck: supple, symmetrical, trachea midline and no adenopathy. Heart: S1 and S2 normal, no murmurs noted. Lungs: clear to auscultation bilaterally   Abdomen: soft, non-tender.  Bowel sounds normal. No masses,  no organomegaly Assessment/Plan:   viral upper respiratory illness  Suggested symptomatic OTC remedies. RTC prn. Discussed diagnosis and treatment of viral URIs. ICD-10-CM ICD-9-CM    1. Cough R05 786.2 XR CHEST PA LAT   2. Upper respiratory tract infection, unspecified type J06.9 465.9    .

## 2020-08-06 ENCOUNTER — EMPLOYEE WELLNESS (OUTPATIENT)
Dept: FAMILY MEDICINE CLINIC | Age: 65
End: 2020-08-06

## 2020-08-06 LAB
CHOLEST SERPL-MCNC: 174 MG/DL
GLUCOSE SERPL-MCNC: 87 MG/DL (ref 65–100)
HDLC SERPL-MCNC: 68 MG/DL
LDLC SERPL CALC-MCNC: 95.2 MG/DL (ref 0–100)
TRIGL SERPL-MCNC: 54 MG/DL (ref ?–150)

## 2020-08-31 ENCOUNTER — OFFICE VISIT (OUTPATIENT)
Dept: PRIMARY CARE CLINIC | Age: 65
End: 2020-08-31
Payer: COMMERCIAL

## 2020-08-31 VITALS
TEMPERATURE: 97.3 F | OXYGEN SATURATION: 99 % | WEIGHT: 86.6 LBS | BODY MASS INDEX: 17.46 KG/M2 | HEIGHT: 59 IN | DIASTOLIC BLOOD PRESSURE: 93 MMHG | HEART RATE: 84 BPM | RESPIRATION RATE: 16 BRPM | SYSTOLIC BLOOD PRESSURE: 171 MMHG

## 2020-08-31 DIAGNOSIS — R09.81 NASAL CONGESTION: ICD-10-CM

## 2020-08-31 DIAGNOSIS — J06.9 VIRAL URI: Primary | ICD-10-CM

## 2020-08-31 DIAGNOSIS — I10 ELEVATED BLOOD PRESSURE READING IN OFFICE WITH DIAGNOSIS OF HYPERTENSION: ICD-10-CM

## 2020-08-31 PROCEDURE — 99213 OFFICE O/P EST LOW 20 MIN: CPT | Performed by: NURSE PRACTITIONER

## 2020-08-31 RX ORDER — FLUTICASONE PROPIONATE 50 MCG
2 SPRAY, SUSPENSION (ML) NASAL DAILY
Qty: 1 BOTTLE | Refills: 0 | Status: SHIPPED | OUTPATIENT
Start: 2020-08-31 | End: 2021-11-18 | Stop reason: ALTCHOICE

## 2020-08-31 NOTE — PROGRESS NOTES
Chief Complaint   Patient presents with    Headache     Patient in room #4 with complaints of headache with cold symptoms for past three days, 8/10 pain, used OTC medications but not effective

## 2020-08-31 NOTE — PROGRESS NOTES
HISTORY OF PRESENT ILLNESS  Alexei Leblanc is a 62 y.o. female. Patient reports clear runny nose and headache x 3-4 days. Headache is at both temples. No cough or SOB. She has been taking nyquil and tylenol with little relief. No fever, chills, but mild aches. She feels her blood pressure may be elevated due to nyquil. She is taking medications as prescribed. Visit Vitals  BP (!) 171/93   Pulse 84   Temp 97.3 °F (36.3 °C) (Skin)   Resp 16   Ht 4' 11\" (1.499 m)   Wt 86 lb 9.6 oz (39.3 kg)   SpO2 99%   BMI 17.49 kg/m²       HPI    Review of Systems   HENT: Positive for congestion. Neurological: Positive for headaches. Physical Exam  Constitutional:       Appearance: Normal appearance. HENT:      Head: Normocephalic. Right Ear: Tympanic membrane, ear canal and external ear normal.      Left Ear: Tympanic membrane, ear canal and external ear normal.      Nose: Congestion and rhinorrhea present. Comments: Swollen turbinates     Mouth/Throat:      Mouth: Mucous membranes are moist.      Pharynx: Posterior oropharyngeal erythema (mild) present. Cardiovascular:      Rate and Rhythm: Normal rate and regular rhythm. Pulmonary:      Effort: Pulmonary effort is normal.      Breath sounds: Normal breath sounds. Lymphadenopathy:      Cervical: Cervical adenopathy (mild right sided) present. Skin:     General: Skin is warm and dry. Neurological:      General: No focal deficit present. Mental Status: She is alert and oriented to person, place, and time. Psychiatric:         Mood and Affect: Mood normal.         Behavior: Behavior normal.     no sinus pain    ASSESSMENT and PLAN    ICD-10-CM ICD-9-CM    1. Viral URI  J06.9 465.9    2. Nasal congestion  R09.81 478.19    3.  Elevated blood pressure reading in office with diagnosis of hypertension  I10 401.9      Orders Placed This Encounter    fluticasone propionate (FLONASE) 50 mcg/actuation nasal spray   add flonase  Monitor blood pressure at home and follow up with PCP if no improvement over the next few days

## 2020-10-01 ENCOUNTER — OFFICE VISIT (OUTPATIENT)
Dept: PRIMARY CARE CLINIC | Age: 65
End: 2020-10-01
Payer: COMMERCIAL

## 2020-10-01 VITALS
RESPIRATION RATE: 16 BRPM | HEART RATE: 92 BPM | OXYGEN SATURATION: 98 % | BODY MASS INDEX: 17.14 KG/M2 | SYSTOLIC BLOOD PRESSURE: 167 MMHG | TEMPERATURE: 97.3 F | HEIGHT: 59 IN | DIASTOLIC BLOOD PRESSURE: 73 MMHG | WEIGHT: 85 LBS

## 2020-10-01 DIAGNOSIS — F17.200 CURRENT EVERY DAY SMOKER: ICD-10-CM

## 2020-10-01 DIAGNOSIS — I10 ESSENTIAL HYPERTENSION: ICD-10-CM

## 2020-10-01 DIAGNOSIS — R05.3 PERSISTENT COUGH FOR 3 WEEKS OR LONGER: Primary | ICD-10-CM

## 2020-10-01 DIAGNOSIS — Z87.09 HISTORY OF COPD: ICD-10-CM

## 2020-10-01 PROCEDURE — 99213 OFFICE O/P EST LOW 20 MIN: CPT | Performed by: NURSE PRACTITIONER

## 2020-10-01 RX ORDER — ALBUTEROL SULFATE 90 UG/1
1 AEROSOL, METERED RESPIRATORY (INHALATION)
Qty: 1 INHALER | Refills: 0 | Status: SHIPPED | OUTPATIENT
Start: 2020-10-01 | End: 2020-10-15

## 2020-10-01 RX ORDER — LEVOCETIRIZINE DIHYDROCHLORIDE 5 MG/1
5 TABLET, FILM COATED ORAL DAILY
Qty: 30 TAB | Refills: 0 | Status: SHIPPED | OUTPATIENT
Start: 2020-10-01 | End: 2020-10-31

## 2020-10-01 RX ORDER — GUAIFENESIN 1200 MG/1
1200 TABLET, EXTENDED RELEASE ORAL 2 TIMES DAILY
Qty: 14 TAB | Refills: 0 | Status: SHIPPED | OUTPATIENT
Start: 2020-10-01 | End: 2020-10-08

## 2020-10-01 NOTE — PROGRESS NOTES
HISTORY OF PRESENT ILLNESS  Annemarie Form is a 62 y.o. female. Patient with a complaint of cough x 3-4 days. Does have a chronic cough but now Productive with  yellow mucous. Lying down makes worse. Denies SOB, wheezing. No fever, nasal congestion or sore throat. Has been drinking onion soup and hot liquids to loosen. Also took Theraflu. + smoker. Reports 5 cigarettes a day since age 14. Has quit several times. Is currently considering quitting soon. Seen 8/31/2020 for same. States cough resolved. Does have seasonal allergies. Currently not taking anything. Last CXR 1/2020. Does have a pulmonary doctor. Dr. Gustavo Ferrari. [place]. Last seen more than 5 yrs ago. Was also instructed to see PCP regarding elevated blood pressure on last OV. Taking medication as directed Reports last PCP visit 6 months ago. Cough   The history is provided by the patient. This is a recurrent problem. The current episode started 2 days ago. The problem occurs daily. The problem has not changed since onset. Pertinent negatives include no abdominal pain, no headaches and no shortness of breath. Review of Systems   Constitutional: Negative for chills, fever, malaise/fatigue and weight loss. HENT: Negative for sinus pain and sore throat. Respiratory: Positive for cough. Negative for hemoptysis, shortness of breath and wheezing. Cardiovascular: Negative for palpitations. Gastrointestinal: Negative for abdominal pain. Musculoskeletal: Negative for back pain and joint pain. Neurological: Negative for headaches. Physical Exam  Vitals signs and nursing note reviewed. Constitutional:       General: She is not in acute distress. Appearance: Normal appearance. HENT:      Head: Normocephalic and atraumatic. Right Ear: Tympanic membrane normal.      Left Ear: Tympanic membrane normal.      Nose: No congestion or rhinorrhea. Neck:      Musculoskeletal: Normal range of motion and neck supple.  No muscular tenderness. Cardiovascular:      Rate and Rhythm: Normal rate. Pulses: Normal pulses. Pulmonary:      Effort: Pulmonary effort is normal.      Breath sounds: Decreased air movement present. Comments: Poor inspiratory effort. No rales , wheezing or rhonchi. Has not coughed throughout exam.   Lymphadenopathy:      Cervical: No cervical adenopathy. Skin:     General: Skin is warm. Neurological:      General: No focal deficit present. Mental Status: She is alert. ASSESSMENT and PLAN    ICD-10-CM ICD-9-CM    1. Persistent cough for 3 weeks or longer  R05 786.2 REFERRAL TO PULMONARY DISEASE   2. Current every day smoker  F17.200 305.1    3. Essential hypertension  I10 401.9    4. History of COPD  Z87.09 V12.69 REFERRAL TO PULMONARY DISEASE     Encounter Diagnoses   Name Primary?  Persistent cough for 3 weeks or longer Yes    Current every day smoker     Essential hypertension     History of COPD      Orders Placed This Encounter   [facility]    levocetirizine (XYZAL) 5 mg tablet    guaiFENesin (Mucinex) 1,200 mg Ta12 ER tablet    albuterol (PROVENTIL HFA, VENTOLIN HFA, PROAIR HFA) 90 mcg/actuation inhaler   Medication as directed. No evidence of a bacterial infection. Discussed smoking cessation  [facility]commend [facility]  Inhaler as needed. Demonstrated use. Referred to Pulmonary for COPD evaluation  Please see PCP for adjustment of BP medication. I have discussed with the patient the diagnosis  and intended plan as seen in the orders. Patient received AVS , all questions answered concerning all future plans. I have discussed medication side effects and warnings with the patient/ guardian. Patient instructed to go to ER if symptoms worsen or fail to improve. It was a pleasure to see you in the office today. Please call if you have any further questions or concerns. I am available through the portal system.      Signed By: AURORA Soares October 1, 2020

## 2020-10-01 NOTE — PATIENT INSTRUCTIONS
Chronic Cough: Care Instructions Your Care Instructions A cough is your body's response to something that bothers your throat or airways. Many things can cause a cough. You might cough because of a cold or the flu, bronchitis, or asthma. Smoking, postnasal drip, allergies, and stomach acid that backs up into your throat also can cause a cough. A cough can be short-term (acute) or long-term (chronic). A chronic cough lasts more than 3 weeks. A chronic cough is often caused by a long-term problem, such as asthma. Another cause might be a medicine, such as an ACE inhibitor. A cough is a symptom, not a disease. To treat a chronic cough, you may need to treat the problem that causes it. You can take a few steps at home to cough less and feel better. Some people cough or clear their throat out of habit for no clear reason. Follow-up care is a key part of your treatment and safety. Be sure to make and go to all appointments, and call your doctor if you are having problems. It's also a good idea to know your test results and keep a list of the medicines you take. How can you care for yourself at home? · Drink plenty of water and other fluids. This may help soothe a dry or sore throat. Honey or lemon juice in hot water or tea may ease a dry cough. · Prop up your head on pillows to help you breathe and ease a cough. · Do not smoke or allow others to smoke around you. Smoke can make a cough worse. If you need help quitting, talk to your doctor about stop-smoking programs and medicines. These can increase your chances of quitting for good. · Avoid exposure to smoke, dust, or other pollutants, or wear a face mask. Check with your doctor or pharmacist to find out which type of face mask will give you the most benefit. · Take cough medicine as directed by your doctor. · Try cough drops to soothe a dry or sore throat.  Cough drops don't stop a cough. Medicine-flavored cough drops are no better than candy-flavored drops or hard candy. Throat clearing When you have a chronic cough or a disease that may cause this type of cough, you may often feel like you want to clear your throat. This helps bring up mucus. But throat clearing does not always have a cause. Throat clearing can become a habit. The more you do it, the more you feel like you need to do it. But frequent throat clearing can be hard on your vocal cords. It's like slamming them together. To help lessen throat clearing, you can try: · Taking small sips of water. · Not clearing your throat when you feel you need to. · Swallowing hard when you want to clear your throat. You may want to ask your doctor if a medicine that thins mucus would help. When should you call for help? Call 911 anytime you think you may need emergency care. For example, call if: 
  · You have severe trouble breathing. Call your doctor now or seek immediate medical care if: 
  · You cough up blood.  
  · You have new or worse trouble breathing.  
  · You have a new or higher fever. Watch closely for changes in your health, and be sure to contact your doctor if: 
  · You cough more deeply or more often, especially if you notice more mucus or a change in the color of your mucus.  
  · You do not get better as expected. Where can you learn more? Go to http://www.gray.com/ Enter J197 in the search box to learn more about \"Chronic Cough: Care Instructions. \" Current as of: February 24, 2020               Content Version: 12.6 © 6447-3658 [employer], [employer]. Care instructions adapted under license by [employer] (which disclaims liability or warranty for this information).  If you have questions about a medical condition or this instruction, always ask your healthcare professional. [employer] disclaims any warranty or liability for your use of this information. Stopping Smoking: Care Instructions Your Care Instructions Cigarette smokers crave the nicotine in cigarettes. Giving it up is much harder than simply changing a habit. Your body has to stop craving the nicotine. It is hard to quit, but you can do it. There are many tools that people use to quit smoking. You may find that combining tools works best for you. There are several steps to quitting. First you get ready to quit. Then you get support to help you. After that, you learn new skills and behaviors to become a nonsmoker. For many people, a necessary step is getting and using medicine. Your doctor will help you set up the plan that best meets your needs. You may want to attend a smoking cessation program to help you quit smoking. When you choose a program, look for one that has proven success. Ask your doctor for ideas. You will greatly increase your chances of success if you take medicine as well as get counseling or join a cessation program. 
Some of the changes you feel when you first quit tobacco are uncomfortable. Your body will miss the nicotine at first, and you may feel short-tempered and grumpy. You may have trouble sleeping or concentrating. Medicine can help you deal with these symptoms. You may struggle with changing your smoking habits and rituals. The last step is the tricky one: Be prepared for the smoking urge to continue for a time. This is a lot to deal with, but keep at it. You will feel better. Follow-up care is a key part of your treatment and safety. Be sure to make and go to all appointments, and call your doctor if you are having problems. It's also a good idea to know your test results and keep a list of the medicines you take. How can you care for yourself at home? · Ask your family, friends, and coworkers for support. You have a better chance of quitting if you have help and support. · Join a support group, such as Nicotine Anonymous, for people who are trying to quit smoking. · Consider signing up for a smoking cessation program, such as the American Lung Association's Freedom from Smoking program. 
· Get text messaging support. Go to the website at www.smokefree. gov to sign up for the [facility] program. 
· Set a quit date. Pick your date carefully so that it is not right in the middle of a big deadline or stressful time. Once you quit, do not even take a puff. Get rid of all ashtrays and lighters after your last cigarette. Clean your house and your clothes so that they do not smell of smoke. · Learn how to be a nonsmoker. Think about ways you can avoid those things that make you reach for a cigarette. ? Avoid situations that put you at greatest risk for smoking. For some people, it is hard to have a drink with friends without smoking. For others, they might skip a coffee break with coworkers who smoke. ? Change your daily routine. Take a different route to work or eat a meal in a different place. · Cut down on stress. Calm yourself or release tension by doing an activity you enjoy, such as reading a book, taking a hot bath, or gardening. · Talk to your doctor or pharmacist about nicotine replacement therapy, which replaces the nicotine in your body. You still get nicotine but you do not use tobacco. Nicotine replacement products help you slowly reduce the amount of nicotine you need. These products come in several forms, many of them available over-the-counter: ? Nicotine patches ? Nicotine gum and lozenges ? Nicotine inhaler · Ask your doctor about bupropion (Wellbutrin) or varenicline (Chantix), which are prescription medicines. They do not contain nicotine. They help you by reducing withdrawal symptoms, such as stress and anxiety. · Some people find hypnosis, acupuncture, and massage helpful for ending the smoking habit. · Eat a healthy diet and get regular exercise. Having healthy habits will help your body move past its craving for nicotine. · Be prepared to keep trying. Most people are not successful the first few times they try to quit. Do not get mad at yourself if you smoke again. Make a list of things you learned and think about when you want to try again, such as next week, next month, or next year. Where can you learn more? Go to http://www.gray.com/ Enter W731 in the search box to learn more about \"Stopping Smoking: Care Instructions. \" Current as of: March 12, 2020               Content Version: 12.6 © 5852-0809 [employer]. Care instructions adapted under license by [employer] (which disclaims liability or warranty for this information). If you have questions about a medical condition or this instruction, always ask your healthcare professional. [place] [address] any warranty or liability for your use of this information.

## 2020-10-01 NOTE — PROGRESS NOTES
RM 4    Chief Complaint   Patient presents with    Cough     productive cough , x 2 -3 days ago       Visit Vitals  BP (!) 167/73 (BP 1 Location: Left arm, BP Patient Position: Sitting)   Pulse 92   Temp 97.3 °F (36.3 °C) (Skin)   Resp 16   Ht 4' 11\" (1.499 m)   Wt 85 lb (38.6 kg)   SpO2 98%   BMI 17.17 kg/m²

## 2020-10-02 ENCOUNTER — OFFICE VISIT (OUTPATIENT)
Dept: INTERNAL MEDICINE CLINIC | Age: 65
End: 2020-10-02
Payer: COMMERCIAL

## 2020-10-02 VITALS
HEIGHT: 59 IN | SYSTOLIC BLOOD PRESSURE: 146 MMHG | DIASTOLIC BLOOD PRESSURE: 76 MMHG | OXYGEN SATURATION: 97 % | TEMPERATURE: 98 F | RESPIRATION RATE: 18 BRPM | HEART RATE: 91 BPM | WEIGHT: 86 LBS | BODY MASS INDEX: 17.34 KG/M2

## 2020-10-02 DIAGNOSIS — F17.210 CIGARETTE NICOTINE DEPENDENCE WITHOUT COMPLICATION: ICD-10-CM

## 2020-10-02 DIAGNOSIS — J41.0 SIMPLE CHRONIC BRONCHITIS (HCC): ICD-10-CM

## 2020-10-02 DIAGNOSIS — I10 ESSENTIAL HYPERTENSION: Primary | ICD-10-CM

## 2020-10-02 DIAGNOSIS — Z12.31 ENCOUNTER FOR SCREENING MAMMOGRAM FOR MALIGNANT NEOPLASM OF BREAST: ICD-10-CM

## 2020-10-02 PROCEDURE — 99214 OFFICE O/P EST MOD 30 MIN: CPT | Performed by: NURSE PRACTITIONER

## 2020-10-02 RX ORDER — VALSARTAN 160 MG/1
160 TABLET ORAL DAILY
Qty: 180 TAB | Refills: 3 | Status: SHIPPED | OUTPATIENT
Start: 2020-10-02

## 2020-10-02 RX ORDER — VARENICLINE TARTRATE 25 MG
KIT ORAL
Qty: 1 DOSE PACK | Refills: 0 | Status: SHIPPED | OUTPATIENT
Start: 2020-10-02 | End: 2021-11-18 | Stop reason: ALTCHOICE

## 2020-10-02 RX ORDER — AMLODIPINE BESYLATE 10 MG/1
10 TABLET ORAL DAILY
Qty: 90 TAB | Refills: 3 | Status: SHIPPED | OUTPATIENT
Start: 2020-10-02

## 2020-10-02 NOTE — PROGRESS NOTES
Pt is here for   Chief Complaint   Patient presents with    Follow-up     HTN    Medication Refill     pending      Pt denies pain at this time    1. Have you been to the ER, urgent care clinic since your last visit? Hospitalized since your last visit? No    2. Have you seen or consulted any other health care providers outside of the [address] [place] [address] since your last visit? Include any pap smears or colon screening.  No

## 2020-10-02 NOTE — PATIENT INSTRUCTIONS
Low Sodium Diet (2,000 Milligram): Care Instructions Your Care Instructions Too much sodium causes your body to hold on to extra water. This can raise your blood pressure and force your heart and kidneys to work harder. In very serious cases, this could cause you to be put in the hospital. It might even be life-threatening. By limiting sodium, you will feel better and lower your risk of serious problems. The most common source of sodium is salt. People get most of the salt in their diet from canned, prepared, and packaged foods. Fast food and restaurant meals also are very high in sodium. Your doctor will probably limit your sodium to less than 2,000 milligrams (mg) a day. This limit counts all the sodium in prepared and packaged foods and any salt you add to your food. Follow-up care is a key part of your treatment and safety. Be sure to make and go to all appointments, and call your doctor if you are having problems. It's also a good idea to know your test results and keep a list of the medicines you take. How can you care for yourself at home? Read food labels · Read labels on cans and food packages. The labels tell you how much sodium is in each serving. Make sure that you look at the serving size. If you eat more than the serving size, you have eaten more sodium. · Food labels also tell you the Percent Daily Value for sodium. Choose products with low Percent Daily Values for sodium. · Be aware that sodium can come in forms other than salt, including monosodium glutamate (MSG), sodium citrate, and sodium bicarbonate (baking soda). MSG is often added to Asian food. When you eat out, you can sometimes ask for food without MSG or added salt. Buy low-sodium foods · Buy foods that are labeled \"unsalted\" (no salt added), \"sodium-free\" (less than 5 mg of sodium per serving), or \"low-sodium\" (less than 140 mg of sodium per serving).  Foods labeled \"reduced-sodium\" and \"light sodium\" may still have too much sodium. Be sure to read the label to see how much sodium you are getting. · Buy fresh vegetables, or frozen vegetables without added sauces. Buy low-sodium versions of canned vegetables, soups, and other canned goods. Prepare low-sodium meals · Cut back on the amount of salt you use in cooking. This will help you adjust to the taste. Do not add salt after cooking. One teaspoon of salt has about 2,300 mg of sodium. · Take the salt shaker off the table. · Flavor your food with garlic, lemon juice, onion, vinegar, herbs, and spices. Do not use soy sauce, lite soy sauce, steak sauce, onion salt, garlic salt, celery salt, mustard, or ketchup on your food. · Use low-sodium salad dressings, sauces, and ketchup. Or make your own salad dressings and sauces without adding salt. · Use less salt (or none) when recipes call for it. You can often use half the salt a recipe calls for without losing flavor. Other foods such as rice, pasta, and grains do not need added salt. · Rinse canned vegetables, and cook them in fresh water. This removes somebut not allof the salt. · Avoid water that is naturally high in sodium or that has been treated with water softeners, which add sodium. Call your local water company to find out the sodium content of your water supply. If you buy bottled water, read the label and choose a sodium-free brand. Avoid high-sodium foods · Avoid eating: 
? Smoked, cured, salted, and canned meat, fish, and poultry. ? Ham, ross, hot dogs, and luncheon meats. ? Regular, hard, and processed cheese and regular peanut butter. ? Crackers with salted tops, and other salted snack foods such as pretzels, chips, and salted popcorn. ? Frozen prepared meals, unless labeled low-sodium. ? Canned and dried soups, broths, and bouillon, unless labeled sodium-free or low-sodium. ? Canned vegetables, unless labeled sodium-free or low-sodium. ? Western Diya fries, pizza, tacos, and other fast foods. ? Pickles, olives, ketchup, and other condiments, especially soy sauce, unless labeled sodium-free or low-sodium. Where can you learn more? Go to http://www.gray.com/ Enter Q876 in the search box to learn more about \"Low Sodium Diet (2,000 Milligram): Care Instructions. \" Current as of: August 22, 2019               Content Version: 12.6 © 1050-7350 [employer]. Care instructions adapted under license by [employer] (which disclaims liability or warranty for this information). If you have questions about a medical condition or this instruction, always ask your healthcare professional. [address] any warranty or liability for your use of this information. Varenicline (Chantix, Chantix Starting [employer]) - (By mouth) Why this medicine is used:  
Helps you quit smoking, as part of a support program. 
Contact a nurse or doctor right away if you have: · Blistering, peeling, red skin rash · Chest pain, fast, pounding, or uneven heartbeat · Thoughts of hurting yourself or others, unusual moods or behaviors · Seizures · Feeling anxious, depressed, restless, or irritable, seeing or hearing things that are not really there · Numbness or weakness on one side of your body, sudden or severe headache, problems with vision, speech, or walking Common side effects: 
· Headache, trouble sleeping, unusual dreams · Nausea, constipation, gas © 2017 [facility] Information is for End User's use only and may not be sold, redistributed or otherwise used for commercial purposes. Deciding About Using Medicines To Quit Smoking How can you decide about using medicines to quit smoking? What are the medicines you can use? Your doctor may prescribe varenicline (Chantix) or bupropion (Zyban).  These medicines can help you cope with cravings for tobacco. They are pills that don't contain nicotine. You also can use nicotine replacement products. These do contain nicotine. There are many types. · Gum and lozenges slowly release nicotine into your mouth. · Patches stick to your skin. They slowly release nicotine into your bloodstream. 
· An inhaler has a valle that contains nicotine. You breathe in a puff of nicotine vapor through your mouth and throat. · Nasal spray releases a mist that contains nicotine. What are key points about this decision? · Using medicines can double your chances of quitting smoking. They can ease cravings and withdrawal symptoms. · Getting counseling along with using medicine can raise your chances of quitting even more. · If you smoke fewer than 5 cigarettes a day, you may not need medicines to help you quit smoking. · These medicines have less nicotine than cigarettes. And by itself, nicotine is not nearly as harmful as smoking. The tars, carbon monoxide, and other toxic chemicals in tobacco cause the harmful effects. · The side effects of nicotine replacement products depend on the type of product. For example, a patch can make your skin red and itchy. Medicines in pill form can make you sick to your stomach. They can also cause dry mouth and trouble sleeping. For most people, the side effects are not bad enough to make them stop using the products. Why might you choose to use medicines to quit smoking? · You have tried on your own to stop smoking, but you were not able to stop. · You smoke more than 5 cigarettes a day. · You want to increase your chances of quitting smoking. · You want to reduce your cravings and withdrawal symptoms. · You feel the benefits of medicine outweigh the side effects. Why might you choose not to use medicine? · You want to try quitting on your own by stopping all at once (\"cold turkey\"). · You want to cut back slowly on the number of cigarettes you smoke. · You smoke fewer than 5 cigarettes a day. · You do not like using medicine. · You feel the side effects of medicines outweigh the benefits. · You are worried about the cost of medicines. Your decision Thinking about the facts and your feelings can help you make a decision that is right for you. Be sure you understand the benefits and risks of your options, and think about what else you need to do before you make the decision. Where can you learn more? Go to http://www.gray.com/ Enter O089 in the search box to learn more about \"Deciding About Using Medicines To Quit Smoking. \" Current as of: March 12, 2020               Content Version: 12.6 © 3469-0715 [employer], [employer]orporated. Care instructions adapted under license by [employer] (which disclaims liability or warranty for this information). If you have questions about a medical condition or this instruction, always ask your healthcare professional. [address] any warranty or liability for your use of this information.

## 2020-10-02 NOTE — PROGRESS NOTES
Tonya Wright is a 62 y.o. female and presents with Follow-up (HTN) and Medication Refill (pending )    Subjective:  Hypertension Review:  The patient has hypertension. Diet and Lifestyle: generally does NOT try to follow a  low sodium diet, exercises sporadically   Home BP Monitoring: is NOT measured at home. Pertinent ROS: taking medications as instructed , no medication side effects. No TIA's, chest pain on exertion, dyspnea on exertion, or swelling of ankles. Smoking less now too, now at 1 pack per week. BP Readings from Last 3 Encounters:   10/02/20 (!) 146/76   10/01/20 (!) 167/73   08/31/20 (!) 171/93     COPD REVIEW:  The patient is being seen for follow up of COPD, reports coughing daily lately x 3 weeks with CHANGED sputum production; thick yellow. Course of sx: stable. Takes uses Albuterol infrequently  Oxygen: She currently is not on home oxygen therapy. Symptoms: chronic dyspnea: severity = not present  Patient uses 1-2 pillows at night. Patient does continue to smoke cigarettes, would like to try quitting. Quit in past by cold turkey. Currently smoking 1 ppd.      Review of Systems  Constitutional: negative for fevers, chills, anorexia and weight loss  Respiratory:  negative for cough, hemoptysis, dyspnea, and wheezing  CV:   negative for chest pain, palpitations, and lower extremity edema  GI:   negative for nausea, vomiting, diarrhea, abdominal pain, and melena  Endo:               negative for polyuria,polydipsia,polyphagia, and heat intolerance  Genitourinary: negative for frequency, urgency, dysuria, retention, and hematuria  Integument:  negative for rash, ulcerations, and pruritus  Hematologic:  negative for easy bruising and bleeding  Musculoskel: negative for arthralgias, muscle weakness,and joint pain/swelling  Neurological:  negative for headaches, dizziness, vertigo,and memory/gait problems  Behavl/Psych: negative for feelings of anxiety, depression, suicide, and mood changes    Past Medical History:   Diagnosis Date    Chronic leg pain 11/3/2010    fibromyalgia    COPD (chronic obstructive pulmonary disease) ([facility] [address] 9/18/2009    GERD (gastroesophageal reflux disease)     History of kidney cancer     Hypertension 9/18/2009    Leukopenia 9/18/2009    Other ill-defined conditions(799.89)     fibromyalgia    Vitamin D deficiency 7/7/2010     Past Surgical History:   Procedure Laterality Date    HX GYN      hysterectomy    HX HYSTERECTOMY      HX OOPHORECTOMY      HX ORTHOPAEDIC  06/07/2019    plantars wart removal      Social History     Socioeconomic History    Marital status: SINGLE     Spouse name: Not on file    Number of children: Not on file    Years of education: Not on file    Highest education level: Not on file   Tobacco Use    Smoking status: Current Every Day Smoker     Packs/day: 0.50    Smokeless tobacco: Never Used   Substance and Sexual Activity    Alcohol use: Yes     Comment: occasionally    Drug use: No    Sexual activity: Yes     Partners: Male     Birth control/protection: Surgical     Family History   Problem Relation Age of Onset    Diabetes Mother     Alcohol abuse Father     Cancer Sister         breast    Breast Cancer Sister         onset; around 54    Cancer Brother         breast    Breast Cancer Brother         onset: around 36    No Known Problems Sister     No Known Problems Brother      Current Outpatient Medications   Medication Sig Dispense Refill    valsartan (DIOVAN) 160 mg tablet Take 1 Tab by mouth daily. 180 Tab 3    amLODIPine (NORVASC) 10 mg tablet Take 1 Tab by mouth daily. 90 Tab 3    varenicline (CHANTIX STARTER TEE) 0.5 mg (11)- 1 mg (42) DsPk As directed 1 Dose Pack 0    levocetirizine (XYZAL) 5 mg tablet Take 1 Tab by mouth daily for 30 days. 30 Tab 0    guaiFENesin (Mucinex) 1,200 mg Ta12 ER tablet Take 1 Tab by mouth two (2) times a day for 7 days.  14 Tab 0    albuterol (PROVENTIL HFA, VENTOLIN HFA, PROAIR HFA) 90 mcg/actuation inhaler Take 1 Puff by inhalation every six (6) hours as needed for Wheezing or Cough for up to 14 days. 1 Inhaler 0    fluticasone propionate (FLONASE) 50 mcg/actuation nasal spray 2 Sprays by Both Nostrils route daily. 1 Bottle 0    varicella-zoster recombinant, PF, (SHINGRIX, PF,) 50 mcg/0.5 mL susr injection Pharmacy to administer 0.5 mL. 0.5 mL 1     Allergies   Allergen Reactions    Lisinopril Cough     Other reaction(s): Adverse reaction to substance     Lipitor [Atorvastatin] Vertigo       Objective:  Visit Vitals  BP (!) 146/76 (BP 1 Location: Right arm, BP Patient Position: Sitting)   Pulse 91   Temp 98 °F (36.7 °C) (Oral)   Resp 18   Ht 4' 11\" (1.499 m)   Wt 86 lb (39 kg)   SpO2 97%   BMI 17.37 kg/m²     Wt Readings from Last 3 Encounters:   10/02/20 86 lb (39 kg)   10/01/20 85 lb (38.6 kg)   08/31/20 86 lb 9.6 oz (39.3 kg)     Physical Exam:   General appearance - alert, well appearing, and in no distress. Very slender. Mental status - A/O x 4, normal mood and affect. Neck -Supple ,normal CSP. FROM, non-tender. No significant adenopathy/thyromegaly. No JVD. Chest - CTA. Symmetric chest rise. No wheezing, rales or rhonchi. Heart - Normal rate, regular rhythm. Normal S1, S2. No MGR or clicks. Abdomen - Soft,non-distended. Normoactive BS in all quadrants. NT, no mass or HSM. Ext- Radial, DP pulses, 2+ bilaterally. No pedal edema, clubbing, or cyanosis. Skin-Warm and dry. No hyperpigmentation, ulcerations, or suspicious lesions. Neuro - Normal speech, no focal findings or movement disorder. Normal strength, gait, and muscle tone. Assessment/Plan:  Smoking cessation discussed for 3-10 minutes, pt planning to quit, meds sent today. RESUMED 160 mg dosing of Valsartan and low salt diet advised. Pt may have been taking 80 mg mistakingly.    Medication Side Effects and Warnings were discussed with patient: yes   Patient Labs were reviewed: yes  Patient Past Records were reviewed: yes    See below for other orders   Follow-up and Dispositions    · Return in about 3 months (around 1/2/2021) for OV- htn, smok cess. ICD-10-CM ICD-9-CM    1. Essential hypertension  I10 401.9 valsartan (DIOVAN) 160 mg tablet      amLODIPine (NORVASC) 10 mg tablet   2. Simple chronic bronchitis (HCC)  J41.0 491.0 varenicline (CHANTIX STARTER TEE) 0.5 mg (11)- 1 mg (42) DsPk   3. Cigarette nicotine dependence without complication  D98.380 435.9 varenicline (CHANTIX STARTER TEE) 0.5 mg (11)- 1 mg (42) DsPk   4. Encounter for screening mammogram for malignant neoplasm of breast  Z12.31 V76.12 [facility] MAMMO BI SCREENING INCL CAD     Orders Placed This Encounter    [facility] MAMMO BI SCREENING INCL CAD     Standing Status:   Future     Standing Expiration Date:   11/3/2021     Order Specific Question:   Reason for Exam     Answer:   breast cancer screening    valsartan (DIOVAN) 160 mg tablet     Sig: Take 1 Tab by mouth daily. Dispense:  180 Tab     Refill:  3    amLODIPine (NORVASC) 10 mg tablet     Sig: Take 1 Tab by mouth daily. Dispense:  90 Tab     Refill:  3    varenicline (CHANTIX STARTER TEE) 0.5 mg (11)- 1 mg (42) DsPk     Sig: As directed     Dispense:  1 Dose Pack     Refill:  0       Lupe Flynn expressed understanding of plan. An After Visit Summary was offered/printed and given to the patient.

## 2020-11-24 ENCOUNTER — APPOINTMENT (OUTPATIENT)
Dept: GENERAL RADIOLOGY | Age: 65
End: 2020-11-24
Attending: PHYSICIAN ASSISTANT
Payer: COMMERCIAL

## 2020-11-24 ENCOUNTER — NURSE TRIAGE (OUTPATIENT)
Dept: OTHER | Facility: CLINIC | Age: 65
End: 2020-11-24

## 2020-11-24 ENCOUNTER — HOSPITAL ENCOUNTER (EMERGENCY)
Age: 65
Discharge: HOME | End: 2020-11-24
Attending: EMERGENCY MEDICINE | Admitting: EMERGENCY MEDICINE
Payer: COMMERCIAL

## 2020-11-24 VITALS
HEART RATE: 111 BPM | TEMPERATURE: 98.3 F | SYSTOLIC BLOOD PRESSURE: 169 MMHG | OXYGEN SATURATION: 100 % | BODY MASS INDEX: 16.71 KG/M2 | DIASTOLIC BLOOD PRESSURE: 74 MMHG | WEIGHT: 82.89 LBS | HEIGHT: 59 IN | RESPIRATION RATE: 16 BRPM

## 2020-11-24 DIAGNOSIS — Z20.822 ENCOUNTER FOR LABORATORY TESTING FOR COVID-19 VIRUS: ICD-10-CM

## 2020-11-24 DIAGNOSIS — J20.8 ACUTE BRONCHITIS DUE TO OTHER SPECIFIED ORGANISMS: Primary | ICD-10-CM

## 2020-11-24 LAB
FLUAV AG NPH QL IA: NEGATIVE
FLUBV AG NOSE QL IA: NEGATIVE

## 2020-11-24 PROCEDURE — 87804 INFLUENZA ASSAY W/OPTIC: CPT

## 2020-11-24 PROCEDURE — 71046 X-RAY EXAM CHEST 2 VIEWS: CPT

## 2020-11-24 PROCEDURE — 99281 EMR DPT VST MAYX REQ PHY/QHP: CPT

## 2020-11-24 PROCEDURE — 87635 SARS-COV-2 COVID-19 AMP PRB: CPT

## 2020-11-24 RX ORDER — METHYLPREDNISOLONE 4 MG/1
TABLET ORAL
Qty: 1 DOSE PACK | Refills: 0 | Status: SHIPPED | OUTPATIENT
Start: 2020-11-24

## 2020-11-24 RX ORDER — AZITHROMYCIN 250 MG/1
TABLET, FILM COATED ORAL
Qty: 6 TAB | Refills: 0 | Status: SHIPPED | OUTPATIENT
Start: 2020-11-24 | End: 2020-11-29

## 2020-11-24 NOTE — DISCHARGE INSTRUCTIONS
Patient Education   Patient will be called in 48-72 hours for positive results only. You may reference [facility] for all results. Bronchitis: Care Instructions  Your Care Instructions    Bronchitis is inflammation of the bronchial tubes, which carry air to the lungs. The tubes swell and produce mucus, or phlegm. The mucus and inflamed bronchial tubes make you cough. You may have trouble breathing. Most cases of bronchitis are caused by viruses like those that cause colds. Antibiotics usually do not help and they may be harmful. Bronchitis usually develops rapidly and lasts about 2 to 3 weeks in otherwise healthy people. Follow-up care is a key part of your treatment and safety. Be sure to make and go to all appointments, and call your doctor if you are having problems. It's also a good idea to know your test results and keep a list of the medicines you take. How can you care for yourself at home? · Take all medicines exactly as prescribed. Call your doctor if you think you are having a problem with your medicine. · Get some extra rest.  · Take an over-the-counter pain medicine, such as acetaminophen (Tylenol), ibuprofen (Advil, Motrin), or naproxen (Aleve) to reduce fever and relieve body aches. Read and follow all instructions on the label. · Do not take two or more pain medicines at the same time unless the doctor told you to. Many pain medicines have acetaminophen, which is Tylenol. Too much acetaminophen (Tylenol) can be harmful. · Take an over-the-counter cough medicine that contains dextromethorphan to help quiet a dry, hacking cough so that you can sleep. Avoid cough medicines that have more than one active ingredient. Read and follow all instructions on the label. · Breathe moist air from a humidifier, hot shower, or sink filled with hot water. The heat and moisture will thin mucus so you can cough it out. · Do not smoke. Smoking can make bronchitis worse.  If you need help quitting, talk to your doctor about stop-smoking programs and medicines. These can increase your chances of quitting for good. When should you call for help? Call 911 anytime you think you may need emergency care. For example, call if:    · You have severe trouble breathing.    Call your doctor now or seek immediate medical care if:    · You have new or worse trouble breathing.     · You cough up dark brown or bloody mucus (sputum).     · You have a new or higher fever.     · You have a new rash.    Watch closely for changes in your health, and be sure to contact your doctor if:    · You cough more deeply or more often, especially if you notice more mucus or a change in the color of your mucus.     · You are not getting better as expected. Where can you learn more? Go to http://www.gray.com/  Enter H333 in the search box to learn more about \"Bronchitis: Care Instructions. \"  Current as of: June 9, 2019Content Version: 12.4  © 8891-8480 Healthwise, Incorporated. Care instructions adapted under license by [employer] (which disclaims liability or warranty for this information). If you have questions about a medical condition or this instruction, always ask your healthcare professional. [address] any warranty or liability for your use of this information.

## 2020-11-24 NOTE — ED PROVIDER NOTES
EMERGENCY DEPARTMENT HISTORY AND PHYSICAL EXAM      Date: 11/24/2020  Patient Name: Dennis Bermudez    History of Presenting Illness     Chief Complaint   Patient presents with    Cough     With thick flem x 2 months. Pt reports seeing PCP and taking OTC without relief. Pt ambulatory to triage. Denies SOB/N/V.    Fatigue     worse over the past 1 week. Pt denies dizziness, reports she does feel off balance. History Provided By: Patient    HPI: Dennis Bermudez, 62 y.o. female presents to the ED with cc of fatigue x 1 week. Patient states she has had a cough for 2 months. She has been seen by her PCP on multiple occassions but nothing has helped. Patient states over the past week the cough has worsened with a productive yellow phlegm and fatigue. She has noted associated loss of appetite and fevers with the highest being yesterday of 99.3F. Patient has tried Tylenol and Mucinex with no relief. Patient denies known exposure to COVID-19 but works in the Critical access hospital. Patient denies loss of taste/ smell, recent travel, abdominal pain, N/V/D, CP, and SOB. There are no other complaints, changes, or physical findings at this time. PCP: Henrry Rojas NP    No current facility-administered medications on file prior to encounter. Current Outpatient Medications on File Prior to Encounter   Medication Sig Dispense Refill    valsartan (DIOVAN) 160 mg tablet Take 1 Tab by mouth daily. 180 Tab 3    amLODIPine (NORVASC) 10 mg tablet Take 1 Tab by mouth daily. 90 Tab 3    varenicline (CHANTIX STARTER TEE) 0.5 mg (11)- 1 mg (42) DsPk As directed 1 Dose Pack 0    fluticasone propionate (FLONASE) 50 mcg/actuation nasal spray 2 Sprays by Both Nostrils route daily. 1 Bottle 0    varicella-zoster recombinant, PF, (SHINGRIX, PF,) 50 mcg/0.5 mL susr injection Pharmacy to administer 0.5 mL.  0.5 mL 1       Past History     Past Medical History:  Past Medical History:   Diagnosis Date    Chronic leg pain 11/3/2010    fibromyalgia    COPD (chronic obstructive pulmonary disease) ([facility] [address] 9/18/2009    GERD (gastroesophageal reflux disease)     History of kidney cancer     Hypertension 9/18/2009    Leukopenia 9/18/2009    Other ill-defined conditions(799.89)     fibromyalgia    Vitamin D deficiency 7/7/2010       Past Surgical History:  Past Surgical History:   Procedure Laterality Date    HX GYN      hysterectomy    HX HYSTERECTOMY      HX OOPHORECTOMY      HX ORTHOPAEDIC  06/07/2019    plantars wart removal        Family History:  Family History   Problem Relation Age of Onset    Diabetes Mother     Alcohol abuse Father     Cancer Sister         breast    Breast Cancer Sister         onset; around 54    Cancer Brother         breast    Breast Cancer Brother         onset: around 36    No Known Problems Sister     No Known Problems Brother        Social History:  Social History     Tobacco Use    Smoking status: Current Every Day Smoker     Packs/day: 0.50    Smokeless tobacco: Never Used   Substance Use Topics    Alcohol use: Yes     Comment: occasionally    Drug use: No       Allergies: Allergies   Allergen Reactions    Lisinopril Cough     Other reaction(s):  Adverse reaction to substance     Lipitor [Atorvastatin] Vertigo         Review of Systems   Review of Systems    Physical Exam   Physical Exam    Diagnostic Study Results     Labs -     Recent Results (from the past 12 hour(s))   SARS-COV-2    Collection Time: 11/24/20  1:40 PM   Result Value Ref Range    Specimen source Nasopharyngeal      SARS-CoV-2 PENDING     SARS-CoV-2 PENDING     Specimen source Nasopharyngeal      COVID-19 rapid test PENDING     Specimen type NP Swab      Health status PENDING     COVID-19 PENDING    INFLUENZA A+B VIRAL AGS    Collection Time: 11/24/20  1:40 PM   Result Value Ref Range    Influenza A Antigen Negative NEG      Influenza B Antigen Negative NEG         Radiologic Studies -   XR CHEST PA LAT (Results Pending)     CT Results  (Last 48 hours)    None        CXR Results  (Last 48 hours)    None          Medical Decision Making   I am the first provider for this patient. I reviewed the vital signs, available nursing notes, past medical history, past surgical history, family history and social history. Vital Signs-Reviewed the patient's vital signs. Patient Vitals for the past 12 hrs:   Temp Pulse Resp BP SpO2   11/24/20 1109 98.3 °F (36.8 °C) (!) 111 16 (!) 169/74 100 %         Provider Notes (Medical Decision Making):   URI, Pneumonia, acute bronchitis, COVID-19. Pulse oximetry interpretation: 100% on RA - normal    ED Course:   Initial assessment performed. The patients presenting problems have been discussed, and they are in agreement with the care plan formulated and outlined with them. I have encouraged them to ask questions as they arise throughout their visit.    2:50pm  Provider has attempted to contact radiology several times due to pending CXR. Patient prefers to be discharged home and called with results. States it is ok to leave test results on answer machine. Disposition:  DC- Adult Discharges: All of the diagnostic tests were reviewed and questions answered. Diagnosis, care plan and treatment options were discussed. The patient understands the instructions and will follow up as directed. The patients results have been reviewed with them. They have been counseled regarding their diagnosis. The patient verbally convey understanding and agreement of the signs, symptoms, diagnosis, treatment and prognosis and additionally agrees to follow up as recommended with their PCP in 24 - 48 hours. They also agree with the care-plan and convey that all of their questions have been answered.   I have also put together some discharge instructions for them that include: 1) educational information regarding their diagnosis, 2) how to care for their diagnosis at home, as well a 3) list of reasons why they would want to return to the ED prior to their follow-up appointment, should their condition change. DISCHARGE PLAN:  1. Discharge Medication List as of 11/24/2020  3:06 PM      START taking these medications    Details   methylPREDNISolone (Medrol, Tee,) 4 mg tablet Take as directed on dosage pack, Normal, Disp-1 Dose Pack,R-0      azithromycin (Zithromax Z-Tee) 250 mg tablet SIG: Take 2 tabs today; then take 1 tab daily for 4 days, Normal, Disp-6 Tab,R-0         CONTINUE these medications which have NOT CHANGED    Details   valsartan (DIOVAN) 160 mg tablet Take 1 Tab by mouth daily. , Normal, Disp-180 Tab,R-3      amLODIPine (NORVASC) 10 mg tablet Take 1 Tab by mouth daily. , Normal, Disp-90 Tab,R-3      varenicline (CHANTIX STARTER TEE) 0.5 mg (11)- 1 mg (42) DsPk As directed, Normal, Disp-1 Dose Pack,R-0      fluticasone propionate (FLONASE) 50 mcg/actuation nasal spray 2 Sprays by Both Nostrils route daily. , Normal, Disp-1 Bottle,R-0      varicella-zoster recombinant, PF, (SHINGRIX, PF,) 50 mcg/0.5 mL susr injection Pharmacy to administer 0.5 mL., Normal, Disp-0.5 mL, R-1           2. Follow-up Information     Follow up With Specialties Details Why Contact Padmaja Frey NP Nurse Practitioner Schedule an appointment as soon as possible for a visit in 1 week  [place] [address] [facility]  [phone]      [facility] EMERGENCY DEPT Emergency Medicine  As needed or, If symptoms worsen [address]  [facility] [address]   [phone]        3. Return to ED if worse     Diagnosis     Clinical Impression:   1. Acute bronchitis due to other specified organisms    2. Encounter for laboratory testing for COVID-19 virus        Attestations:    Rodger Muñoz    Please note that this dictation was completed with [employer], the [email or web address] voice recognition software.   Quite often unanticipated grammatical, syntax, homophones, and other interpretive errors are inadvertently transcribed by the computer software. Please disregard these errors. Please excuse any errors that have escaped final proofreading. Thank you.

## 2020-11-24 NOTE — TELEPHONE ENCOUNTER
Reason for Disposition   Patient sounds very sick or weak to the triager    Answer Assessment - Initial Assessment Questions  1. COVID-19 DIAGNOSIS: \"Who made your Coronavirus (COVID-19) diagnosis? \" \"Was it confirmed by a positive lab test?\" If not diagnosed by a HCP, ask \"Are there lots of cases (community spread) where you live? \" (See public health department website, if unsure)      N//a  2. ONSET: \"When did the COVID-19 symptoms start? \"       Onset was about a week ago  3. WORST SYMPTOM: \"What is your worst symptom? \" (e.g., cough, fever, shortness of breath, muscle aches)      Cough - keeps her awake at night and profound weakness  4. COUGH: \"Do you have a cough? \" If so, ask: \"How bad is the cough? \"        Deep hacking cough (phlegm is yellow)  5. FEVER: \"Do you have a fever? \" If so, ask: \"What is your temperature, how was it measured, and when did it start? \"      Fever 99.5  6. RESPIRATORY STATUS: \"Describe your breathing? \" (e.g., shortness of breath, wheezing, unable to speak)       No shortness of breath  7. BETTER-SAME-WORSE: Romeo Sleet you getting better, staying the same or getting worse compared to yesterday? \"  If getting worse, ask, \"In what way? \"      worse  8. HIGH RISK DISEASE: \"Do you have any chronic medical problems? \" (e.g., asthma, heart or lung disease, weak immune system, etc.)      Hypertension (well controlled on meds)  9. PREGNANCY: \"Is there any chance you are pregnant? \" \"When was your last menstrual period? \"      No  10. OTHER SYMPTOMS: \"Do you have any other symptoms? \"  (e.g., chills, fatigue, headache, loss of smell or taste, muscle pain, sore throat)        Hacking cough, chest congestion, fever, extreme weakness/fatigue    Protocols used: CORONAVIRUS (COVID-19) DIAGNOSED OR SUSPECTED-ADULT-AH    Patient reports covid like symptoms of a hacking cough (keeps her awake at night), chest congestion (phlegm is yellow), fever (curretnly 99.5) and profound weakness.   Pateint emphasizes weakness to triage nurse. Pateint is at the ED. Recommended patient be seen there due to weakness. Provided care advice.

## 2020-11-25 ENCOUNTER — PATIENT OUTREACH (OUTPATIENT)
Dept: OTHER | Age: 65
End: 2020-11-25

## 2020-11-25 LAB
COVID-19, XGCOVT: NOT DETECTED
HEALTH STATUS, XMCV2T: NORMAL
SOURCE, COVRS: NORMAL
SPECIMEN SOURCE, FCOV2M: NORMAL
SPECIMEN TYPE, XMCV1T: NORMAL

## 2020-11-25 NOTE — PROGRESS NOTES
Patient contacted regarding COVID-19  risk. Discussed COVID-19 related testing which was pending at this time. Test results were pending. Patient informed of results, if available? yes. Outreach made within 2 business days of discharge: Yes    Care Transition Nurse/ Ambulatory Care Manager/ LPN Care Coordinator contacted the patient by telephone to perform post discharge assessment. Verified name and  with patient as identifiers. Provided introduction to self, and explanation of the CTN/ACM/LPN role, and reason for call due to risk factors for infection and/or exposure to COVID-19. Symptoms reviewed with patient who verbalized the following symptoms: fever, fatigue and cough. Due to no new or worsening symptoms encounter was routed to provider for escalation. Discussed follow-up appointments. If no appointment was previously scheduled, appointment scheduling offered: yes  [facility] follow up appointment(s):   Future Appointments   Date Time Provider Maru Davison   2021  2:00 PM [facility]     Non-BS follow up appointment(s): None      Advance Care Planning:   Does patient have an Advance Directive: currently not on file; patient declined education    Patient has following risk factors of: COPD. CTN/ACM/LPN reviewed discharge instructions, medical action plan and red flags such as increased shortness of breath, increasing fever and signs of decompensation with patient who verbalized understanding. Discussed exposure protocols and quarantine with CDC Guidelines What to do if you are sick with coronavirus disease .  Patient was given an opportunity for questions and concerns. The patient agrees to contact the [facility] exposure line [phone], [facility] department [facility]  ([phone]) and PCP office for questions related to their healthcare. CTN/ACM provided contact information for future needs.     Reviewed and educated patient on any new and changed medications related to discharge diagnosis. Patient/family/caregiver given information for [employer] and agrees to enroll no  Plan for follow-up call in 5-7 days based on severity of symptoms and risk factors.

## 2020-12-03 ENCOUNTER — OFFICE VISIT (OUTPATIENT)
Dept: INTERNAL MEDICINE CLINIC | Age: 65
End: 2020-12-03
Payer: COMMERCIAL

## 2020-12-03 VITALS
SYSTOLIC BLOOD PRESSURE: 147 MMHG | DIASTOLIC BLOOD PRESSURE: 83 MMHG | RESPIRATION RATE: 19 BRPM | OXYGEN SATURATION: 98 % | HEIGHT: 59 IN | HEART RATE: 99 BPM | WEIGHT: 83.2 LBS | TEMPERATURE: 97.3 F | BODY MASS INDEX: 16.77 KG/M2

## 2020-12-03 DIAGNOSIS — L72.3 INFECTED SEBACEOUS CYST: Primary | ICD-10-CM

## 2020-12-03 DIAGNOSIS — L08.9 INFECTED SEBACEOUS CYST: Primary | ICD-10-CM

## 2020-12-03 DIAGNOSIS — L02.211 CUTANEOUS ABSCESS OF ABDOMINAL WALL: ICD-10-CM

## 2020-12-03 PROCEDURE — 99213 OFFICE O/P EST LOW 20 MIN: CPT | Performed by: NURSE PRACTITIONER

## 2020-12-03 PROCEDURE — 10060 I&D ABSCESS SIMPLE/SINGLE: CPT | Performed by: NURSE PRACTITIONER

## 2020-12-03 RX ORDER — CEPHALEXIN 500 MG/1
500 CAPSULE ORAL 2 TIMES DAILY
Qty: 14 CAP | Refills: 0 | Status: SHIPPED | OUTPATIENT
Start: 2020-12-03 | End: 2020-12-10

## 2020-12-03 NOTE — PATIENT INSTRUCTIONS
Skin Abscess: Care Instructions  Your Care Instructions     A skin abscess is a bacterial infection that forms a pocket of pus. A boil is a kind of skin abscess. The doctor may have cut an opening in the abscess so that the pus can drain out. You may have gauze in the cut so that the abscess will stay open and keep draining. You may need antibiotics. You will need to follow up with your doctor to make sure the infection has gone away. The doctor has checked you carefully, but problems can develop later. If you notice any problems or new symptoms, get medical treatment right away. Follow-up care is a key part of your treatment and safety. Be sure to make and go to all appointments, and call your doctor if you are having problems. It's also a good idea to know your test results and keep a list of the medicines you take. How can you care for yourself at home? · Apply warm and dry compresses, a heating pad set on low, or a hot water bottle 3 or 4 times a day for pain. Keep a cloth between the heat source and your skin. · If your doctor prescribed antibiotics, take them as directed. Do not stop taking them just because you feel better. You need to take the full course of antibiotics. · Take pain medicines exactly as directed. ? If the doctor gave you a prescription medicine for pain, take it as prescribed. ? If you are not taking a prescription pain medicine, ask your doctor if you can take an over-the-counter medicine. · Keep your bandage clean and dry. Change the bandage whenever it gets wet or dirty, or at least one time a day. · If the abscess was packed with gauze:  ? Keep follow-up appointments to have the gauze changed or removed. If the doctor instructed you to remove the gauze, follow the instructions you were given for how to remove it. ? After the gauze is removed, soak the area in warm water for 15 to 20 minutes 2 times a day, until the wound closes. When should you call for help?    Call your doctor now or seek immediate medical care if:    · You have signs of worsening infection, such as:  ? Increased pain, swelling, warmth, or redness. ? Red streaks leading from the infected skin. ? Pus draining from the wound. ? A fever. Watch closely for changes in your health, and be sure to contact your doctor if:    · You do not get better as expected. Where can you learn more? Go to http://www.gray.com/  Enter H440 in the search box to learn more about \"Skin Abscess: Care Instructions. \"  Current as of: July 2, 2020               Content Version: 12.6  © 2147-4386 [employer]. Care instructions adapted under license by [employer] (which disclaims liability or warranty for this information). If you have questions about a medical condition or this instruction, always ask your healthcare professional. [address] any warranty or liability for your use of this information.

## 2020-12-03 NOTE — PROGRESS NOTES
HISTORY OF PRESENT ILLNESS  Stan Jones is a 62 y.o. female. Having right abdominal cyst for years, however over past 3 days having redness and warmth. Skin Problem   The history is provided by the patient. This is a new problem. The current episode started more than 2 days ago. The problem occurs constantly. The problem has been rapidly worsening. Associated symptoms include abdominal pain. Pertinent negatives include no chest pain, no headaches and no shortness of breath. Nothing aggravates the symptoms. Nothing relieves the symptoms. She has tried a warm compress for the symptoms. The treatment provided no relief. Patient Active Problem List   Diagnosis Code    Hypertension I10    COPD (chronic obstructive pulmonary disease) ([facility] J44.9    Leukopenia D72.819    Vitamin D deficiency E55.9    Chronic leg pain M79.606, G89.29    Fibromyalgia, primary M79.7    Left renal mass N28.89    Regular astigmatism H52.229    Nuclear cataract [id number]    Cigarette nicotine dependence without complication Q47.956    BMI less than 19,adult Z68.1     Patient Active Problem List    Diagnosis Date Noted    Cigarette nicotine dependence without complication 52/01/4568    BMI less than 19,adult 07/12/2018    Regular astigmatism 06/28/2016    Nuclear cataract 06/28/2016    Left renal mass 06/11/2014    Fibromyalgia, primary 03/08/2012    Chronic leg pain 11/03/2010    Vitamin D deficiency 07/07/2010    Hypertension 09/18/2009    COPD (chronic obstructive pulmonary disease) ([facility] 09/18/2009    Leukopenia 09/18/2009     Current Outpatient Medications   Medication Sig Dispense Refill    cephALEXin (Keflex) 500 mg capsule Take 1 Cap by mouth two (2) times a day for 7 days. Indications: an infection of the skin and the tissue below the skin 14 Cap 0    valsartan (DIOVAN) 160 mg tablet Take 1 Tab by mouth daily. 180 Tab 3    amLODIPine (NORVASC) 10 mg tablet Take 1 Tab by mouth daily.  90 Tab 3    varenicline (CHANTIX STARTER TEE) 0.5 mg (11)- 1 mg (42) DsPk As directed 1 Dose Pack 0    fluticasone propionate (FLONASE) 50 mcg/actuation nasal spray 2 Sprays by Both Nostrils route daily. 1 Bottle 0    methylPREDNISolone (Medrol, Tee,) 4 mg tablet Take as directed on dosage pack 1 Dose Pack 0    varicella-zoster recombinant, PF, (SHINGRIX, PF,) 50 mcg/0.5 mL susr injection Pharmacy to administer 0.5 mL. 0.5 mL 1     Allergies   Allergen Reactions    Lisinopril Cough     Other reaction(s): Adverse reaction to substance     Lipitor [Atorvastatin] Vertigo     Past Medical History:   Diagnosis Date    Chronic leg pain 11/3/2010    fibromyalgia    COPD (chronic obstructive pulmonary disease) ([facility] 9/18/2009    GERD (gastroesophageal reflux disease)     History of kidney cancer     Hypertension 9/18/2009    Leukopenia 9/18/2009    Other ill-defined conditions(799.89)     fibromyalgia    Vitamin D deficiency 7/7/2010     Past Surgical History:   Procedure Laterality Date    HX GYN      hysterectomy    HX HYSTERECTOMY      HX OOPHORECTOMY      HX ORTHOPAEDIC  06/07/2019    plantars wart removal      Family History   Problem Relation Age of Onset    Diabetes Mother     Alcohol abuse Father     Cancer Sister         breast    Breast Cancer Sister         onset; around 54    Cancer Brother         breast    Breast Cancer Brother         onset: around 36    No Known Problems Sister     No Known Problems Brother      Social History     Tobacco Use    Smoking status: Current Every Day Smoker     Packs/day: 0.50    Smokeless tobacco: Never Used   Substance Use Topics    Alcohol use: Yes     Comment: occasionally        Review of Systems   Constitutional: Negative for fever and malaise/fatigue. Respiratory: Negative for cough and shortness of breath. Cardiovascular: Negative for chest pain. Gastrointestinal: Positive for abdominal pain. Negative for nausea.    Musculoskeletal: Negative for myalgias. Neurological: Negative for headaches. All other systems reviewed and are negative. Physical Exam  Vitals signs and nursing note reviewed. Constitutional:       General: She is not in acute distress. Appearance: She is well-developed. She is not diaphoretic. HENT:      Head: Normocephalic and atraumatic. Right Ear: External ear normal.      Left Ear: External ear normal.   Cardiovascular:      Rate and Rhythm: Normal rate. Pulmonary:      Effort: Pulmonary effort is normal. No respiratory distress. Abdominal:      General: There is no distension. Skin:     General: Skin is warm. Findings: Abscess (right abdominal wall) and erythema present. Neurological:      Mental Status: She is alert and oriented to person, place, and time. Psychiatric:         Behavior: Behavior normal.         Judgment: Judgment normal.         ASSESSMENT and PLAN:  [facility]  OFFICE PROCEDURE PROGRESS NOTE        Chart reviewed for the following:   Danilo BONILLA NP, have reviewed the History, Physical and updated the Allergic reactions for Ashley Garcia performed immediately prior to start of procedure:   Danilo BONILLA NP, have performed the following reviews on Curtis Marks prior to the start of the procedure:            * Patient was identified by name and date of birth   * Agreement on procedure being performed was verified  * Risks and Benefits explained to the patient  * Procedure site verified and marked as necessary  * Patient was positioned for comfort  * Consent was signed and verified     Time: 9520      Date of procedure: 12/3/2020    Procedure performed by:   Danilo Muniz NP    Patient assisted by: self    How tolerated by patient: tolerated the procedure well with no complications    Post Procedural Pain Scale: 0 - No Hurt    Comments: none    I&D Simple- After informed written consent was obtained, RIGHT abdominal wall cleansed using alcohol and betadinie, THEN infiltrated with 1% Lidocaine  without epinephrine for anesthetic. Using sterile technique #11 blade used for incision. Wound culture collected with COPIOUS purulent, bloody drainage expressed from wound. 1/4 in wound packing placed with dressing applied and wound care instructions provided. Advised pt to be alert for any signs of  infection. The procedure was well tolerated without complications. ICD-10-CM ICD-9-CM    1. Infected sebaceous cyst  L72.3 706.2 ANAEROBIC/AEROBIC/GRAM STAIN    L08.9  DRAIN SKIN ABSCESS SIMPLE   2. Cutaneous abscess of abdominal wall  L02.211 682. 2 ANAEROBIC/AEROBIC/GRAM STAIN      DRAIN SKIN ABSCESS SIMPLE

## 2020-12-03 NOTE — PROGRESS NOTES
Pt is here for   Chief Complaint   Patient presents with    Cyst     on right side, pt states that it hurts like a toothach. . pt states x 3 days, warm to touch, pt states it started out as a little bump that has been there for years, but three days ago it got bigger and its painful    ED Follow-up     for breathing issues      Pt states pain level is a 9/10 right side    1. Have you been to the ER, urgent care clinic since your last visit? Hospitalized since your last visit? Yes When: for respiratory issues ED [facility]    2. Have you seen or consulted any other health care providers outside of the [address] [place] [address] since your last visit? Include any pap smears or colon screening.  No

## 2020-12-08 LAB
BACTERIA SPEC AEROBE CULT: ABNORMAL
BACTERIA SPEC ANAEROBE CULT: ABNORMAL
BACTERIA SPEC ANAEROBE CULT: ABNORMAL
GRAM STN SPEC: ABNORMAL
GRAM STN SPEC: ABNORMAL

## 2020-12-11 ENCOUNTER — PATIENT OUTREACH (OUTPATIENT)
Dept: OTHER | Age: 65
End: 2020-12-11

## 2020-12-11 NOTE — PROGRESS NOTES
Date/Time:  12/11/2020 10:19 AM  Attempted to reach patient by telephone. Left HIPPA compliant message requesting a return call. Will attempt to reach patient again.

## 2020-12-21 ENCOUNTER — PATIENT OUTREACH (OUTPATIENT)
Dept: OTHER | Age: 65
End: 2020-12-21

## 2020-12-21 NOTE — LETTER
12/21/2020 10:59 AM 
 
Ms. Jenna De Santiago [address] [phone] I have been trying to reach you for a follow up call for services with our Employee Care Management Program. Your wellbeing is very important to us. With continued partnership in the [facility] program, we hope to work with you to optimize your health and increase your quality of life. I look forward to continuing to work with you. Please contact me at your convenience and we can schedule a time that works best for you. Sincerely, Freada Hodgkin, RN Freada Hodgkin RN  Employee Care Manager [address] [place] [address], [address] [place], [place] [address] [facility] Cell [phone] Fax [email or web address] [employer] [email or web address]

## 2021-02-05 ENCOUNTER — OFFICE VISIT (OUTPATIENT)
Dept: INTERNAL MEDICINE CLINIC | Age: 66
End: 2021-02-05
Payer: COMMERCIAL

## 2021-02-05 VITALS
BODY MASS INDEX: 17.86 KG/M2 | RESPIRATION RATE: 17 BRPM | HEIGHT: 59 IN | DIASTOLIC BLOOD PRESSURE: 78 MMHG | TEMPERATURE: 98.6 F | WEIGHT: 88.6 LBS | SYSTOLIC BLOOD PRESSURE: 149 MMHG | OXYGEN SATURATION: 97 % | HEART RATE: 85 BPM

## 2021-02-05 DIAGNOSIS — F17.210 CIGARETTE NICOTINE DEPENDENCE WITHOUT COMPLICATION: ICD-10-CM

## 2021-02-05 DIAGNOSIS — I10 ESSENTIAL HYPERTENSION: Primary | ICD-10-CM

## 2021-02-05 PROCEDURE — 99212 OFFICE O/P EST SF 10 MIN: CPT | Performed by: NURSE PRACTITIONER

## 2021-02-05 NOTE — PATIENT INSTRUCTIONS
Learning About Mindfulness for Stress What are mindfulness and stress? Stress is what you feel when you have to handle more than you are used to. A lot of things can cause stress. You may feel stress when you go on a job interview, take a test, or run a race. This kind of short-term stress is normal and even useful. It can help you if you need to work hard or react quickly. Stress also can last a long time. Long-term stress is caused by stressful situations or events. Examples of long-term stress include long-term health problems, ongoing problems at work, and conflicts in your family. Long-term stress can harm your health. Mindfulness is a focus only on things happening in the present moment. It's a process of purposefully paying attention to and being aware of your surroundings, your emotions, your thoughts, and how your body feels. You are aware of these things, but you aren't judging these experiences as \"good\" or \"bad. \" Mindfulness can help you learn to calm your mind and body to help you cope with illness, pain, and stress. How does mindfulness help to relieve stress? Mindfulness can help quiet your mind and relax your body. Studies show that it can help some people sleep better, feel less anxious, and bring their blood pressure down. And it's been shown to help some people live and cope better with certain health problems like heart disease, depression, chronic pain, and cancer. How do you practice mindfulness? To be mindful is to pay attention, to be present, and to be accepting. · When you're mindful, you do just one thing and you pay close attention to that one thing. For example, you may sit quietly and notice your emotions or how your food tastes and smells. · When you're present, you focus on the things that are happening right now. You let go of your thoughts about the past and the future. When you dwell on the past or the future, you miss moments that can heal and strengthen you. You may miss moments like hearing a child laugh or seeing a friendly face when you think you're all alone. · When you're accepting, you don't  the present moment. Instead you accept your thoughts and feelings as they come. You can practice anytime, anywhere, and in any way you choose. You can practice in many ways. Here are a few ideas: · While doing your chores, like washing the dishes, let your mind focus on what's in your hand. What does the dish feel like? Is the water warm or cold? · Go outside and take a few deep breaths. What is the air like? Is it warm or cold? · When you can, take some time at the start of your day to sit alone and think. · Take a slow walk by yourself. Count your steps while you breathe in and out. · Try yoga breathing exercises, stretches, and poses to strengthen and relax your muscles. · At work, if you can, try to stop for a few moments each hour. Note how your body feels. Let yourself regroup and let your mind settle before you return to what you were doing. · If you struggle with anxiety or \"worry thoughts,\" imagine your mind as a blue connor and your worry thoughts as clouds. Now imagine those worry thoughts floating across your mind's connor. Just let them pass by as you watch. Follow-up care is a key part of your treatment and safety. Be sure to make and go to all appointments, and call your doctor if you are having problems. It's also a good idea to know your test results and keep a list of the medicines you take. Where can you learn more? Go to http://www.gray.com/ Enter V895 in the search box to learn more about \"Learning About Mindfulness for Stress. \" 
 Current as of: December 16, 2019               Content Version: 12.6 © 0753-3976 [email or web address]. Care instructions adapted under license by [employer] (which disclaims liability or warranty for this information). If you have questions about a medical condition or this instruction, always ask your healthcare professional. [address] any warranty or liability for your use of this information.

## 2021-02-05 NOTE — PROGRESS NOTES
Mari Cartagena (: 1955) is a 60 y.o. female, established patient, here for evaluation of the following chief complaint(s):  Follow-up (htn, smok cess.)       ASSESSMENT/PLAN:  1. Essential hypertension    2. Cigarette nicotine dependence without complication        Return in about 6 months (around 2021) for OV- annual labs for HTN. Pt asked to complete follow by next visit: The current medical regimen is effective;  continue present plan and medications. SUBJECTIVE/OBJECTIVE:  HPI    Pt presents to f/u HTN and smoking cessation. Had stopped smoking, but had 2 cigs today after looking at check. Taking medications as prescribed, finished chantix with great results. Feels good. No acute complaints otherwise. Denies side effects from medication.    BP Readings from Last 3 Encounters:   21 (!) 149/78   20 (!) 147/83   20 (!) 169/74         Review of Systems  Constitutional: negative for fevers, chills, anorexia and weight loss  Respiratory:  negative for cough, hemoptysis, dyspnea, and wheezing  CV:   negative for chest pain, palpitations, and lower extremity edema  GI:   negative for nausea, vomiting, diarrhea, abdominal pain, and melena  Endo:               negative for polyuria,polydipsia,polyphagia, and heat intolerance  Genitourinary: negative for frequency, urgency, dysuria, retention, and hematuria  Integument:  negative for rash, ulcerations, and pruritus  Hematologic:  negative for easy bruising and bleeding  Musculoskel: negative for arthralgias, muscle weakness,and joint pain/swelling  Neurological:  negative for headaches, dizziness, vertigo,and memory/gait problems  Behavl/Psych: negative for feelings of anxiety, depression, suicide, and mood changes    Visit Vitals  BP (!) 149/78 (BP 1 Location: Left upper arm, BP Patient Position: Sitting, BP Cuff Size: Small adult)   Pulse 85   Temp 98.6 °F (37 °C) (Oral)   Resp 17   Ht 4' 11\" (1.499 m)   Wt 88 lb 9.6 oz (40.2 kg) SpO2 97%   BMI 17.90 kg/m²       Wt Readings from Last 3 Encounters:   02/05/21 88 lb 9.6 oz (40.2 kg)   12/03/20 83 lb 3.2 oz (37.7 kg)   11/24/20 82 lb 14.3 oz (37.6 kg)         Physical Exam:   General appearance - alert, well appearing, and in no distress. Mental status - A/O x 4,normal mood and affect. Chest - CTA. Symmetric chest rise. No wheezing. No distress. Heart - Normal rate & rhythm. Normal S1 & S2. No MGR. Abdomen- Soft, round. Non-distended, NT. No pulsatile masses or hernias. Ext-  No pedal edema, clubbing, or cyanosis. Skin-Warm and dry. No hyperpigmentation, ulcerations, or suspicious lesions. Neuro - Normal speech, no focal findings or movement disorder. Normal strength, gait, and muscle tone. An electronic signature was used to authenticate this note.   -- Terese Navarro NP

## 2021-02-05 NOTE — PROGRESS NOTES
Pt is here for   Chief Complaint   Patient presents with    Follow-up     htn, smok cess. Pt denies pain at this time    1. Have you been to the ER, urgent care clinic since your last visit? Hospitalized since your last visit? No    2. Have you seen or consulted any other health care providers outside of the [address] [place] [address] since your last visit? Include any pap smears or colon screening.  No

## 2021-11-18 ENCOUNTER — HOSPITAL ENCOUNTER (OUTPATIENT)
Dept: GENERAL RADIOLOGY | Age: 66
Discharge: HOME | End: 2021-11-18
Payer: COMMERCIAL

## 2021-11-18 ENCOUNTER — TRANSCRIBE ORDER (OUTPATIENT)
Dept: REGISTRATION | Age: 66
End: 2021-11-18

## 2021-11-18 ENCOUNTER — OFFICE VISIT (OUTPATIENT)
Dept: INTERNAL MEDICINE CLINIC | Age: 66
End: 2021-11-18
Payer: COMMERCIAL

## 2021-11-18 VITALS
BODY MASS INDEX: 17.74 KG/M2 | SYSTOLIC BLOOD PRESSURE: 133 MMHG | RESPIRATION RATE: 17 BRPM | DIASTOLIC BLOOD PRESSURE: 66 MMHG | TEMPERATURE: 96.9 F | OXYGEN SATURATION: 99 % | WEIGHT: 88 LBS | HEIGHT: 59 IN | HEART RATE: 78 BPM

## 2021-11-18 DIAGNOSIS — Z13.21 SCREENING FOR ENDOCRINE, NUTRITIONAL, METABOLIC AND IMMUNITY DISORDER: ICD-10-CM

## 2021-11-18 DIAGNOSIS — L72.3 INFECTED SEBACEOUS CYST: Primary | ICD-10-CM

## 2021-11-18 DIAGNOSIS — C64.2 RENAL CARCINOMA, LEFT (HCC): Primary | ICD-10-CM

## 2021-11-18 DIAGNOSIS — J41.0 SIMPLE CHRONIC BRONCHITIS (HCC): ICD-10-CM

## 2021-11-18 DIAGNOSIS — E55.9 VITAMIN D DEFICIENCY: ICD-10-CM

## 2021-11-18 DIAGNOSIS — Z13.220 SCREENING FOR LIPID DISORDERS: ICD-10-CM

## 2021-11-18 DIAGNOSIS — Z12.11 SPECIAL SCREENING FOR MALIGNANT NEOPLASMS, COLON: ICD-10-CM

## 2021-11-18 DIAGNOSIS — L08.9 INFECTED SEBACEOUS CYST: Primary | ICD-10-CM

## 2021-11-18 DIAGNOSIS — Z12.31 BREAST CANCER SCREENING BY MAMMOGRAM: ICD-10-CM

## 2021-11-18 DIAGNOSIS — Z85.528 HISTORY OF RENAL CELL CARCINOMA: ICD-10-CM

## 2021-11-18 DIAGNOSIS — Z13.0 SCREENING FOR ENDOCRINE, NUTRITIONAL, METABOLIC AND IMMUNITY DISORDER: ICD-10-CM

## 2021-11-18 DIAGNOSIS — Z13.228 SCREENING FOR ENDOCRINE, NUTRITIONAL, METABOLIC AND IMMUNITY DISORDER: ICD-10-CM

## 2021-11-18 DIAGNOSIS — Z91.89 AT RISK FOR OSTEOPOROSIS: ICD-10-CM

## 2021-11-18 DIAGNOSIS — Z13.29 SCREENING FOR ENDOCRINE, NUTRITIONAL, METABOLIC AND IMMUNITY DISORDER: ICD-10-CM

## 2021-11-18 DIAGNOSIS — I10 ESSENTIAL HYPERTENSION: ICD-10-CM

## 2021-11-18 PROCEDURE — 99214 OFFICE O/P EST MOD 30 MIN: CPT | Performed by: NURSE PRACTITIONER

## 2021-11-18 PROCEDURE — 74018 RADEX ABDOMEN 1 VIEW: CPT

## 2021-11-18 RX ORDER — CEPHALEXIN 500 MG/1
500 CAPSULE ORAL 2 TIMES DAILY
Qty: 14 CAPSULE | Refills: 0 | Status: SHIPPED | OUTPATIENT
Start: 2021-11-18 | End: 2021-11-25

## 2021-11-18 NOTE — PROGRESS NOTES
Rocío Ruano (: 1955) is a 60 y.o. female, established patient, here for evaluation of the following chief complaint(s): Annual Exam and Skin Problem (boil)       ASSESSMENT/PLAN:  Below is the assessment and plan developed based on review of pertinent history, physical exam, labs, studies, and medications. 1. Infected sebaceous cyst  -     REFERRAL TO GENERAL SURGERY  -     cephALEXin (Keflex) 500 mg capsule; Take 1 Capsule by mouth two (2) times a day for 7 days. Indications: cyst to abd, Normal, Disp-14 Capsule, R-0  2. Simple chronic bronchitis ([address]  3. History of renal cell carcinoma  -     XR ABD (KUB); Future  4. Essential hypertension  -     METABOLIC PANEL, COMPREHENSIVE; Future  -     CBC WITH AUTOMATED DIFF; Future  5. BMI less than 30,DWODY  -     METABOLIC PANEL, COMPREHENSIVE; Future  -     CBC WITH AUTOMATED DIFF; Future  -     TSH 3RD GENERATION; Future  6. Vitamin D deficiency  -     VITAMIN D, 25 HYDROXY; Future  7. Breast cancer screening by mammogram  -     [facility] MAMMO BI SCREENING INCL CAD; Future  8. At risk for osteoporosis  -     DEXA BONE DENSITY STUDY AXIAL; Future  9. Screening for endocrine, nutritional, metabolic and immunity disorder  -     METABOLIC PANEL, COMPREHENSIVE; Future  -     CBC WITH AUTOMATED DIFF; Future  -     TSH 3RD GENERATION; Future  -     VITAMIN D, 25 HYDROXY; Future  10. Screening for lipid disorders  -     LIPID PANEL; Future  11. Special screening for malignant neoplasms, colon  -     REFERRAL TO GASTROENTEROLOGY      Return in about 6 months (around 2022) for OV- 6 month f/u. Pt asked to complete follow by next visit: continue to watch cyst, may take antibiotics now or hold, but given referral to GEN surg otherwise to address. No warmth, tenderness or drainage today. Annual labs ordered otherwise. BP well controlled. SUBJECTIVE/OBJECTIVE:  HPI    Pt presents to f/u annual labs, HTN, and concern for abd wall abscess returning.  Right abdominal with knot like before, started as blackhead and enlarging over past month. No injury or puncture to area. No pain, drainage, or warmth. No treatments tried. Taking BP meds as prescribed. Denies side effects from medication. Feels good otherwise. No acute complaints otherwise. No report of unilateral weakness, headaches, blurring vision, CP, SOB,or  leg swelling. BP Readings from Last 3 Encounters:   11/18/21 133/66   02/05/21 (!) 149/78   12/03/20 (!) 147/83         Review of Systems  Constitutional: negative for fevers, chills, anorexia and weight loss  Respiratory:  negative for cough, hemoptysis, dyspnea, and wheezing  CV:   negative for chest pain, palpitations, and lower extremity edema  GI:   negative for nausea, vomiting, diarrhea, abdominal pain, and melena  Endo:               negative for polyuria,polydipsia,polyphagia, and heat intolerance  Genitourinary: negative for frequency, urgency, dysuria, retention, and hematuria  Integument:  negative for rash, ulcerations, and pruritus  Hematologic:  negative for easy bruising and bleeding  Musculoskel: negative for arthralgias, muscle weakness,and joint pain/swelling  Neurological:  negative for headaches, dizziness, vertigo,and memory/gait problems  Behavl/Psych: negative for feelings of anxiety, depression, suicide, and mood changes    Visit Vitals  /66 (BP 1 Location: Left upper arm, BP Patient Position: Sitting, BP Cuff Size: Adult)   Pulse 78   Temp 96.9 °F (36.1 °C) (Temporal)   Resp 17   Ht 4' 11\" (1.499 m)   Wt 88 lb (39.9 kg)   SpO2 99%   BMI 17.77 kg/m²       Wt Readings from Last 3 Encounters:   11/18/21 88 lb (39.9 kg)   02/05/21 88 lb 9.6 oz (40.2 kg)   12/03/20 83 lb 3.2 oz (37.7 kg)         Physical Exam:   General appearance - alert, well appearing, and in no distress. Mental status - A/O x 4,normal mood and affect. Chest - CTA. Symmetric chest rise. No wheezing. No distress. Heart - Normal rate & rhythm.  Normal S1 & S2. No MGR. Abdomen- Soft, round. Non-distended, NT. No pulsatile masses or hernias. Ext-  No pedal edema, clubbing, or cyanosis. Skin-Warm and dry. No hyperpigmentation, ulcerations, or suspicious lesions. +right abdomen along MAL with sebaceous cyst and closed comedone. Soft and mobile, NT. no redness or drainage. Neuro - Normal speech, no focal findings or movement disorder. Normal strength, gait, and muscle tone. An electronic signature was used to authenticate this note.   -- Terese Navarro NP

## 2021-11-18 NOTE — PROGRESS NOTES
Pt is here for   Chief Complaint   Patient presents with    Annual Exam    Skin Problem     boil     1. Have you been to the ER, urgent care clinic since your last visit? Hospitalized since your last visit? No    2. Have you seen or consulted any other health care providers outside of the [address] [place] [address] since your last visit? Include any pap smears or colon screening.  No

## 2021-11-18 NOTE — PATIENT INSTRUCTIONS
Learning About Calcium  What is calcium? Calcium keeps your bones and muscles--including your heart--healthy and strong. Your body needs vitamin D to absorb calcium. People who don't get enough calcium and vitamin D throughout life have an increased chance of having thin and brittle bones (osteoporosis) in their later years. Thin and brittle bones break easily. They can lead to serious injuries. This is why it's important for you to get enough calcium and vitamin D as a child and as an adult. It helps keep your bones strong as you get older. And it protects you against possible breaks. Your body also uses vitamin D to help your muscles absorb calcium and work well. If your muscles don't get enough calcium, then they can cramp, hurt, or feel weak. You may have long-term (chronic) muscle aches and pains. How much calcium do you need? How much calcium you need each day changes as you age. Here are the recommended daily allowances (RDAs) for calcium:  · Ages 1 to 3 years: 700 milligrams  · Ages 4 to 8 years: 1,000 milligrams  · Ages 5 to 25 years: 1,300 milligrams  · Ages 23 to 48 years: 1,000 milligrams  · Males 46 to 79 years: 1,000 milligrams  · Females 46 to 79 years: 1,200 milligrams  · Ages 70 and older: 1,200 milligrams  Women who are pregnant or breastfeeding need the same amount of calcium and vitamin D as other women their age. How can you get enough calcium? Calcium is in foods such as milk, cheese, and yogurt. Vegetables like broccoli, kale, and Chinese cabbage also have it. You can get calcium if you eat the soft edible bones in canned sardines and canned salmon. Foods with added (fortified) calcium include some cereals, juices, soy drinks, and tofu. The food label will show how much of it was added. You can figure out how much calcium is in a food by looking at the percent daily value section on the nutrition facts label. The food label assumes the daily value of calcium is 1,300 mg.  So if one serving of a food has a daily value of 20% of calcium, that food has 260 mg of calcium in one serving. Some people who don't get enough calcium may need supplements. Two common calcium supplements are calcium citrate and calcium carbonate. Calcium carbonate is best absorbed when it is taken with food. Calcium citrate can be absorbed well with or without food. Spreading calcium out over the course of the day can reduce stomach upset. And your body absorbs it better when it is spread over the day. Try not to take more than 500 mg of calcium supplement at a time. Where can you learn more? Go to http://www.gray.com/  Enter S264 in the search box to learn more about \"Learning About Calcium. \"  Current as of: December 17, 2020               Content Version: 13.0  © 2006-2021 [employer]. Care instructions adapted under license by [employer] (which disclaims liability or warranty for this information). If you have questions about a medical condition or this instruction, always ask your healthcare professional. [place] [address] any warranty or liability for your use of this information. Preventing Osteoporosis: Care Instructions  Your Care Instructions     Osteoporosis means the bones are weak and thin enough that they can break easily. The older you are, the more likely you are to get osteoporosis. But with plenty of calcium, vitamin D, and exercise, you can help prevent osteoporosis. The preteen and teen years are a key time for bone building. With the help of calcium, vitamin D, and exercise in those early years and beyond, the bones reach their peak density and strength by age 27. After age 27, your bones naturally start to thin and weaken. The stronger your bones are at around age 27, the lower your risk for osteoporosis. But no matter what your age and risk are, your bones still need calcium, vitamin D, and exercise to stay strong.  Also avoid smoking, and limit alcohol. Smoking and heavy alcohol use can make your bones thinner. Talk to your doctor about any special risks you might have, such as having a close relative with osteoporosis or taking a medicine that can weaken bones. Your doctor can tell you the best ways to protect your bones from thinning. Follow-up care is a key part of your treatment and safety. Be sure to make and go to all appointments, and call your doctor if you are having problems. It's also a good idea to know your test results and keep a list of the medicines you take. How can you care for yourself at home? · Get enough calcium and vitamin D.  ? Eat foods rich in calcium, like yogurt, cheese, milk, and dark green vegetables. ? Eat foods rich in vitamin D, like eggs, fatty fish, cereal, and fortified milk. ? Get some sunshine. Your body uses sunshine to make its own vitamin D.  ? Talk to your doctor about taking a calcium plus vitamin D supplement if you don't think you are getting enough through your diet and sunshine. · Get regular bone-building exercise. Walking, jogging, dancing, and lifting weights can make your bones stronger. · Limit alcohol. Drink no more than 1 alcohol drink a day if you are a woman. Drink no more than 2 alcohol drinks a day if you are a man. · Do not smoke. Smoking can make bones thin faster. If you need help quitting, talk to your doctor about stop-smoking programs and medicines. These can increase your chances of quitting for good. When should you call for help? Watch closely for changes in your health, and be sure to contact your doctor if you have any problems. Where can you learn more? Go to http://www.gray.com/  Enter Y681 in the search box to learn more about \"Preventing Osteoporosis: Care Instructions. \"  Current as of: December 7, 2020               Content Version: 13.0  © 9537-9504 [employer].    Care instructions adapted under license by [employer] (which disclaims liability or warranty for this information). If you have questions about a medical condition or this instruction, always ask your healthcare professional. [address] any warranty or liability for your use of this information.

## 2021-11-26 ENCOUNTER — TELEPHONE (OUTPATIENT)
Dept: SURGERY | Age: 66
End: 2021-11-26

## 2021-11-26 NOTE — TELEPHONE ENCOUNTER
Referred to: Sherif  Referred by: Solange Amador NP  Dx:  Infected sebaceous cyst  Appt Type: NP    Cld pt to schedule - man answered and line dropped

## 2021-11-29 NOTE — TELEPHONE ENCOUNTER
Referred to: Sherif  Referred by: Sherrie Bailon NP  Dx:  Infected sebaceous cyst  Appt Type: NP    Cld pt to schedule - no answer, LVM

## 2022-01-10 ENCOUNTER — HOSPITAL ENCOUNTER (EMERGENCY)
Age: 67
Discharge: HOME | End: 2022-01-10
Attending: STUDENT IN AN ORGANIZED HEALTH CARE EDUCATION/TRAINING PROGRAM
Payer: COMMERCIAL

## 2022-01-10 VITALS
BODY MASS INDEX: 17.02 KG/M2 | WEIGHT: 84.44 LBS | TEMPERATURE: 98.2 F | HEIGHT: 59 IN | HEART RATE: 89 BPM | RESPIRATION RATE: 16 BRPM | OXYGEN SATURATION: 99 % | DIASTOLIC BLOOD PRESSURE: 81 MMHG | SYSTOLIC BLOOD PRESSURE: 175 MMHG

## 2022-01-10 DIAGNOSIS — G43.909 MIGRAINE WITHOUT STATUS MIGRAINOSUS, NOT INTRACTABLE, UNSPECIFIED MIGRAINE TYPE: Primary | ICD-10-CM

## 2022-01-10 DIAGNOSIS — I10 HYPERTENSION, UNCONTROLLED: ICD-10-CM

## 2022-01-10 LAB
ALBUMIN SERPL-MCNC: 4 G/DL (ref 3.5–5)
ALBUMIN/GLOB SERPL: 1.1 {RATIO} (ref 1.1–2.2)
ALP SERPL-CCNC: 71 U/L (ref 45–117)
ALT SERPL-CCNC: 20 U/L (ref 12–78)
ANION GAP SERPL CALC-SCNC: 2 MMOL/L (ref 5–15)
APPEARANCE UR: CLEAR
AST SERPL-CCNC: 24 U/L (ref 15–37)
BACTERIA URNS QL MICRO: NEGATIVE /HPF
BASOPHILS # BLD: 0.1 K/UL (ref 0–0.1)
BASOPHILS NFR BLD: 1 % (ref 0–1)
BILIRUB SERPL-MCNC: 0.5 MG/DL (ref 0.2–1)
BILIRUB UR QL: NEGATIVE
BUN SERPL-MCNC: 17 MG/DL (ref 6–20)
BUN/CREAT SERPL: 20 (ref 12–20)
CALCIUM SERPL-MCNC: 9.2 MG/DL (ref 8.5–10.1)
CHLORIDE SERPL-SCNC: 106 MMOL/L (ref 97–108)
CO2 SERPL-SCNC: 30 MMOL/L (ref 21–32)
COLOR UR: NORMAL
CREAT SERPL-MCNC: 0.85 MG/DL (ref 0.55–1.02)
DIFFERENTIAL METHOD BLD: ABNORMAL
EOSINOPHIL # BLD: 0 K/UL (ref 0–0.4)
EOSINOPHIL NFR BLD: 1 % (ref 0–7)
EPITH CASTS URNS QL MICRO: NORMAL /LPF
ERYTHROCYTE [DISTWIDTH] IN BLOOD BY AUTOMATED COUNT: 15.9 % (ref 11.5–14.5)
GLOBULIN SER CALC-MCNC: 3.5 G/DL (ref 2–4)
GLUCOSE SERPL-MCNC: 87 MG/DL (ref 65–100)
GLUCOSE UR STRIP.AUTO-MCNC: NEGATIVE MG/DL
HCT VFR BLD AUTO: 43.6 % (ref 35–47)
HGB BLD-MCNC: 14.1 G/DL (ref 11.5–16)
HGB UR QL STRIP: NEGATIVE
HYALINE CASTS URNS QL MICRO: NORMAL /LPF (ref 0–5)
IMM GRANULOCYTES # BLD AUTO: 0 K/UL (ref 0–0.04)
IMM GRANULOCYTES NFR BLD AUTO: 1 % (ref 0–0.5)
KETONES UR QL STRIP.AUTO: NEGATIVE MG/DL
LEUKOCYTE ESTERASE UR QL STRIP.AUTO: NEGATIVE
LIPASE SERPL-CCNC: 113 U/L (ref 73–393)
LYMPHOCYTES # BLD: 1.5 K/UL (ref 0.8–3.5)
LYMPHOCYTES NFR BLD: 27 % (ref 12–49)
MCH RBC QN AUTO: 25.2 PG (ref 26–34)
MCHC RBC AUTO-ENTMCNC: 32.3 G/DL (ref 30–36.5)
MCV RBC AUTO: 78 FL (ref 80–99)
MONOCYTES # BLD: 0.4 K/UL (ref 0–1)
MONOCYTES NFR BLD: 7 % (ref 5–13)
NEUTS SEG # BLD: 3.6 K/UL (ref 1.8–8)
NEUTS SEG NFR BLD: 63 % (ref 32–75)
NITRITE UR QL STRIP.AUTO: NEGATIVE
NRBC # BLD: 0 K/UL (ref 0–0.01)
NRBC BLD-RTO: 0 PER 100 WBC
PH UR STRIP: 7 [PH] (ref 5–8)
PLATELET # BLD AUTO: 329 K/UL (ref 150–400)
PMV BLD AUTO: 9.7 FL (ref 8.9–12.9)
POTASSIUM SERPL-SCNC: 5.1 MMOL/L (ref 3.5–5.1)
PROT SERPL-MCNC: 7.5 G/DL (ref 6.4–8.2)
PROT UR STRIP-MCNC: NEGATIVE MG/DL
RBC # BLD AUTO: 5.59 M/UL (ref 3.8–5.2)
RBC #/AREA URNS HPF: NORMAL /HPF (ref 0–5)
SODIUM SERPL-SCNC: 138 MMOL/L (ref 136–145)
SP GR UR REFRACTOMETRY: 1.01 (ref 1–1.03)
UA: UC IF INDICATED,UAUC: NORMAL
UROBILINOGEN UR QL STRIP.AUTO: 0.2 EU/DL (ref 0.2–1)
WBC # BLD AUTO: 5.6 K/UL (ref 3.6–11)
WBC URNS QL MICRO: NORMAL /HPF (ref 0–4)

## 2022-01-10 PROCEDURE — 99284 EMERGENCY DEPT VISIT MOD MDM: CPT

## 2022-01-10 PROCEDURE — 74011250636 HC RX REV CODE- 250/636: Performed by: STUDENT IN AN ORGANIZED HEALTH CARE EDUCATION/TRAINING PROGRAM

## 2022-01-10 PROCEDURE — 36415 COLL VENOUS BLD VENIPUNCTURE: CPT

## 2022-01-10 PROCEDURE — 96361 HYDRATE IV INFUSION ADD-ON: CPT

## 2022-01-10 PROCEDURE — 74011000250 HC RX REV CODE- 250: Performed by: STUDENT IN AN ORGANIZED HEALTH CARE EDUCATION/TRAINING PROGRAM

## 2022-01-10 PROCEDURE — 81001 URINALYSIS AUTO W/SCOPE: CPT

## 2022-01-10 PROCEDURE — 83690 ASSAY OF LIPASE: CPT

## 2022-01-10 PROCEDURE — 85025 COMPLETE CBC W/AUTO DIFF WBC: CPT

## 2022-01-10 PROCEDURE — 96375 TX/PRO/DX INJ NEW DRUG ADDON: CPT

## 2022-01-10 PROCEDURE — 96374 THER/PROPH/DIAG INJ IV PUSH: CPT

## 2022-01-10 PROCEDURE — 80053 COMPREHEN METABOLIC PANEL: CPT

## 2022-01-10 RX ORDER — DIPHENHYDRAMINE HYDROCHLORIDE 50 MG/ML
25 INJECTION, SOLUTION INTRAMUSCULAR; INTRAVENOUS
Status: COMPLETED | OUTPATIENT
Start: 2022-01-10 | End: 2022-01-10

## 2022-01-10 RX ORDER — KETOROLAC TROMETHAMINE 30 MG/ML
15 INJECTION, SOLUTION INTRAMUSCULAR; INTRAVENOUS
Status: COMPLETED | OUTPATIENT
Start: 2022-01-10 | End: 2022-01-10

## 2022-01-10 RX ADMIN — DIPHENHYDRAMINE HYDROCHLORIDE 25 MG: 50 INJECTION, SOLUTION INTRAMUSCULAR; INTRAVENOUS at 12:24

## 2022-01-10 RX ADMIN — SODIUM CHLORIDE 500 ML: 9 INJECTION, SOLUTION INTRAVENOUS at 12:28

## 2022-01-10 RX ADMIN — PROCHLORPERAZINE EDISYLATE 5 MG: 5 INJECTION INTRAMUSCULAR; INTRAVENOUS at 12:25

## 2022-01-10 RX ADMIN — KETOROLAC TROMETHAMINE 15 MG: 30 INJECTION, SOLUTION INTRAMUSCULAR at 12:25

## 2022-01-10 NOTE — ED PROVIDER NOTES
EMERGENCY DEPARTMENT HISTORY AND PHYSICAL EXAM      Date: 1/10/2022  Patient Name: Erica Wills    History of Presenting Illness     Chief Complaint   Patient presents with    Headache     onset yesterday ; no head injury    Abdominal Pain     straight across onset this morning; no vomiting. History Provided By: Patient    HPI: Erica Wills, 73 y.o. female with past medical history of hypertension, COPD, GERD, fibromyalgia, migraines, kidney cancer in remission, presents to the ED with cc of headache and abdominal pain. Patient reports symptoms came on over the past day. Reports headache involves her entire head, feels like her previous migraine headaches. Associated with photophobia. No vision changes, confusion, speech deficits, weakness, numbness, gait disturbances, incoordination, or other concerning neurologic deficits. She denies any neck stiffness or neck pain. In addition to the above complaint, patient also report experiencing diffuse bilateral abdominal pain since earlier this morning, which she describes as cramping-like in sensation. Not associated with nausea, vomiting, diarrhea, constipation, urinary complaints, abnormal vaginal discharge, fevers or chills. PCP: Irena Sewell NP    No current facility-administered medications on file prior to encounter. Current Outpatient Medications on File Prior to Encounter   Medication Sig Dispense Refill    methylPREDNISolone (Medrol, Zack,) 4 mg tablet Take as directed on dosage pack 1 Dose Pack 0    valsartan (DIOVAN) 160 mg tablet Take 1 Tab by mouth daily. 180 Tab 3    amLODIPine (NORVASC) 10 mg tablet Take 1 Tab by mouth daily.  80 Tab 3       Past History     Past Medical History:  Past Medical History:   Diagnosis Date    Chronic leg pain 11/3/2010    fibromyalgia    COPD (chronic obstructive pulmonary disease) ([facility] [address] 9/18/2009    GERD (gastroesophageal reflux disease)     History of kidney cancer     Hypertension 9/18/2009    Left renal mass 6/11/2014    Leukopenia 9/18/2009    Leukopenia 9/18/2009    Followed by Dr Lorena Mckinney Other ill-defined conditions(799.89)     fibromyalgia    Regular astigmatism 6/28/2016    Vitamin D deficiency 7/7/2010       Past Surgical History:  Past Surgical History:   Procedure Laterality Date    HX GYN      hysterectomy    HX HYSTERECTOMY      HX OOPHORECTOMY      HX ORTHOPAEDIC  06/07/2019    plantars wart removal        Family History:  Family History   Problem Relation Age of Onset    Diabetes Mother     Alcohol abuse Father     Cancer Sister         breast    Breast Cancer Sister         onset; around 54    Cancer Brother         breast    Breast Cancer Brother         onset: around 36    No Known Problems Sister     No Known Problems Brother        Social History:  Social History     Tobacco Use    Smoking status: Current Every Day Smoker     Packs/day: 0.50    Smokeless tobacco: Never Used   Vaping Use    Vaping Use: Never used   Substance Use Topics    Alcohol use: Yes     Comment: occasionally    Drug use: No       Allergies: Allergies   Allergen Reactions    Lisinopril Cough     Other reaction(s): Adverse reaction to substance     Lipitor [Atorvastatin] Vertigo         Review of Systems   Review of Systems   Constitutional: Negative for chills and fever. HENT: Negative for congestion and rhinorrhea. Eyes: Negative for visual disturbance. Respiratory: Negative for chest tightness and shortness of breath. Cardiovascular: Negative for chest pain, palpitations and leg swelling. Gastrointestinal: Positive for abdominal pain. Negative for diarrhea, nausea and vomiting. Genitourinary: Negative for dysuria, flank pain and hematuria. Musculoskeletal: Negative for back pain and neck pain. Skin: Negative for rash. Allergic/Immunologic: Negative for immunocompromised state. Neurological: Positive for headaches.  Negative for dizziness, speech difficulty and weakness. Hematological: Negative for adenopathy. Psychiatric/Behavioral: Negative for dysphoric mood and suicidal ideas. Physical Exam   Physical Exam  Vitals and nursing note reviewed. Constitutional:       General: She is not in acute distress. Appearance: She is underweight. She is not ill-appearing, toxic-appearing or diaphoretic. HENT:      Head: Normocephalic and atraumatic. Nose: Nose normal.      Mouth/Throat:      Mouth: Mucous membranes are moist.   Eyes:      Extraocular Movements: Extraocular movements intact. Pupils: Pupils are equal, round, and reactive to light. Cardiovascular:      Rate and Rhythm: Normal rate and regular rhythm. Pulses: Normal pulses. Pulmonary:      Effort: Pulmonary effort is normal.      Breath sounds: No stridor. No wheezing or rhonchi. Abdominal:      General: Abdomen is flat. Bowel sounds are normal. There is no distension. Palpations: Abdomen is soft. Tenderness: There is no abdominal tenderness. Musculoskeletal:         General: Normal range of motion. Cervical back: Normal range of motion and neck supple. Skin:     General: Skin is warm and dry. Neurological:      General: No focal deficit present. Mental Status: She is alert and oriented to person, place, and time. Psychiatric:         Judgment: Judgment normal.         Diagnostic Study Results     Labs -   No results found for this or any previous visit (from the past 24 hour(s)). Radiologic Studies -   No orders to display     CT Results  (Last 48 hours)    None        CXR Results  (Last 48 hours)    None          Medical Decision Making   Delicia OBNILLA MD am the first provider for this patient, and I am the attending of record for this patient encounter. I reviewed the vital signs, available nursing notes, past medical history, past surgical history, family history and social history.     Vital Signs-Reviewed the patient's vital signs. Patient Vitals for the past 24 hrs:   Temp Pulse Resp BP SpO2   01/10/22 1029 98.1 °F (36.7 °C) 96 14 (!) 185/84 98 %     Records Reviewed: Nursing Notes and Old Medical Records    Provider Notes (Medical Decision Making):   Ddx: Migraine versus tension headache versus abdominal migraine, GERD, gastritis, pancreatitis, gastroenteritis, constipation, gallbladder pathology, appendicitis, diverticulitis, acute surgical abdomen, etc.    Do not suspect meningitis/encephalitis as patient has no meningismus signs. Headache appears to be uncomplicated, and similar in character to her previous. No concerning neurologic deficits. No indication for head imaging. In terms of patient's abdominal pain, she has no reproducible abdominal tenderness on my exam.  She does not appear toxic or in distress, has no other associated GI complaints. We will check abdominal labs, UA, and monitor the patient. We will hold off on any abdominal imaging at this time. Will treat patient's symptoms with Toradol, Compazine, Benadryl and IV fluids. Will reassess for improvement. Labs largely unremarkable. On my repeat evaluation, patient reports that her headache has now completely resolved. Also reports she is no longer experiencing any abdominal pain. Repeat abdominal exam again completely benign. No indication for imaging. Previous abdominal pain could have been related to her headache versus GERD, which patient has a history of. Patient's blood pressure has been noted to be elevated in the ED, which is discussed with her. She is advised to be compliant with her antihypertensive medications, and see her PCP for further evaluation of this if her home meds are inadequate at controlling her pressures. She verbalized understanding agreement. Patient is discharged in stable improved condition at this time. Return precautions discussed. ED Course:   Initial assessment performed.  The patient's presenting problems have been discussed, and they are in agreement with the care plan formulated and outlined with them. I have encouraged them to ask questions as they arise throughout their visit. Iliana Polo MD      Disposition:  Discharge      DISCHARGE PLAN:  1. Discharge Medication List as of 1/10/2022  2:11 PM        2. Follow-up Information     Follow up With Specialties Details Why [address] [facility], [address], NP Nurse Practitioner   Maru Briscoeisti  [address] [facility] [address]  [address]  [phone]          3. Return to ED if worse     Diagnosis     Clinical Impression:   1. Migraine without status migrainosus, not intractable, unspecified migraine type    2. Hypertension, uncontrolled        Attestations:    Iliana Polo MD    Please note that this dictation was completed with [employer], the computer voice recognition software. Quite often unanticipated grammatical, syntax, homophones, and other interpretive errors are inadvertently transcribed by the computer software. Please disregard these errors. Please excuse any errors that have escaped final proofreading. Thank you.

## 2022-01-11 ENCOUNTER — PATIENT OUTREACH (OUTPATIENT)
Dept: OTHER | Age: 67
End: 2022-01-11

## 2022-01-11 NOTE — PROGRESS NOTES
Initial HPRP:   Patient on report as discharged from ED [facility] ED Visit 1/10/22 for Migraine. Initial attempt to contact patient for transitions of care.  Left discreet message on voicemail with this Care Coordinator's contact information.  Will attempt outreach on 1/12/22.      Call 911 anytime you think you may need emergency care. For example, call if:   You have signs of a stroke. These may include:  Sudden numbness, paralysis, or weakness in your face, arm, or leg, especially on only one  side of your body. Sudden vision changes. Sudden trouble speaking. Sudden confusion or trouble understanding simple statements. Sudden problems with walking or balance. A sudden, severe headache that is different from past headaches. Call your doctor now or seek immediate medical care if:  You have new or worse nausea and vomiting. You have a new or higher fever. Your headache gets much worse. You are not getting better after 2 days (48 hours).

## 2022-01-12 ENCOUNTER — PATIENT OUTREACH (OUTPATIENT)
Dept: OTHER | Age: 67
End: 2022-01-12

## 2022-01-12 NOTE — PROGRESS NOTES
Patient identified as eligible for [address] [place] [address] services. Second telephone outreach attempted. Left discreet voicemail with this CM confidential contact information. Will send UTR letter via [address] Next Outreach 1/27/22 f/rosy Lopez.

## 2022-01-22 NOTE — PROGRESS NOTES
Chief Complaint   Patient presents with    Foot Pain     Pt c/o bottom of left foot pain x 2 weeks. FOCUS/GOAL  Bladder management, Skin integrity, and Safety management    ASSESSMENT, INTERVENTIONS AND CONTINUING PLAN FOR GOAL:  Pt A/O x 4, SBA x1 with walker and gait belt.  BG 89 and 138 this shift, no sliding scale insulin needed.  Peg tube site bandage changed this morning, dressing is clean dry and intact.  Flush of 60 mL.  Bed and chair alarms on at all times, provided verbal reminders to pt to call before standing.  He was generally compliant, but 3 times he moved without calling.  Pt denies pain, dizziness and shortness of breath this shift.  Pt given IV diuretic at 1600, voided 1600mL between 1630 and 2200.  Intake this shift was 780mL.  Cleansed groin wound and applied barrier cream.  No blister on back of calf observed on assessment.  L PIV patent and saline locked. VSS

## 2022-01-27 ENCOUNTER — PATIENT OUTREACH (OUTPATIENT)
Dept: OTHER | Age: 67
End: 2022-01-27

## 2022-01-27 NOTE — PROGRESS NOTES
HPRP f/u:  Telephone attempt to contact patient for Health Promotion and Risk Prevention. Left discreet message on voicemail with this CC contact information. Will follow for one month for transitions of care needs. Next outreach is 2/17/22 for discussion f/u - Migraine and Resolve Episode.

## 2022-02-17 ENCOUNTER — PATIENT OUTREACH (OUTPATIENT)
Dept: OTHER | Age: 67
End: 2022-02-17

## 2022-02-17 NOTE — PROGRESS NOTES
Final call made today to attempt to contact patient. Could not leave a message on voicemail \"Mailbox Full\". Resolving current episode for case management due to patient unable to reach. Patient has not been reached after repeated calls and letters. Letter sent to patient notifying completion of services due to unable to reach. This writer's contact information and information regarding program services included in materials sent.

## 2022-03-18 PROBLEM — F17.210 CIGARETTE NICOTINE DEPENDENCE WITHOUT COMPLICATION: Status: ACTIVE | Noted: 2018-07-12

## 2022-03-19 PROBLEM — Z85.528 HISTORY OF RENAL CELL CARCINOMA: Status: ACTIVE | Noted: 2021-11-18

## 2023-01-17 ENCOUNTER — HOSPITAL ENCOUNTER (EMERGENCY)
Age: 68
Discharge: HOME | End: 2023-01-17
Attending: STUDENT IN AN ORGANIZED HEALTH CARE EDUCATION/TRAINING PROGRAM

## 2023-01-17 ENCOUNTER — APPOINTMENT (OUTPATIENT)
Dept: CT IMAGING | Age: 68
End: 2023-01-17
Attending: PHYSICIAN ASSISTANT

## 2023-01-17 VITALS
TEMPERATURE: 98.1 F | RESPIRATION RATE: 16 BRPM | HEART RATE: 101 BPM | SYSTOLIC BLOOD PRESSURE: 184 MMHG | BODY MASS INDEX: 16.71 KG/M2 | DIASTOLIC BLOOD PRESSURE: 90 MMHG | HEIGHT: 59 IN | WEIGHT: 82.89 LBS | OXYGEN SATURATION: 99 %

## 2023-01-17 DIAGNOSIS — F17.200 SMOKING ADDICTION: ICD-10-CM

## 2023-01-17 DIAGNOSIS — S09.90XA MINOR HEAD INJURY, INITIAL ENCOUNTER: ICD-10-CM

## 2023-01-17 DIAGNOSIS — I10 ESSENTIAL HYPERTENSION: ICD-10-CM

## 2023-01-17 DIAGNOSIS — S00.03XA CONTUSION OF SCALP, INITIAL ENCOUNTER: Primary | ICD-10-CM

## 2023-01-17 PROCEDURE — 99284 EMERGENCY DEPT VISIT MOD MDM: CPT

## 2023-01-17 PROCEDURE — 70450 CT HEAD/BRAIN W/O DYE: CPT

## 2023-01-17 NOTE — ED PROVIDER NOTES
[facility] EMERGENCY DEPT  EMERGENCY DEPARTMENT ENCOUNTER       Pt Name: Chente Callaway  MRN: [id number]  [place] 1955  Date of evaluation: 1/17/2023  Provider: MITCHELL Pritchard   PCP: Philippe Ruano NP  Note Started: 5:56 PM 1/17/23     CHIEF COMPLAINT       Chief Complaint   Patient presents with    Head Injury     Patient reports falling and hitting back of head 2 weeks ago and again 1 week ago; she reports \"just not feel right since\" and reports \"a knot is still there\"        HISTORY OF PRESENT ILLNESS: 1 or more elements      History From: Patient  HPI Limitations : None     Chetne Callaway is a 78 y.o. female with a past medical history of HTN, COPD, GERD, fibromyalgia, migraines, kidney cancer in remission who presents ambulatory with a couple weeks of mild but constant slight headache and \"just not feeling right since\" she had a fall about 2 weeks ago. She tells me 2 weeks ago she tripped on a toy in her house and bumped her head. There was no loss of consciousness but it did cause a contusion to her scalp. She denies any neck pain. Tells me a week ago she was in a store and had an episode of dizziness. There was no fall and she did not strike her head and symptoms resolved. She tells me that she works at [employer] as a  and did go to work today but cannot shake the feeling and presents to the emergency room to [employer] checked out\". She clearly denies dizziness. She denies any chest pain or shortness of breath. She denies any nausea or vomiting. It is observed that her blood pressure is high and she tells me she did not take her blood pressure medicine today. I asked her if she has blood pressure medication at home and she answers affirmatively. She endorses primary care and reliable transportation. She tells me she is due back to work this morning. Nursing Notes were all reviewed and agreed with or any disagreements were addressed in the HPI.      REVIEW OF SYSTEMS Review of Systems   Constitutional:  Negative for fever. HENT:  Negative for sore throat. Eyes:  Negative for pain and visual disturbance. Respiratory:  Negative for shortness of breath. Cardiovascular:  Negative for chest pain. Gastrointestinal:  Negative for abdominal pain, nausea and vomiting. Genitourinary:  Negative for flank pain. Musculoskeletal:  Negative for back pain and neck pain. Skin:  Negative for rash. Neurological:  Positive for headaches. Negative for dizziness. Positives and Pertinent negatives as per HPI. PAST HISTORY     Past Medical History:  Past Medical History:   Diagnosis Date    Chronic leg pain 11/3/2010    fibromyalgia    COPD (chronic obstructive pulmonary disease) ([facility] [address] 9/18/2009    GERD (gastroesophageal reflux disease)     History of kidney cancer     Hypertension 9/18/2009    Left renal mass 6/11/2014    Leukopenia 9/18/2009    Leukopenia 9/18/2009    Followed by Dr Severo Rudolph     Other ill-defined conditions(799.89)     fibromyalgia    Regular astigmatism 6/28/2016    Vitamin D deficiency 7/7/2010       Past Surgical History:  Past Surgical History:   Procedure Laterality Date    HX GYN      hysterectomy    HX HYSTERECTOMY      HX OOPHORECTOMY      HX ORTHOPAEDIC  06/07/2019    plantars wart removal        Family History:  Family History   Problem Relation Age of Onset    Diabetes Mother     Alcohol abuse Father     Cancer Sister         breast    Breast Cancer Sister         onset; around 54    Cancer Brother         breast    Breast Cancer Brother         onset: around 36    No Known Problems Sister     No Known Problems Brother        Social History:  Social History     Tobacco Use    Smoking status: Every Day     Packs/day: 0.50     Types: Cigarettes    Smokeless tobacco: Never   Vaping Use    Vaping Use: Never used   Substance Use Topics    Alcohol use: Yes     Comment: occasionally    Drug use: No       Allergies:   Allergies   Allergen Reactions Lisinopril Cough     Other reaction(s): Adverse reaction to substance     Lipitor [Atorvastatin] Vertigo       CURRENT MEDICATIONS      Discharge Medication List as of 1/17/2023  6:33 PM        CONTINUE these medications which have NOT CHANGED    Details   methylPREDNISolone (Medrol, Zack,) 4 mg tablet Take as directed on dosage pack, Normal, Disp-1 Dose Pack,R-0      valsartan (DIOVAN) 160 mg tablet Take 1 Tab by mouth daily. , Normal, Disp-180 Tab,R-3      amLODIPine (NORVASC) 10 mg tablet Take 1 Tab by mouth daily. , Normal, Disp-90 Tab,R-3             PHYSICAL EXAM      ED Triage Vitals   ED Encounter Vitals Group      BP 01/17/23 1639 (!) 184/90      Pulse (Heart Rate) 01/17/23 1639 (!) 101      Resp Rate 01/17/23 1639 16      Temp 01/17/23 1639 98.1 °F (36.7 °C)      Temp src --       O2 Sat (%) 01/17/23 1639 99 %      Weight 01/17/23 1641 82 lb 14.3 oz      Height 01/17/23 1641 4' 11\"        Physical Exam  Vitals and nursing note reviewed. Constitutional:       General: She is not in acute distress. Comments: Strong smell of cigarettes. Pleasant. Articulate. No distress. HENT:      Head: Normocephalic. Contusion present. Comments: There is a small contusion that is of the posterior scalp without redness or break in the skin. Right Ear: External ear normal.      Left Ear: External ear normal.      Nose: Nose normal.   Eyes:      General: Vision grossly intact. Conjunctiva/sclera: Conjunctivae normal.   Cardiovascular:      Rate and Rhythm: Normal rate. Pulmonary:      Effort: Pulmonary effort is normal.   Musculoskeletal:         General: Normal range of motion. Cervical back: No tenderness. Comments:   Neck/cervical spine is supple in all planes and there is no midline tenderness   Skin:     Findings: No rash. Neurological:      Mental Status: She is oriented to person, place, and time. She is not disoriented. Sensory: Sensation is intact.       Motor: Motor function is intact. Gait: Gait normal.      Comments:   Good facial symmetry  Normal speech  Normal mentation  No focal deficit   Psychiatric:         Speech: Speech normal.        DIAGNOSTIC RESULTS   LABS:     No results found for this or any previous visit (from the past 12 hour(s)). EKG: When ordered, EKG's are interpreted by the Emergency Department Physician in the absence of a cardiologist.  Please see their note for interpretation of EKG. Interpretation per the Radiologist below, if available at the time of this note:     CT HEAD WO CONT    Result Date: 1/17/2023  INDICATION: fall; head pain Exam: Noncontrast CT of the brain is performed with 5 mm collimation. CT dose reduction was achieved with the use of the standardized protocol tailored for this examination and automatic exposure control for dose modulation. Direct comparisons made to prior CT dated November 2006. FINDINGS: There is no acute intracranial hemorrhage, mass, mass effect or herniation. Ventricular system is normal. The gray-white matter differentiation is well-preserved. The mastoid air cells are well pneumatized. The visualized paranasal sinuses are normal.     No acute intracranial hemorrhage, mass or infarct.         PROCEDURES   Unless otherwise noted below, none  Procedures     CRITICAL CARE TIME   None    EMERGENCY DEPARTMENT COURSE and DIFFERENTIAL DIAGNOSIS/MDM   Vitals:    Vitals:    01/17/23 1639 01/17/23 1641   BP: (!) 184/90    Pulse: (!) 101    Resp: 16    Temp: 98.1 °F (36.7 °C)    SpO2: 99%    Weight:  37.6 kg (82 lb 14.3 oz)   Height:  4' 11\" (1.499 m)        Patient was given the following medications:  Medications - No data to display    CONSULTS: (Who and What was discussed)  None    Chronic Conditions: HTN, migraines, fibromyalgia, COPD, smoking addiction    Social Determinants affecting Dx or Tx: None    Records Reviewed (source and summary of external records): Nursing Notes and Old Medical Records    CC/HPI Summary, DDx, ED Course, and Reassessment: DDx: Skull fracture, ICH, minor head injury, HTN, migraines, smoking addiction    Afebrile and well-appearing. Patient presents with slight headache and feeling \"off\" over the past several weeks since hitting her head about 2 weeks ago. On exam, she is pleasant, articulate and demonstrates no focal neurological deficit. She has no neck pain and her neck is supple without midline tenderness. It is observed that her blood pressure is high and she endorses not taking her blood pressure medicine today. She assures me she has medication at home. CT of the head is negative for acute process. Additional testing deferred. She is discharged and encouraged to take her blood pressure medication once at home. She is urged to quit smoking cigarettes. She is offered a note for work for the next couple of days. She is encouraged to follow-up with her primary care following the emergency room visit. Disposition Considerations (Tests not done, Shared Decision Making, Pt Expectation of Test or Tx.): She and I did discuss additional testing, such as blood work, urinalysis and EKG. Patient denies any other associated symptoms and believes she does not need testing in the emergency department. Through shared decision-making, additional testing is deferred. FINAL IMPRESSION     1. Contusion of scalp, initial encounter    2. Minor head injury, initial encounter    3. Essential hypertension    4.  Smoking addiction          DISPOSITION/PLAN   Discharged     PATIENT REFERRED TO:  Follow-up Information       Follow up With Specialties Details Why Contact Padmaja Alvarez NP Nurse Practitioner Call  PRIMARY CARE: call to schedule follow up after your ER visit Maru Davison  [address] [facility]  [phone]                DISCHARGE MEDICATIONS:  Discharge Medication List as of 1/17/2023  6:33 PM            DISCONTINUED MEDICATIONS:  Discharge Medication List as of 1/17/2023  6:33 PM          Shared Not Shared SUBHASH: I have seen and evaluated the patient. My supervision physician was available for consultation. I am the Primary Clinician of Record. MITCHELL Navarro (electronically signed)    (Please note that parts of this dictation were completed with voice recognition software. Quite often unanticipated grammatical, syntax, homophones, and other interpretive errors are inadvertently transcribed by the computer software. Please disregards these errors.  Please excuse any errors that have escaped final proofreading.)

## 2023-01-17 NOTE — LETTER
[address]  [facility] EMERGENCY DEPT  [address] [place] [id number] [phone]    Work/School Note    Date: 1/17/2023    To Whom It May concern:    Abram Sheppard was seen and treated today in the emergency room by the following provider(s):  Attending Provider: Dario Hackett MD  Physician Assistant: MITCHELL Pillai. Abram Sheppard may return to work on [phone].     Sincerely,          MITCHELL Carey

## 2024-02-13 ENCOUNTER — HOSPITAL ENCOUNTER (EMERGENCY)
Facility: HOSPITAL | Age: 69
Discharge: HOME | End: 2024-02-13
Payer: MEDICARE

## 2024-02-13 VITALS
TEMPERATURE: 98.6 F | SYSTOLIC BLOOD PRESSURE: 184 MMHG | OXYGEN SATURATION: 100 % | RESPIRATION RATE: 16 BRPM | HEIGHT: 59 IN | HEART RATE: 72 BPM | BODY MASS INDEX: 17.34 KG/M2 | WEIGHT: 86 LBS | DIASTOLIC BLOOD PRESSURE: 74 MMHG

## 2024-02-13 DIAGNOSIS — I10 ESSENTIAL HYPERTENSION: ICD-10-CM

## 2024-02-13 DIAGNOSIS — H66.002 NON-RECURRENT ACUTE SUPPURATIVE OTITIS MEDIA OF LEFT EAR WITHOUT SPONTANEOUS RUPTURE OF TYMPANIC MEMBRANE: ICD-10-CM

## 2024-02-13 DIAGNOSIS — Z76.0 ENCOUNTER FOR MEDICATION REFILL: ICD-10-CM

## 2024-02-13 DIAGNOSIS — H61.22 IMPACTED CERUMEN OF LEFT EAR: Primary | ICD-10-CM

## 2024-02-13 DIAGNOSIS — H65.92 FLUID LEVEL BEHIND TYMPANIC MEMBRANE OF LEFT EAR: ICD-10-CM

## 2024-02-13 PROCEDURE — 69210 REMOVE IMPACTED EAR WAX UNI: CPT

## 2024-02-13 PROCEDURE — 99283 EMERGENCY DEPT VISIT LOW MDM: CPT

## 2024-02-13 RX ORDER — VALSARTAN 160 MG/1
160 TABLET ORAL DAILY
Qty: 30 TABLET | Refills: 0 | Status: SHIPPED | OUTPATIENT
Start: 2024-02-13

## 2024-02-13 RX ORDER — AMOXICILLIN AND CLAVULANATE POTASSIUM 875; 125 MG/1; MG/1
1 TABLET, FILM COATED ORAL 2 TIMES DAILY
Qty: 14 TABLET | Refills: 0 | Status: SHIPPED | OUTPATIENT
Start: 2024-02-13 | End: 2024-02-20

## 2024-02-13 RX ORDER — AMLODIPINE BESYLATE 10 MG/1
10 TABLET ORAL DAILY
Qty: 30 TABLET | Refills: 0 | Status: SHIPPED | OUTPATIENT
Start: 2024-02-13

## 2024-02-13 NOTE — ED TRIAGE NOTES
Attempted to verify valsartan and amlodipine doses by calling two pharmacies. Unable to verify.  Last pharmacy at which they were filled is closed for the day

## 2024-02-13 NOTE — ED PROVIDER NOTES
Cardiovascular:  Negative for chest pain and palpitations.   Gastrointestinal:  Negative for abdominal pain, diarrhea, nausea and vomiting.   Musculoskeletal:  Negative for arthralgias, back pain, gait problem, myalgias, neck pain and neck stiffness.   Skin:  Negative for rash and wound.   Neurological:  Negative for dizziness, seizures, syncope, weakness, light-headedness and headaches.   Psychiatric/Behavioral:  Negative for confusion.         Positives and Pertinent negatives as per HPI.    PAST HISTORY     Past Medical History:  Past Medical History:   Diagnosis Date    Chronic leg pain 11/3/2010    fibromyalgia    COPD (chronic obstructive pulmonary disease) ([facility]) 9/18/2009    GERD (gastroesophageal reflux disease)     History of kidney cancer     Hypertension 9/18/2009    Left renal mass 6/11/2014    Leukopenia 9/18/2009    Leukopenia 9/18/2009    Followed by Dr monzon     Other ill-defined conditions(799.89)     fibromyalgia    Regular astigmatism 6/28/2016    Vitamin D deficiency 7/7/2010       Past Surgical History:  Past Surgical History:   Procedure Laterality Date    GYN      hysterectomy    HYSTERECTOMY (CERVIX STATUS UNKNOWN)      ORTHOPEDIC SURGERY  06/07/2019    plantars wart removal     OVARY REMOVAL         Family History:  Family History   Problem Relation Age of Onset    Breast Cancer Brother         onset: around 40    No Known Problems Sister     Cancer Brother         breast    Breast Cancer Sister         onset; around 55    Cancer Sister         breast    Alcohol Abuse Father     Diabetes Mother     No Known Problems Brother        Social History:  Social History     Tobacco Use    Smoking status: Every Day     Current packs/day: 0.50     Types: Cigarettes    Smokeless tobacco: Never   Substance Use Topics    Alcohol use: Yes    Drug use: No       Allergies:  Allergies   Allergen Reactions    Lisinopril Cough     Other reaction(s): Adverse reaction to substance     Atorvastatin Dizziness

## 2024-02-13 NOTE — ED TRIAGE NOTES
Pt reports x3 weeks having bilateral ear fullness, pain and decreased hearing.     Pt also reports needing refill on HTM meds. Pt reports moving recently and misplacing her meds. Pt denies any vision changes, headaches or chest pain.

## 2024-04-15 ENCOUNTER — OFFICE VISIT (OUTPATIENT)
Facility: CLINIC | Age: 69
End: 2024-04-15
Payer: MEDICARE

## 2024-04-15 VITALS
BODY MASS INDEX: 17.14 KG/M2 | OXYGEN SATURATION: 100 % | HEART RATE: 84 BPM | TEMPERATURE: 98.5 F | DIASTOLIC BLOOD PRESSURE: 95 MMHG | SYSTOLIC BLOOD PRESSURE: 177 MMHG | RESPIRATION RATE: 19 BRPM | WEIGHT: 85 LBS | HEIGHT: 59 IN

## 2024-04-15 DIAGNOSIS — I1A.0 RESISTANT HYPERTENSION: ICD-10-CM

## 2024-04-15 DIAGNOSIS — D17.1 LIPOMA OF ABDOMINAL WALL: ICD-10-CM

## 2024-04-15 DIAGNOSIS — R06.09 DYSPNEA ON EXERTION: ICD-10-CM

## 2024-04-15 DIAGNOSIS — Z00.00 MEDICARE ANNUAL WELLNESS VISIT, SUBSEQUENT: Primary | ICD-10-CM

## 2024-04-15 DIAGNOSIS — R07.89 INTERMITTENT LEFT-SIDED CHEST PAIN: ICD-10-CM

## 2024-04-15 DIAGNOSIS — D22.9 ATYPICAL NEVI: ICD-10-CM

## 2024-04-15 DIAGNOSIS — Z11.59 NEED FOR HEPATITIS C SCREENING TEST: ICD-10-CM

## 2024-04-15 DIAGNOSIS — E55.9 VITAMIN D DEFICIENCY: ICD-10-CM

## 2024-04-15 DIAGNOSIS — Z79.899 OTHER LONG TERM (CURRENT) DRUG THERAPY: ICD-10-CM

## 2024-04-15 DIAGNOSIS — Z12.31 ENCOUNTER FOR SCREENING MAMMOGRAM FOR MALIGNANT NEOPLASM OF BREAST: ICD-10-CM

## 2024-04-15 PROCEDURE — 1123F ACP DISCUSS/DSCN MKR DOCD: CPT | Performed by: NURSE PRACTITIONER

## 2024-04-15 PROCEDURE — 3080F DIAST BP >= 90 MM HG: CPT | Performed by: NURSE PRACTITIONER

## 2024-04-15 PROCEDURE — 99214 OFFICE O/P EST MOD 30 MIN: CPT | Performed by: NURSE PRACTITIONER

## 2024-04-15 PROCEDURE — 3077F SYST BP >= 140 MM HG: CPT | Performed by: NURSE PRACTITIONER

## 2024-04-15 PROCEDURE — G0439 PPPS, SUBSEQ VISIT: HCPCS | Performed by: NURSE PRACTITIONER

## 2024-04-15 RX ORDER — VALSARTAN 160 MG/1
160 TABLET ORAL DAILY
Qty: 30 TABLET | Refills: 0 | Status: CANCELLED | OUTPATIENT
Start: 2024-04-15

## 2024-04-15 RX ORDER — VALSARTAN AND HYDROCHLOROTHIAZIDE 320; 12.5 MG/1; MG/1
1 TABLET, FILM COATED ORAL DAILY
Qty: 30 TABLET | Refills: 5 | Status: SHIPPED | OUTPATIENT
Start: 2024-04-15

## 2024-04-15 RX ORDER — AMLODIPINE BESYLATE 10 MG/1
10 TABLET ORAL DAILY
Qty: 90 TABLET | Refills: 3 | Status: SHIPPED | OUTPATIENT
Start: 2024-04-15

## 2024-04-15 SDOH — ECONOMIC STABILITY: HOUSING INSECURITY
IN THE LAST 12 MONTHS, WAS THERE A TIME WHEN YOU DID NOT HAVE A STEADY PLACE TO SLEEP OR SLEPT IN A SHELTER (INCLUDING NOW)?: NO

## 2024-04-15 SDOH — ECONOMIC STABILITY: FOOD INSECURITY: WITHIN THE PAST 12 MONTHS, YOU WORRIED THAT YOUR FOOD WOULD RUN OUT BEFORE YOU GOT MONEY TO BUY MORE.: NEVER TRUE

## 2024-04-15 SDOH — ECONOMIC STABILITY: FOOD INSECURITY: WITHIN THE PAST 12 MONTHS, THE FOOD YOU BOUGHT JUST DIDN'T LAST AND YOU DIDN'T HAVE MONEY TO GET MORE.: NEVER TRUE

## 2024-04-15 SDOH — ECONOMIC STABILITY: INCOME INSECURITY: HOW HARD IS IT FOR YOU TO PAY FOR THE VERY BASICS LIKE FOOD, HOUSING, MEDICAL CARE, AND HEATING?: NOT HARD AT ALL

## 2024-04-15 ASSESSMENT — PATIENT HEALTH QUESTIONNAIRE - PHQ9
SUM OF ALL RESPONSES TO PHQ9 QUESTIONS 1 & 2: 0
SUM OF ALL RESPONSES TO PHQ QUESTIONS 1-9: 0
SUM OF ALL RESPONSES TO PHQ QUESTIONS 1-9: 0
1. LITTLE INTEREST OR PLEASURE IN DOING THINGS: NOT AT ALL
SUM OF ALL RESPONSES TO PHQ QUESTIONS 1-9: 0
2. FEELING DOWN, DEPRESSED OR HOPELESS: NOT AT ALL
SUM OF ALL RESPONSES TO PHQ QUESTIONS 1-9: 0

## 2024-04-15 ASSESSMENT — LIFESTYLE VARIABLES
HOW OFTEN DURING THE LAST YEAR HAVE YOU FAILED TO DO WHAT WAS NORMALLY EXPECTED FROM YOU BECAUSE OF DRINKING: NEVER
HAVE YOU OR SOMEONE ELSE BEEN INJURED AS A RESULT OF YOUR DRINKING: NO
HOW OFTEN DURING THE LAST YEAR HAVE YOU NEEDED AN ALCOHOLIC DRINK FIRST THING IN THE MORNING TO GET YOURSELF GOING AFTER A NIGHT OF HEAVY DRINKING: NEVER
HOW OFTEN DO YOU HAVE A DRINK CONTAINING ALCOHOL: 2-3 TIMES A WEEK
HAS A RELATIVE, FRIEND, DOCTOR, OR ANOTHER HEALTH PROFESSIONAL EXPRESSED CONCERN ABOUT YOUR DRINKING OR SUGGESTED YOU CUT DOWN: NO
HOW MANY STANDARD DRINKS CONTAINING ALCOHOL DO YOU HAVE ON A TYPICAL DAY: 1 OR 2
HOW OFTEN DURING THE LAST YEAR HAVE YOU BEEN UNABLE TO REMEMBER WHAT HAPPENED THE NIGHT BEFORE BECAUSE YOU HAD BEEN DRINKING: NEVER
HOW OFTEN DURING THE LAST YEAR HAVE YOU HAD A FEELING OF GUILT OR REMORSE AFTER DRINKING: NEVER

## 2024-04-15 NOTE — PROGRESS NOTES
Medicare Annual Wellness Visit    Miya Chavira is here for Medicare AWV and Skin Problem (Pt states that she has an area on her calf that is starting to hurt, and the cyst she had on left side is trying to come back )    Assessment & Plan   Medicare annual wellness visit, subsequent  Resistant hypertension  -     amLODIPine (NORVASC) 10 MG tablet; Take 1 tablet by mouth daily, Disp-90 tablet, R-3Normal  -     [facility] - Thanh Lopez NP, CardiologyLeonardo ([address])  -     valsartan-hydroCHLOROthiazide (DIOVAN-HCT) 320-12.5 MG per tablet; Take 1 tablet by mouth daily, Disp-30 tablet, R-5Normal  -     Lipid Panel; Future  -     TSH; Future  -     CBC with Auto Differential; Future  -     Comprehensive Metabolic Panel; Future  -     Brain Natriuretic Peptide; Future  -     Echo (TTE) complete (PRN contrast/bubble/strain/3D); Future  BMI less than 19,adult  -     Lipid Panel; Future  -     Hemoglobin A1C; Future  Vitamin D deficiency  Intermittent left-sided chest pain  -     [facility] - Thanh Lopez NP, CardiologyLeonardo ([address])  -     TSH; Future  -     CBC with Auto Differential; Future  -     Comprehensive Metabolic Panel; Future  -     Brain Natriuretic Peptide; Future  -     Echo (TTE) complete (PRN contrast/bubble/strain/3D); Future  -     CK; Future  -     High sensitivity CRP; Future  Lipoma of abdominal wall  Need for hepatitis C screening test  -     Hepatitis C Virus (HCV) RNA, Diagnosis; Future  Encounter for screening mammogram for malignant neoplasm of breast  -     [facility] SOLE DIGITAL SCREEN BILATERAL; Future  Other long term (current) drug therapy  -     Hemoglobin A1C; Future  Dyspnea on exertion  -     Brain Natriuretic Peptide; Future  Atypical nevi  -     Amb External Referral To Dermatology  Recommendations for Preventive Services Due: see orders and patient instructions/AVS.  Recommended screening schedule for the next 5-10 years is provided to the patient in written form: see Patient

## 2024-04-15 NOTE — PROGRESS NOTES
Pt is here for   Chief Complaint   Patient presents with    Medicare AWV    Skin Problem     Pt states that she has an area on her calf that is starting to hurt, and the cyst she had on left side is trying to come back      \"Have you been to the ER, urgent care clinic since your last visit?  Hospitalized since your last visit?\"    NO    “Have you seen or consulted any other health care providers outside of [facility] since your last visit?”    NO    Have you had a mammogram?”   NO    Date of last Mammogram: 7/19/2018         “Have you had a colorectal cancer screening such as a colonoscopy/FIT/Cologuard?    NO    Date of last Colonoscopy: 3/11/2014  No cologuard on file  No FIT/FOBT on file   No flexible sigmoidoscopy on file         Click Here for Release of Records Request

## 2024-04-16 LAB
ALBUMIN SERPL-MCNC: 4.4 G/DL (ref 3.9–4.9)
ALBUMIN/GLOB SERPL: 1.6 {RATIO} (ref 1.2–2.2)
ALP SERPL-CCNC: 89 IU/L (ref 44–121)
ALT SERPL-CCNC: 14 IU/L (ref 0–32)
AST SERPL-CCNC: 19 IU/L (ref 0–40)
BASOPHILS # BLD AUTO: 0.1 X10E3/UL (ref 0–0.2)
BASOPHILS NFR BLD AUTO: 1 %
BILIRUB SERPL-MCNC: 0.3 MG/DL (ref 0–1.2)
BNP SERPL-MCNC: 13.3 PG/ML (ref 0–100)
BUN SERPL-MCNC: 18 MG/DL (ref 8–27)
BUN/CREAT SERPL: 25 (ref 12–28)
CALCIUM SERPL-MCNC: 9.7 MG/DL (ref 8.7–10.3)
CHLORIDE SERPL-SCNC: 101 MMOL/L (ref 96–106)
CHOLEST SERPL-MCNC: 200 MG/DL (ref 100–199)
CK SERPL-CCNC: 158 U/L (ref 32–182)
CO2 SERPL-SCNC: 27 MMOL/L (ref 20–29)
CREAT SERPL-MCNC: 0.72 MG/DL (ref 0.57–1)
CRP SERPL HS-MCNC: 0.5 MG/L (ref 0–3)
EGFRCR SERPLBLD CKD-EPI 2021: 92 ML/MIN/1.73
EOSINOPHIL # BLD AUTO: 0.1 X10E3/UL (ref 0–0.4)
EOSINOPHIL NFR BLD AUTO: 1 %
ERYTHROCYTE [DISTWIDTH] IN BLOOD BY AUTOMATED COUNT: 17.1 % (ref 11.7–15.4)
GLOBULIN SER CALC-MCNC: 2.8 G/DL (ref 1.5–4.5)
GLUCOSE SERPL-MCNC: 87 MG/DL (ref 70–99)
HBA1C MFR BLD: 5.6 % (ref 4.8–5.6)
HCT VFR BLD AUTO: 54.3 % (ref 34–46.6)
HDLC SERPL-MCNC: 74 MG/DL
HGB BLD-MCNC: 17.1 G/DL (ref 11.1–15.9)
IMM GRANULOCYTES # BLD AUTO: 0 X10E3/UL (ref 0–0.1)
IMM GRANULOCYTES NFR BLD AUTO: 0 %
LDLC SERPL CALC-MCNC: 106 MG/DL (ref 0–99)
LYMPHOCYTES # BLD AUTO: 1.8 X10E3/UL (ref 0.7–3.1)
LYMPHOCYTES NFR BLD AUTO: 35 %
MCH RBC QN AUTO: 25 PG (ref 26.6–33)
MCHC RBC AUTO-ENTMCNC: 31.5 G/DL (ref 31.5–35.7)
MCV RBC AUTO: 80 FL (ref 79–97)
MONOCYTES # BLD AUTO: 0.5 X10E3/UL (ref 0.1–0.9)
MONOCYTES NFR BLD AUTO: 9 %
NEUTROPHILS # BLD AUTO: 2.8 X10E3/UL (ref 1.4–7)
NEUTROPHILS NFR BLD AUTO: 54 %
PLATELET # BLD AUTO: 286 X10E3/UL (ref 150–450)
POTASSIUM SERPL-SCNC: 4.5 MMOL/L (ref 3.5–5.2)
PROT SERPL-MCNC: 7.2 G/DL (ref 6–8.5)
RBC # BLD AUTO: 6.83 X10E6/UL (ref 3.77–5.28)
SODIUM SERPL-SCNC: 142 MMOL/L (ref 134–144)
TRIGL SERPL-MCNC: 112 MG/DL (ref 0–149)
TSH SERPL DL<=0.005 MIU/L-ACNC: 2.08 UIU/ML (ref 0.45–4.5)
VLDLC SERPL CALC-MCNC: 20 MG/DL (ref 5–40)
WBC # BLD AUTO: 5.2 X10E3/UL (ref 3.4–10.8)

## 2024-04-17 LAB
HCV RNA SERPL NAA+PROBE-ACNC: NORMAL IU/ML
IMP & REVIEW OF LAB RESULTS: NORMAL
TEST INFORMATION: NORMAL

## 2024-04-17 NOTE — RESULT ENCOUNTER NOTE
Overall your labs look good, but...    Your CHOLESTEROL is UP, with your LDL (bad cholesterol) over  goal level of 100 and HDL (good/protective) under 60. Stay active, eat a LOW-CARB diet (avoiding bread, rice, pasta, desserts, soda)  with fish and other lean meats, take a fish oil supplement with DHA and EPA daily, along with exercise to help reduce cholesterol and raise good cholesterol. Eating healthy nuts like walnuts, pecans, and ALMONDS may help also. You can also try PURPLE SEA SPAIN (found in [employer] food stores like [employer] and [employer]). TRY RED YEAST RICE (if your LDL is HIGH) OR Flush-free NIACIN 500mg (if your TRIG or up), once daily helps to improve your numbers too. Do NOT take Niacin or red yeast rice together, they interact with each other. Both are intended to be taken WITH FISH OIL however.     All your other labs are NORMAL.

## 2024-04-19 DIAGNOSIS — I1A.0 RESISTANT HYPERTENSION: Primary | ICD-10-CM

## 2024-04-19 RX ORDER — CLONIDINE HYDROCHLORIDE 0.1 MG/1
0.1 TABLET ORAL EVERY 8 HOURS PRN
Qty: 30 TABLET | Refills: 2 | Status: SHIPPED | OUTPATIENT
Start: 2024-04-19

## 2024-04-22 ENCOUNTER — HOSPITAL ENCOUNTER (OUTPATIENT)
Facility: HOSPITAL | Age: 69
Discharge: HOME | End: 2024-04-25
Payer: MEDICARE

## 2024-04-22 VITALS — WEIGHT: 85 LBS | HEIGHT: 59 IN | BODY MASS INDEX: 17.14 KG/M2

## 2024-04-22 DIAGNOSIS — Z12.31 ENCOUNTER FOR SCREENING MAMMOGRAM FOR MALIGNANT NEOPLASM OF BREAST: ICD-10-CM

## 2024-04-22 PROCEDURE — 77063 BREAST TOMOSYNTHESIS BI: CPT

## 2024-04-23 ENCOUNTER — OFFICE VISIT (OUTPATIENT)
Age: 69
End: 2024-04-23
Payer: MEDICARE

## 2024-04-23 VITALS
HEART RATE: 78 BPM | DIASTOLIC BLOOD PRESSURE: 84 MMHG | BODY MASS INDEX: 17.34 KG/M2 | OXYGEN SATURATION: 98 % | SYSTOLIC BLOOD PRESSURE: 152 MMHG | HEIGHT: 59 IN | WEIGHT: 86 LBS

## 2024-04-23 DIAGNOSIS — I1A.0 RESISTANT HYPERTENSION: Primary | ICD-10-CM

## 2024-04-23 DIAGNOSIS — I10 HYPERTENSION, UNSPECIFIED TYPE: ICD-10-CM

## 2024-04-23 PROCEDURE — 3079F DIAST BP 80-89 MM HG: CPT | Performed by: NURSE PRACTITIONER

## 2024-04-23 PROCEDURE — 3077F SYST BP >= 140 MM HG: CPT | Performed by: NURSE PRACTITIONER

## 2024-04-23 PROCEDURE — G0403 EKG FOR INITIAL PREVENT EXAM: HCPCS | Performed by: NURSE PRACTITIONER

## 2024-04-23 PROCEDURE — 1123F ACP DISCUSS/DSCN MKR DOCD: CPT | Performed by: NURSE PRACTITIONER

## 2024-04-23 PROCEDURE — 99204 OFFICE O/P NEW MOD 45 MIN: CPT | Performed by: NURSE PRACTITIONER

## 2024-04-23 RX ORDER — VARENICLINE TARTRATE 0.5 MG/1
.5-1 TABLET, FILM COATED ORAL SEE ADMIN INSTRUCTIONS
Qty: 57 TABLET | Refills: 0 | Status: SHIPPED | OUTPATIENT
Start: 2024-04-23

## 2024-04-23 RX ORDER — VALSARTAN AND HYDROCHLOROTHIAZIDE 160; 12.5 MG/1; MG/1
2 TABLET, FILM COATED ORAL DAILY
Qty: 120 TABLET | Refills: 2 | Status: SHIPPED | OUTPATIENT
Start: 2024-04-23

## 2024-04-23 ASSESSMENT — ENCOUNTER SYMPTOMS
VOMITING: 0
SHORTNESS OF BREATH: 0

## 2024-04-23 ASSESSMENT — PATIENT HEALTH QUESTIONNAIRE - PHQ9
2. FEELING DOWN, DEPRESSED OR HOPELESS: NOT AT ALL
SUM OF ALL RESPONSES TO PHQ QUESTIONS 1-9: 0
SUM OF ALL RESPONSES TO PHQ9 QUESTIONS 1 & 2: 0
1. LITTLE INTEREST OR PLEASURE IN DOING THINGS: NOT AT ALL
SUM OF ALL RESPONSES TO PHQ QUESTIONS 1-9: 0

## 2024-04-23 ASSESSMENT — VISUAL ACUITY: OU: 1

## 2024-04-23 NOTE — PROGRESS NOTES
Subjective:   Miya Chavira is a 68 y.o.  female with a past medical history of hypertension (diagnosed in her 40s), chronic tobacco use, COPD, renal cell carcinoma s/p surgical resection, GERD, and fibromyalgia who presents today as a referral from her primary care provider to be evaluated for resistant hypertension.    Patient currently takes the following for high blood pressure with excellent adherence:  12.5 mg HCTZ daily  320 mg of valsartan daily  10 mg amlodipine daily  0.1 mg of clonidine twice daily.    Patient's BP today is better than it has been in the recent past at 152/84.  To her knowledge, she has not undergone workup for secondary hypertension causes.  She has no history of significant hypokalemia.  She does not abuse drugs or alcohol.  Her thyroid function appears normal.  She has no episodic spells of headache, palpitations, diaphoresis, tremors to suggest pheochromocytoma.  Her renal function is normal.    She smokes about half pack of cigarettes daily and started smoking when she was in high school.  She does not drink alcohol or use illicit drugs.  She recently retired from working security at [employer].    For the most part, she feels well.  She does endorse several months of intermittent unprovoked left-sided chest pain this seems to happen at random times without any clear aggravating or alleviating factors.  She estimates that the symptoms can last anywhere from few minutes to up to 20 minutes.  She describes it as \"indigestion\" and she actually gestures towards her left mid axillary line beneath her armpit when localizing the discomfort.  She denies any constitutional symptoms of dyspnea, lightheadedness/dizziness, diaphoresis, palpitations, significant fatigue, or upper extremity paresthesia when the pain comes about.    She maintains a decent amount of physical activity without any limiting symptoms.  She occasionally experiences chest pains

## 2024-04-23 NOTE — PATIENT INSTRUCTIONS
Please STOP taking your valsartan HCTZ (320-12.5).    Please START taking 2 TABLETS of the valsartan HCTZ (160-12.5) once daily. This will increase the dose of the HCTZ which should help more with your blood pressure.    I would also like you to try taking Chantix to stop smoking.  This medication works best if you start taking it 1 full week before you try to quit.    It helps to reduce the cravings and hopefully it we will get you off of cigarettes altogether.  You can still smoke cigarettes while taking this medication, it will not cause any harm or side effects.    If you feel like the medication is working well for you please reach out to our office to request a refill before you run out.  If it works well we will plan on keeping you on it for at least 6 months.

## 2024-04-24 ENCOUNTER — TELEPHONE (OUTPATIENT)
Age: 69
End: 2024-04-24

## 2024-04-24 DIAGNOSIS — F17.200 TOBACCO DEPENDENCE WITH CURRENT USE: Primary | ICD-10-CM

## 2024-04-25 ENCOUNTER — HOSPITAL ENCOUNTER (OUTPATIENT)
Facility: HOSPITAL | Age: 69
Discharge: HOME | End: 2024-04-27
Payer: MEDICARE

## 2024-04-25 DIAGNOSIS — R07.89 INTERMITTENT LEFT-SIDED CHEST PAIN: ICD-10-CM

## 2024-04-25 DIAGNOSIS — I1A.0 RESISTANT HYPERTENSION: ICD-10-CM

## 2024-04-25 LAB
ECHO AO ROOT DIAM: 1.9 CM
ECHO AV AREA PLAN: 2.3 CM2
ECHO AV CUSP MM: 2 CM
ECHO EST RA PRESSURE: 5 MMHG
ECHO LA DIAMETER: 2.5 CM
ECHO LA TO AORTIC ROOT RATIO: 1.32
ECHO LA VOL A-L A4C: 31 ML (ref 22–52)
ECHO LA VOL MOD A4C: 31 ML (ref 22–52)
ECHO LV EDV A4C: 80 ML
ECHO LV EJECTION FRACTION A4C: 63 %
ECHO LV ESV A4C: 30 ML
ECHO LV FRACTIONAL SHORTENING: 32 % (ref 28–44)
ECHO LV INTERNAL DIMENSION DIASTOLIC: 3.8 CM (ref 3.9–5.3)
ECHO LV INTERNAL DIMENSION SYSTOLIC: 2.6 CM
ECHO LV IVSD: 0.7 CM (ref 0.6–0.9)
ECHO LV MASS 2D: 101.1 G (ref 67–162)
ECHO LV POSTERIOR WALL DIASTOLIC: 1.1 CM (ref 0.6–0.9)
ECHO LV RELATIVE WALL THICKNESS RATIO: 0.58
ECHO LVOT AREA: 2.3 CM2
ECHO LVOT DIAM: 1.7 CM
ECHO LVOT PEAK GRADIENT: 5 MMHG
ECHO LVOT PEAK VELOCITY: 1.2 M/S
ECHO MV A VELOCITY: 0.43 M/S
ECHO MV AREA PHT: 3.8 CM2
ECHO MV E DECELERATION TIME (DT): 161.6 MS
ECHO MV E VELOCITY: 0.35 M/S
ECHO MV E/A RATIO: 0.81
ECHO MV MAX VELOCITY: 0.6 M/S
ECHO MV MEAN GRADIENT: 1 MMHG
ECHO MV MEAN VELOCITY: 0.3 M/S
ECHO MV PEAK GRADIENT: 2 MMHG
ECHO MV PRESSURE HALF TIME (PHT): 57.5 MS
ECHO MV REGURGITANT PEAK GRADIENT: 31 MMHG
ECHO MV REGURGITANT PEAK VELOCITY: 2.8 M/S
ECHO MV VTI: 19 CM
ECHO PV MAX VELOCITY: 0.8 M/S
ECHO PV PEAK GRADIENT: 3 MMHG
ECHO RIGHT VENTRICULAR SYSTOLIC PRESSURE (RVSP): 35 MMHG
ECHO TV REGURGITANT MAX VELOCITY: 2.74 M/S
ECHO TV REGURGITANT PEAK GRADIENT: 30 MMHG

## 2024-04-25 PROCEDURE — 93306 TTE W/DOPPLER COMPLETE: CPT

## 2024-04-25 RX ORDER — VARENICLINE TARTRATE 1 MG/1
1 TABLET, FILM COATED ORAL 2 TIMES DAILY
Qty: 60 TABLET | Refills: 3 | Status: SHIPPED | OUTPATIENT
Start: 2024-04-25

## 2024-04-25 NOTE — TELEPHONE ENCOUNTER
Incoming fax received from [employer].    Pt has been approved for the request for VARENICLINE 0.5 mg tab.  Approved from 01/25/2024-07/24/2024  Authorization number--[id number]    [facility] [address] notified and spoke with Katherine to give information.      Case number [id number]    Katherine called back from [employer] stating this pt insurance only covers 7 days worth of Varenicline with prescribed dosage .    She requested a new RX be jimmy for Varenicline 1 mg twice a day #60,3 refills.    Verbal ok by Thanh Lopez NP. New RX sent.

## 2024-04-26 ENCOUNTER — TELEPHONE (OUTPATIENT)
Facility: CLINIC | Age: 69
End: 2024-04-26

## 2024-04-26 NOTE — TELEPHONE ENCOUNTER
Pt called in reference to the echo she took yesterday. She's unsure of the results and would like someone to explain it to her.

## 2024-04-30 ENCOUNTER — HOSPITAL ENCOUNTER (OUTPATIENT)
Dept: VASCULAR SURGERY | Facility: HOSPITAL | Age: 69
Discharge: HOME | End: 2024-05-02
Payer: MEDICARE

## 2024-04-30 DIAGNOSIS — I1A.0 RESISTANT HYPERTENSION: ICD-10-CM

## 2024-04-30 DIAGNOSIS — I10 HYPERTENSION, UNSPECIFIED TYPE: ICD-10-CM

## 2024-04-30 PROCEDURE — 93975 VASCULAR STUDY: CPT

## 2024-05-01 LAB
VAS AORTA DIST TR: 0.92 CM
VAS AORTA MID PSV: 86.1 CM/S
VAS AORTA MID TRANS: 1.69 CM
VAS AORTA PROX AP: 1.78 CM
VAS AORTA PROX PSV: 122.3 CM/S
VAS AORTA PROX TR: 1.76 CM
VAS CELIAC EDV: 26.5 CM/S
VAS CELIAC PSV: 152.6 CM/S
VAS LEFT KIDNEY LENGTH: 9.83 CM
VAS LEFT KIDNEY WIDTH: 5.58 CM
VAS LEFT RENAL DIST EDV: 25.9 CM/S
VAS LEFT RENAL DIST PSV: 95.4 CM/S
VAS LEFT RENAL DIST RAR: 1.11
VAS LEFT RENAL DIST RI: 0.73 NO UNITS
VAS LEFT RENAL LOWER PARENCHYMA EDV: 18.7 CM/S
VAS LEFT RENAL LOWER PARENCHYMA PSV: 46.9 CM/S
VAS LEFT RENAL LOWER PARENCHYMA RI: 0.6
VAS LEFT RENAL MID EDV: 39.4 CM/S
VAS LEFT RENAL MID PSV: 150.9 CM/S
VAS LEFT RENAL MID RAR: 1.75
VAS LEFT RENAL MID RI: 0.74 NO UNITS
VAS LEFT RENAL MIDDLE PARENCHYMA EDV: 22.4 CM/S
VAS LEFT RENAL MIDDLE PARENCHYMA PSV: 75.2 CM/S
VAS LEFT RENAL MIDDLE PARENCHYMA RI: 0.7
VAS LEFT RENAL PROX EDV: 42 CM/S
VAS LEFT RENAL PROX PSV: 166.4 CM/S
VAS LEFT RENAL PROX RAR: 1.93
VAS LEFT RENAL PROX RI: 0.75 NO UNITS
VAS LEFT RENAL RAR: 1.93
VAS LEFT RENAL UPPER PARENCHYMA EDV: 13.3 CM/S
VAS LEFT RENAL UPPER PARENCHYMA PSV: 56.9 CM/S
VAS LEFT RENAL UPPER PARENCHYMA RI: 0.77
VAS MID SMA EDV: 62 CM/S
VAS MID SMA PSV: 261.3 CM/S
VAS RIGHT KIDNEY LENGTH: 10.83 CM
VAS RIGHT KIDNEY WIDTH: 5.41 CM
VAS RIGHT RENAL DIST EDV: 25.9 CM/S
VAS RIGHT RENAL DIST PSV: 88 CM/S
VAS RIGHT RENAL DIST RAR: 1.02
VAS RIGHT RENAL DIST RI: 0.71 NO UNITS
VAS RIGHT RENAL LOWER PARENCHYMA EDV: 25.9 CM/S
VAS RIGHT RENAL LOWER PARENCHYMA PSV: 73.4 CM/S
VAS RIGHT RENAL LOWER PARENCHYMA RI: 0.65
VAS RIGHT RENAL MID EDV: 35.1 CM/S
VAS RIGHT RENAL MID PSV: 108.1 CM/S
VAS RIGHT RENAL MID RAR: 1.26
VAS RIGHT RENAL MID RI: 0.68 NO UNITS
VAS RIGHT RENAL MIDDLE PARENCHYMA EDV: 27.8 CM/S
VAS RIGHT RENAL MIDDLE PARENCHYMA PSV: 82.6 CM/S
VAS RIGHT RENAL MIDDLE PARENCHYMA RI: 0.66
VAS RIGHT RENAL PROX EDV: 39.5 CM/S
VAS RIGHT RENAL PROX PSV: 149.4 CM/S
VAS RIGHT RENAL PROX RAR: 1.74
VAS RIGHT RENAL PROX RI: 0.74 NO UNITS
VAS RIGHT RENAL RAR: 1.74
VAS RIGHT RENAL UPPER PARENCHYMA EDV: 26.5 CM/S
VAS RIGHT RENAL UPPER PARENCHYMA PSV: 78.2 CM/S
VAS RIGHT RENAL UPPER PARENCHYMA RI: 0.66

## 2024-05-01 PROCEDURE — 93975 VASCULAR STUDY: CPT

## 2024-05-21 ENCOUNTER — OFFICE VISIT (OUTPATIENT)
Facility: CLINIC | Age: 69
End: 2024-05-21
Payer: MEDICARE

## 2024-05-21 VITALS
SYSTOLIC BLOOD PRESSURE: 138 MMHG | DIASTOLIC BLOOD PRESSURE: 71 MMHG | OXYGEN SATURATION: 100 % | RESPIRATION RATE: 19 BRPM | TEMPERATURE: 97 F | WEIGHT: 86 LBS | HEIGHT: 59 IN | HEART RATE: 71 BPM | BODY MASS INDEX: 17.34 KG/M2

## 2024-05-21 DIAGNOSIS — I1A.0 RESISTANT HYPERTENSION: Primary | ICD-10-CM

## 2024-05-21 DIAGNOSIS — R07.89 INTERMITTENT LEFT-SIDED CHEST PAIN: ICD-10-CM

## 2024-05-21 PROCEDURE — 3075F SYST BP GE 130 - 139MM HG: CPT | Performed by: NURSE PRACTITIONER

## 2024-05-21 PROCEDURE — 3078F DIAST BP <80 MM HG: CPT | Performed by: NURSE PRACTITIONER

## 2024-05-21 PROCEDURE — 1123F ACP DISCUSS/DSCN MKR DOCD: CPT | Performed by: NURSE PRACTITIONER

## 2024-05-21 PROCEDURE — 99213 OFFICE O/P EST LOW 20 MIN: CPT | Performed by: NURSE PRACTITIONER

## 2024-05-21 NOTE — PROGRESS NOTES
Miya Chavira 1955 is a 68 y.o. female, Established patient, here for evaluation of the following chief complaint(s):  Hypertension (4 week follow up med change)      ASSESSMENT/PLAN:  Below is the assessment and plan developed based on review of pertinent history, physical exam, labs, studies, and medications.     Diagnosis Orders   1. Resistant hypertension        2. Intermittent left-sided chest pain              MDM:continue meds as prescribed.      Current medication regimen is effective. See adjustments or prescriptions as noted above. Continue meds as prescribed.          Follow-up and Dispositions    Return in about 3 months (around 8/21/2024) for OV- HTN.         Pt asked to complete follow by next visit: Call if any problems    SUBJECTIVE/OBJECTIVE:  HPI    Hypertension Review:  The patient has hypertension and CP. No longer with CP however.   Diet and Lifestyle: generally does try to follow a  low sodium diet, exercises sporadically   Home BP Monitoring: is measured at home sporadically, 150's but stressed since not lowering quickly. Felt better with boost in energy however.   Pertinent ROS: taking medications as instructed, no medication side effects noted. No HA's, chest pain on exertion, dyspnea on exertion, or swelling of ankles.   BP Readings from Last 3 Encounters:   05/21/24 138/71   04/23/24 (!) 152/84   04/15/24 (!) 177/95       Review of Systems  Constitutional: negative for fevers, chills, anorexia and weight loss  Respiratory:  negative for cough, hemoptysis, dyspnea, and wheezing  CV:   negative for chest pain, palpitations, and lower extremity edema  GI:   negative for nausea, vomiting, diarrhea, abdominal pain, and melena  Endo:               negative for polyuria,polydipsia,polyphagia, and heat intolerance  Genitourinary: negative for frequency, urgency, dysuria, retention, and hematuria  Integument:  negative for rash, ulcerations, and pruritus  Hematologic:  negative for easy

## 2024-10-28 DIAGNOSIS — I1A.0 RESISTANT HYPERTENSION: ICD-10-CM

## 2024-10-30 NOTE — TELEPHONE ENCOUNTER
valsartan-hydroCHLOROthiazide (DIOVAN-HCT) 320-12.5 MG per tablet     The original prescription was discontinued on 4/23/2024 by Thanh Lopez APRN - NP for the following reason: LIST CLEANUP. Renewing this prescription may not be appropriate.

## 2024-11-04 RX ORDER — VALSARTAN AND HYDROCHLOROTHIAZIDE 320; 12.5 MG/1; MG/1
1 TABLET, FILM COATED ORAL DAILY
Qty: 30 TABLET | Refills: 5 | OUTPATIENT
Start: 2024-11-04

## 2024-12-07 DIAGNOSIS — I1A.0 RESISTANT HYPERTENSION: ICD-10-CM

## 2024-12-11 RX ORDER — VALSARTAN AND HYDROCHLOROTHIAZIDE 320; 12.5 MG/1; MG/1
1 TABLET, FILM COATED ORAL DAILY
Qty: 30 TABLET | Refills: 5 | OUTPATIENT
Start: 2024-12-11

## 2025-01-31 RX ORDER — VALSARTAN AND HYDROCHLOROTHIAZIDE 160; 12.5 MG/1; MG/1
2 TABLET, FILM COATED ORAL DAILY
Qty: 60 TABLET | Refills: 0 | Status: SHIPPED | OUTPATIENT
Start: 2025-01-31

## 2025-01-31 NOTE — TELEPHONE ENCOUNTER
Pt left a voice message requesting a refill from PHAN Ordonez. Pt stated is out of medication and has an appointment in February.     [phone]    Last appointment: 05/21/2024 PHAN Ordonez   Next appointment: 02/27/2025 [facility] Ordone[facility]   Previous refill encounter(s):   04/23/2024 Diovan-HCT #120 with 2 refills (2 qd). Last prescribed by Dr. DAVID Lopez.     For Pharmacy Admin Tracking Only    Program: Medication Refill  Intervention Detail: New Rx: 1, reason: Patient Preference  Time Spent (min): 5    Requested Prescriptions     Pending Prescriptions Disp Refills    valsartan-hydroCHLOROthiazide (DIOVAN HCT) 160-12.5 MG per tablet 60 tablet 0     Sig: Take 2 tablets by mouth daily

## 2025-02-27 ENCOUNTER — OFFICE VISIT (OUTPATIENT)
Facility: CLINIC | Age: 70
End: 2025-02-27
Payer: MEDICARE

## 2025-02-27 VITALS
DIASTOLIC BLOOD PRESSURE: 77 MMHG | HEIGHT: 59 IN | WEIGHT: 88 LBS | TEMPERATURE: 98.5 F | SYSTOLIC BLOOD PRESSURE: 146 MMHG | HEART RATE: 77 BPM | OXYGEN SATURATION: 96 % | BODY MASS INDEX: 17.74 KG/M2 | RESPIRATION RATE: 18 BRPM

## 2025-02-27 DIAGNOSIS — I1A.0 RESISTANT HYPERTENSION: Primary | ICD-10-CM

## 2025-02-27 DIAGNOSIS — J30.89 ENVIRONMENTAL AND SEASONAL ALLERGIES: ICD-10-CM

## 2025-02-27 DIAGNOSIS — R20.2 PARESTHESIA: ICD-10-CM

## 2025-02-27 DIAGNOSIS — R19.7 DIARRHEA, UNSPECIFIED TYPE: ICD-10-CM

## 2025-02-27 PROCEDURE — 99214 OFFICE O/P EST MOD 30 MIN: CPT | Performed by: NURSE PRACTITIONER

## 2025-02-27 PROCEDURE — 1159F MED LIST DOCD IN RCRD: CPT | Performed by: NURSE PRACTITIONER

## 2025-02-27 PROCEDURE — 3078F DIAST BP <80 MM HG: CPT | Performed by: NURSE PRACTITIONER

## 2025-02-27 PROCEDURE — 3077F SYST BP >= 140 MM HG: CPT | Performed by: NURSE PRACTITIONER

## 2025-02-27 PROCEDURE — 1123F ACP DISCUSS/DSCN MKR DOCD: CPT | Performed by: NURSE PRACTITIONER

## 2025-02-27 PROCEDURE — 1126F AMNT PAIN NOTED NONE PRSNT: CPT | Performed by: NURSE PRACTITIONER

## 2025-02-27 RX ORDER — FLUTICASONE PROPIONATE 50 MCG
1 SPRAY, SUSPENSION (ML) NASAL NIGHTLY
Qty: 48 G | Refills: 1 | Status: SHIPPED | OUTPATIENT
Start: 2025-02-27

## 2025-02-27 RX ORDER — FAMOTIDINE 40 MG/1
40 TABLET, FILM COATED ORAL EVERY EVENING
Qty: 90 TABLET | Refills: 1 | Status: SHIPPED | OUTPATIENT
Start: 2025-02-27

## 2025-02-27 RX ORDER — VALSARTAN AND HYDROCHLOROTHIAZIDE 160; 12.5 MG/1; MG/1
2 TABLET, FILM COATED ORAL DAILY
Qty: 180 TABLET | Refills: 1 | Status: SHIPPED | OUTPATIENT
Start: 2025-02-27

## 2025-02-27 RX ORDER — AMLODIPINE BESYLATE 10 MG/1
10 TABLET ORAL DAILY
Qty: 90 TABLET | Refills: 3 | Status: SHIPPED | OUTPATIENT
Start: 2025-02-27

## 2025-02-27 SDOH — ECONOMIC STABILITY: FOOD INSECURITY: WITHIN THE PAST 12 MONTHS, THE FOOD YOU BOUGHT JUST DIDN'T LAST AND YOU DIDN'T HAVE MONEY TO GET MORE.: NEVER TRUE

## 2025-02-27 SDOH — ECONOMIC STABILITY: FOOD INSECURITY: WITHIN THE PAST 12 MONTHS, YOU WORRIED THAT YOUR FOOD WOULD RUN OUT BEFORE YOU GOT MONEY TO BUY MORE.: NEVER TRUE

## 2025-02-27 ASSESSMENT — PATIENT HEALTH QUESTIONNAIRE - PHQ9
SUM OF ALL RESPONSES TO PHQ QUESTIONS 1-9: 0
1. LITTLE INTEREST OR PLEASURE IN DOING THINGS: NOT AT ALL
2. FEELING DOWN, DEPRESSED OR HOPELESS: NOT AT ALL
SUM OF ALL RESPONSES TO PHQ QUESTIONS 1-9: 0
SUM OF ALL RESPONSES TO PHQ9 QUESTIONS 1 & 2: 0
SUM OF ALL RESPONSES TO PHQ QUESTIONS 1-9: 0
SUM OF ALL RESPONSES TO PHQ QUESTIONS 1-9: 0

## 2025-02-27 NOTE — PROGRESS NOTES
Miya Chavira 1955 is a 69 y.o. female, Established patient, here for evaluation of the following chief complaint(s):  Follow-up ( HTN.) and Tingling (Of left arm and leg)      ASSESSMENT/PLAN:  Below is the assessment and plan developed based on review of pertinent history, physical exam, labs, studies, and medications.       Diagnosis Orders   1. Resistant hypertension  valsartan-hydroCHLOROthiazide (DIOVAN HCT) 160-12.5 MG per tablet    amLODIPine (NORVASC) 10 MG tablet      2. Paresthesia  Hemoglobin A1C    Vitamin B12    Magnesium    CBC with Auto Differential    TSH    Iron and TIBC    Ferritin      3. Environmental and seasonal allergies  fluticasone (FLONASE) 50 MCG/ACT nasal spray    famotidine (PEPCID) 40 MG tablet      4. Diarrhea, unspecified type            HCC Dx review: n/a    Specific pt instructions until next visit: call if any problems, HTN near goal, asked to monitor at home more. May consider aldactone 25 mg. Trial of flonase and pepcid for symptoms. Labs ordered for paresthesia, if no abnormalities will order imaging.    Current medication regimen is partially effective. See adjustments or prescriptions as noted above. Continue meds as prescribed.          Follow-up and Dispositions    Return in about 3 months (around 5/27/2025) for OV-HTN, paresthesia, allx.           SUBJECTIVE/OBJECTIVE:  HPI    Hypertension Review:  The patient has hypertension  Diet and Lifestyle: generally does try to follow a  low sodium diet, exercises regularly by walking and cycling mostly, also rock climbs, cartwheeling, and tree climbing.  Home BP Monitoring: is not measured at home.  Pertinent ROS: taking medications as instructed, no medication side effects noted. No HA's, chest pain on exertion, dyspnea on exertion, or swelling of ankles.   BP Readings from Last 3 Encounters:   02/27/25 (!) 146/77   05/21/24 138/71   04/23/24 (!) 152/84     Pt presents with diarrhea every 2 weeks. Started 6-7 months ago.

## 2025-03-04 LAB
BASOPHILS # BLD AUTO: 0.1 X10E3/UL (ref 0–0.2)
BASOPHILS NFR BLD AUTO: 1 %
EOSINOPHIL # BLD AUTO: 0.1 X10E3/UL (ref 0–0.4)
EOSINOPHIL NFR BLD AUTO: 1 %
ERYTHROCYTE [DISTWIDTH] IN BLOOD BY AUTOMATED COUNT: 15.7 % (ref 11.7–15.4)
HBA1C MFR BLD: 5.6 % (ref 4.8–5.6)
HCT VFR BLD AUTO: 51 % (ref 34–46.6)
HGB BLD-MCNC: 16.2 G/DL (ref 11.1–15.9)
IMM GRANULOCYTES # BLD AUTO: 0 X10E3/UL (ref 0–0.1)
IMM GRANULOCYTES NFR BLD AUTO: 0 %
LYMPHOCYTES # BLD AUTO: 1.5 X10E3/UL (ref 0.7–3.1)
LYMPHOCYTES NFR BLD AUTO: 26 %
MCH RBC QN AUTO: 26.5 PG (ref 26.6–33)
MCHC RBC AUTO-ENTMCNC: 31.8 G/DL (ref 31.5–35.7)
MCV RBC AUTO: 83 FL (ref 79–97)
MONOCYTES # BLD AUTO: 0.5 X10E3/UL (ref 0.1–0.9)
MONOCYTES NFR BLD AUTO: 9 %
NEUTROPHILS # BLD AUTO: 3.6 X10E3/UL (ref 1.4–7)
NEUTROPHILS NFR BLD AUTO: 63 %
PLATELET # BLD AUTO: 328 X10E3/UL (ref 150–450)
RBC # BLD AUTO: 6.12 X10E6/UL (ref 3.77–5.28)
WBC # BLD AUTO: 5.8 X10E3/UL (ref 3.4–10.8)

## 2025-03-05 LAB
FERRITIN SERPL-MCNC: 61 NG/ML (ref 15–150)
IRON SATN MFR SERPL: 35 % (ref 15–55)
IRON SERPL-MCNC: 154 UG/DL (ref 27–139)
MAGNESIUM SERPL-MCNC: 2.6 MG/DL (ref 1.6–2.3)
TIBC SERPL-MCNC: 437 UG/DL (ref 250–450)
TSH SERPL DL<=0.005 MIU/L-ACNC: 1.13 UIU/ML (ref 0.45–4.5)
UIBC SERPL-MCNC: 283 UG/DL (ref 118–369)
VIT B12 SERPL-MCNC: 451 PG/ML (ref 232–1245)

## 2025-03-13 ENCOUNTER — RESULTS FOLLOW-UP (OUTPATIENT)
Facility: CLINIC | Age: 70
End: 2025-03-13

## 2025-04-30 DIAGNOSIS — I1A.0 RESISTANT HYPERTENSION: ICD-10-CM

## 2025-05-05 RX ORDER — AMLODIPINE BESYLATE 10 MG/1
10 TABLET ORAL DAILY
Qty: 90 TABLET | Refills: 3 | OUTPATIENT
Start: 2025-05-05

## 2025-06-05 ENCOUNTER — TRANSCRIBE ORDERS (OUTPATIENT)
Facility: HOSPITAL | Age: 70
End: 2025-06-05

## 2025-06-05 DIAGNOSIS — Z78.0 MENOPAUSE: Primary | ICD-10-CM

## 2025-06-05 DIAGNOSIS — Z12.31 VISIT FOR SCREENING MAMMOGRAM: Primary | ICD-10-CM

## 2025-07-22 ENCOUNTER — TRANSCRIBE ORDERS (OUTPATIENT)
Facility: HOSPITAL | Age: 70
End: 2025-07-22

## 2025-07-22 DIAGNOSIS — F17.200 TOBACCO DEPENDENCE: Primary | ICD-10-CM

## 2025-07-28 ENCOUNTER — HOSPITAL ENCOUNTER (OUTPATIENT)
Facility: HOSPITAL | Age: 70
Discharge: HOME | End: 2025-07-31
Payer: MEDICARE

## 2025-07-28 DIAGNOSIS — Z12.31 VISIT FOR SCREENING MAMMOGRAM: ICD-10-CM

## 2025-07-28 DIAGNOSIS — Z78.0 MENOPAUSE: ICD-10-CM

## 2025-07-28 PROCEDURE — 77063 BREAST TOMOSYNTHESIS BI: CPT

## 2025-07-28 PROCEDURE — 77080 DXA BONE DENSITY AXIAL: CPT

## 2025-08-01 ENCOUNTER — HOSPITAL ENCOUNTER (OUTPATIENT)
Facility: HOSPITAL | Age: 70
Discharge: HOME | End: 2025-08-01
Payer: MEDICARE

## 2025-08-01 DIAGNOSIS — F17.200 TOBACCO DEPENDENCE: ICD-10-CM

## 2025-08-01 PROCEDURE — 71271 CT THORAX LUNG CANCER SCR C-: CPT

## (undated) DEVICE — BAG SPEC BIOHZRD 10 X 10 IN --

## (undated) DEVICE — Device: Brand: MEDEX

## (undated) DEVICE — Device

## (undated) DEVICE — BASIN EMESIS 500CC ROSE 250/CS 60/PLT: Brand: MEDEGEN MEDICAL PRODUCTS, LLC

## (undated) DEVICE — KENDALL RADIOLUCENT FOAM MONITORING ELECTRODE RECTANGULAR SHAPE: Brand: KENDALL

## (undated) DEVICE — TOWEL 4 PLY TISS 19X30 SUE WHT

## (undated) DEVICE — FORCEPS BX L160CM DIA8MM GRSP DISECT CUP TIP NONLOCKING ROT

## (undated) DEVICE — MEDI-VAC YANK SUCT HNDL W/TPRD BULBOUS TIP: Brand: CARDINAL HEALTH

## (undated) DEVICE — NEONATAL-ADULT SPO2 SENSOR: Brand: NELLCOR

## (undated) DEVICE — NEEDLE HYPO 18GA L1.5IN PNK S STL HUB POLYPR SHLD REG BVL

## (undated) DEVICE — SOLIDIFIER MEDC 1200ML -- CONVERT TO 356117

## (undated) DEVICE — CATH IV AUTOGRD BC BLU 22GA 25 -- INSYTE

## (undated) DEVICE — SET ADMIN 16ML TBNG L100IN 2 Y INJ SITE IV PIGGY BK DISP

## (undated) DEVICE — Z DISCONTINUED PER MEDLINE LINE GAS SAMPLING O2/CO2 LNG AD 13 FT NSL W/ TBNG FILTERLINE

## (undated) DEVICE — BLOCK BITE ENDOSCP AD 21 MM W/ DIL BLU LF DISP

## (undated) DEVICE — CONTAINER SPEC 20 ML LID NEUT BUFF FORMALIN 10 % POLYPR STS

## (undated) DEVICE — (D)SYR 10ML 1/5ML GRAD NSAF -- PKGING CHANGE USE ITEM 338027

## (undated) DEVICE — BRUSH CYTO BRONCHSCP 1.5/140MM -- CELLEBRITY

## (undated) DEVICE — 1200 GUARD II KIT W/5MM TUBE W/O VAC TUBE: Brand: GUARDIAN

## (undated) DEVICE — SYR 3ML LL TIP 1/10ML GRAD --